# Patient Record
Sex: MALE | Race: WHITE | Employment: OTHER | URBAN - METROPOLITAN AREA
[De-identification: names, ages, dates, MRNs, and addresses within clinical notes are randomized per-mention and may not be internally consistent; named-entity substitution may affect disease eponyms.]

---

## 2023-08-15 ENCOUNTER — HOSPITAL ENCOUNTER (OUTPATIENT)
Dept: WOUND CARE | Age: 63
Discharge: HOME | End: 2023-08-15
Payer: OTHER GOVERNMENT

## 2023-08-15 DIAGNOSIS — E11.621 DIABETIC FOOT ULCER WITH OSTEOMYELITIS (HCC): ICD-10-CM

## 2023-08-15 DIAGNOSIS — M86.9 DIABETIC FOOT ULCER WITH OSTEOMYELITIS (HCC): ICD-10-CM

## 2023-08-15 DIAGNOSIS — E11.69 DIABETIC FOOT ULCER WITH OSTEOMYELITIS (HCC): ICD-10-CM

## 2023-08-15 DIAGNOSIS — L97.509 DIABETIC FOOT ULCER WITH OSTEOMYELITIS (HCC): ICD-10-CM

## 2023-08-15 PROCEDURE — 99203 OFFICE O/P NEW LOW 30 MIN: CPT | Performed by: NURSE PRACTITIONER

## 2023-08-15 NOTE — PROGRESS NOTES
magna and Penicillium (mold). Currently on IV Ceftaroline 600 mg twice daily, PO Metrodinazole, IV micafungin.     Diagnostic Impression:      Diabetic foot ulcer Morejon's stage 3  Non-healing wound of the  right foot   Osteomyelitis of the right foot  Peripheral vascular disease  Diabetes      ROBIN Sharma - CNP MD    8/15/2023

## 2023-08-24 ENCOUNTER — HOSPITAL ENCOUNTER (OUTPATIENT)
Dept: HYPERBARIC MEDICINE | Age: 63
Discharge: HOME | End: 2023-08-24

## 2023-08-29 ENCOUNTER — HOSPITAL ENCOUNTER (OUTPATIENT)
Dept: HYPERBARIC MEDICINE | Age: 63
Discharge: HOME | End: 2023-08-29

## 2023-08-30 ENCOUNTER — HOSPITAL ENCOUNTER (OUTPATIENT)
Dept: HYPERBARIC MEDICINE | Age: 63
Discharge: HOME | End: 2023-08-30
Payer: OTHER GOVERNMENT

## 2023-08-30 VITALS
RESPIRATION RATE: 16 BRPM | HEART RATE: 80 BPM | SYSTOLIC BLOOD PRESSURE: 144 MMHG | TEMPERATURE: 97.3 F | DIASTOLIC BLOOD PRESSURE: 77 MMHG

## 2023-08-30 DIAGNOSIS — M86.9 DIABETIC FOOT ULCER WITH OSTEOMYELITIS (HCC): Primary | ICD-10-CM

## 2023-08-30 DIAGNOSIS — E11.69 DIABETIC FOOT ULCER WITH OSTEOMYELITIS (HCC): Primary | ICD-10-CM

## 2023-08-30 DIAGNOSIS — L97.509 DIABETIC FOOT ULCER WITH OSTEOMYELITIS (HCC): Primary | ICD-10-CM

## 2023-08-30 DIAGNOSIS — E11.621 DIABETIC FOOT ULCER WITH OSTEOMYELITIS (HCC): Primary | ICD-10-CM

## 2023-08-30 LAB
GLUCOSE BLD-MCNC: 242 MG/DL (ref 70–99)
GLUCOSE BLD-MCNC: 262 MG/DL (ref 70–99)

## 2023-08-30 PROCEDURE — 82962 GLUCOSE BLOOD TEST: CPT

## 2023-08-30 PROCEDURE — G0277 HBOT, FULL BODY CHAMBER, 30M: HCPCS

## 2023-08-30 RX ORDER — ACETAMINOPHEN 500 MG
500 TABLET ORAL EVERY 6 HOURS PRN
COMMUNITY

## 2023-08-30 RX ORDER — ENOXAPARIN SODIUM 300 MG/3ML
INJECTION INTRAVENOUS; SUBCUTANEOUS 2 TIMES DAILY
COMMUNITY
End: 2023-08-30

## 2023-08-30 RX ORDER — ASPIRIN 81 MG/1
81 TABLET, CHEWABLE ORAL DAILY
COMMUNITY

## 2023-08-30 RX ORDER — LEVOTHYROXINE SODIUM 88 UG/1
88 TABLET ORAL DAILY
COMMUNITY

## 2023-08-30 RX ORDER — ENOXAPARIN SODIUM 100 MG/ML
INJECTION SUBCUTANEOUS DAILY
COMMUNITY

## 2023-08-30 RX ORDER — LOSARTAN POTASSIUM 25 MG/1
25 TABLET ORAL DAILY
COMMUNITY

## 2023-08-30 RX ORDER — ATORVASTATIN CALCIUM 80 MG/1
80 TABLET, FILM COATED ORAL DAILY
COMMUNITY

## 2023-08-30 RX ORDER — METRONIDAZOLE 500 MG/1
500 TABLET ORAL 2 TIMES DAILY
COMMUNITY

## 2023-08-30 RX ORDER — CLOPIDOGREL BISULFATE 75 MG/1
75 TABLET ORAL DAILY
COMMUNITY

## 2023-08-31 ENCOUNTER — HOSPITAL ENCOUNTER (OUTPATIENT)
Dept: HYPERBARIC MEDICINE | Age: 63
Discharge: HOME | End: 2023-08-31
Payer: OTHER GOVERNMENT

## 2023-08-31 VITALS
HEART RATE: 69 BPM | DIASTOLIC BLOOD PRESSURE: 80 MMHG | SYSTOLIC BLOOD PRESSURE: 176 MMHG | RESPIRATION RATE: 16 BRPM | TEMPERATURE: 97 F

## 2023-08-31 DIAGNOSIS — M86.9 DIABETIC FOOT ULCER WITH OSTEOMYELITIS (HCC): Primary | ICD-10-CM

## 2023-08-31 DIAGNOSIS — L97.509 DIABETIC FOOT ULCER WITH OSTEOMYELITIS (HCC): Primary | ICD-10-CM

## 2023-08-31 DIAGNOSIS — E11.621 DIABETIC FOOT ULCER WITH OSTEOMYELITIS (HCC): Primary | ICD-10-CM

## 2023-08-31 DIAGNOSIS — E11.69 DIABETIC FOOT ULCER WITH OSTEOMYELITIS (HCC): Primary | ICD-10-CM

## 2023-08-31 LAB
GLUCOSE BLD-MCNC: 183 MG/DL (ref 70–99)
GLUCOSE BLD-MCNC: 190 MG/DL (ref 70–99)

## 2023-08-31 PROCEDURE — 82962 GLUCOSE BLOOD TEST: CPT

## 2023-08-31 PROCEDURE — G0277 HBOT, FULL BODY CHAMBER, 30M: HCPCS

## 2023-08-31 RX ORDER — ZOLPIDEM TARTRATE 5 MG/1
5 TABLET ORAL NIGHTLY PRN
COMMUNITY

## 2023-08-31 RX ORDER — CHOLECALCIFEROL (VITAMIN D3) 125 MCG
500 CAPSULE ORAL DAILY
COMMUNITY

## 2023-09-01 ENCOUNTER — HOSPITAL ENCOUNTER (OUTPATIENT)
Dept: HYPERBARIC MEDICINE | Age: 63
Discharge: HOME | End: 2023-09-01
Payer: OTHER GOVERNMENT

## 2023-09-01 VITALS
TEMPERATURE: 97.3 F | RESPIRATION RATE: 16 BRPM | HEART RATE: 74 BPM | SYSTOLIC BLOOD PRESSURE: 175 MMHG | DIASTOLIC BLOOD PRESSURE: 94 MMHG

## 2023-09-01 DIAGNOSIS — E11.69 DIABETIC FOOT ULCER WITH OSTEOMYELITIS (HCC): Primary | ICD-10-CM

## 2023-09-01 DIAGNOSIS — M86.9 DIABETIC FOOT ULCER WITH OSTEOMYELITIS (HCC): Primary | ICD-10-CM

## 2023-09-01 DIAGNOSIS — L97.509 DIABETIC FOOT ULCER WITH OSTEOMYELITIS (HCC): Primary | ICD-10-CM

## 2023-09-01 DIAGNOSIS — E11.621 DIABETIC FOOT ULCER WITH OSTEOMYELITIS (HCC): Primary | ICD-10-CM

## 2023-09-01 LAB
GLUCOSE BLD-MCNC: 147 MG/DL (ref 70–99)
GLUCOSE BLD-MCNC: 163 MG/DL (ref 70–99)

## 2023-09-01 PROCEDURE — G0277 HBOT, FULL BODY CHAMBER, 30M: HCPCS

## 2023-09-01 PROCEDURE — 82962 GLUCOSE BLOOD TEST: CPT

## 2023-09-05 ENCOUNTER — HOSPITAL ENCOUNTER (OUTPATIENT)
Dept: HYPERBARIC MEDICINE | Age: 63
Discharge: HOME | End: 2023-09-05
Payer: OTHER GOVERNMENT

## 2023-09-05 VITALS
SYSTOLIC BLOOD PRESSURE: 157 MMHG | TEMPERATURE: 97.1 F | DIASTOLIC BLOOD PRESSURE: 72 MMHG | RESPIRATION RATE: 16 BRPM | HEART RATE: 73 BPM

## 2023-09-05 DIAGNOSIS — M86.9 DIABETIC FOOT ULCER WITH OSTEOMYELITIS (HCC): Primary | ICD-10-CM

## 2023-09-05 DIAGNOSIS — L97.509 DIABETIC FOOT ULCER WITH OSTEOMYELITIS (HCC): Primary | ICD-10-CM

## 2023-09-05 DIAGNOSIS — E11.69 DIABETIC FOOT ULCER WITH OSTEOMYELITIS (HCC): Primary | ICD-10-CM

## 2023-09-05 DIAGNOSIS — E11.621 DIABETIC FOOT ULCER WITH OSTEOMYELITIS (HCC): Primary | ICD-10-CM

## 2023-09-05 LAB
GLUCOSE BLD-MCNC: 141 MG/DL (ref 70–99)
GLUCOSE BLD-MCNC: 177 MG/DL (ref 70–99)

## 2023-09-05 PROCEDURE — G0277 HBOT, FULL BODY CHAMBER, 30M: HCPCS

## 2023-09-05 PROCEDURE — 82962 GLUCOSE BLOOD TEST: CPT

## 2023-09-05 PROCEDURE — 99183 HYPERBARIC OXYGEN THERAPY: CPT | Performed by: NURSE PRACTITIONER

## 2023-09-06 ENCOUNTER — HOSPITAL ENCOUNTER (OUTPATIENT)
Dept: HYPERBARIC MEDICINE | Age: 63
Discharge: HOME | End: 2023-09-06
Payer: OTHER GOVERNMENT

## 2023-09-06 VITALS
SYSTOLIC BLOOD PRESSURE: 152 MMHG | RESPIRATION RATE: 16 BRPM | TEMPERATURE: 96.9 F | DIASTOLIC BLOOD PRESSURE: 74 MMHG | HEART RATE: 78 BPM

## 2023-09-06 DIAGNOSIS — E11.621 DIABETIC FOOT ULCER WITH OSTEOMYELITIS (HCC): Primary | ICD-10-CM

## 2023-09-06 DIAGNOSIS — L97.509 DIABETIC FOOT ULCER WITH OSTEOMYELITIS (HCC): Primary | ICD-10-CM

## 2023-09-06 DIAGNOSIS — E11.69 DIABETIC FOOT ULCER WITH OSTEOMYELITIS (HCC): Primary | ICD-10-CM

## 2023-09-06 DIAGNOSIS — M86.9 DIABETIC FOOT ULCER WITH OSTEOMYELITIS (HCC): Primary | ICD-10-CM

## 2023-09-06 LAB
GLUCOSE BLD-MCNC: 138 MG/DL (ref 70–99)
GLUCOSE BLD-MCNC: 95 MG/DL (ref 70–99)

## 2023-09-06 PROCEDURE — G0277 HBOT, FULL BODY CHAMBER, 30M: HCPCS

## 2023-09-06 PROCEDURE — 82962 GLUCOSE BLOOD TEST: CPT

## 2023-09-06 RX ORDER — INSULIN GLARGINE 100 [IU]/ML
30 INJECTION, SOLUTION SUBCUTANEOUS NIGHTLY
COMMUNITY

## 2023-09-06 NOTE — PROGRESS NOTES
1 SN voiced preparing to do repeat BS ,after intervention , prior to starting HBO treatment  , Patient stated \" I'm fine , I don't need it repeated , I'm not shaking anymore and I'll be fine \". Repeat BS not done at this time per Pt.  Request.

## 2023-09-07 ENCOUNTER — HOSPITAL ENCOUNTER (OUTPATIENT)
Dept: WOUND CARE | Age: 63
Discharge: HOME | End: 2023-09-07
Payer: OTHER GOVERNMENT

## 2023-09-07 ENCOUNTER — HOSPITAL ENCOUNTER (OUTPATIENT)
Dept: HYPERBARIC MEDICINE | Age: 63
Discharge: HOME | End: 2023-09-07
Payer: OTHER GOVERNMENT

## 2023-09-07 VITALS
DIASTOLIC BLOOD PRESSURE: 56 MMHG | TEMPERATURE: 96.6 F | HEIGHT: 72 IN | SYSTOLIC BLOOD PRESSURE: 124 MMHG | HEART RATE: 85 BPM | WEIGHT: 215 LBS | RESPIRATION RATE: 16 BRPM | BODY MASS INDEX: 29.12 KG/M2

## 2023-09-07 VITALS
SYSTOLIC BLOOD PRESSURE: 156 MMHG | DIASTOLIC BLOOD PRESSURE: 76 MMHG | TEMPERATURE: 97.1 F | RESPIRATION RATE: 16 BRPM | HEART RATE: 86 BPM

## 2023-09-07 DIAGNOSIS — E11.69 DIABETIC FOOT ULCER WITH OSTEOMYELITIS (HCC): Primary | ICD-10-CM

## 2023-09-07 DIAGNOSIS — Z79.4 DIABETES MELLITUS TREATED WITH INSULIN (HCC): ICD-10-CM

## 2023-09-07 DIAGNOSIS — E11.621 DIABETIC FOOT ULCER WITH OSTEOMYELITIS (HCC): Primary | ICD-10-CM

## 2023-09-07 DIAGNOSIS — L97.509 DIABETIC FOOT ULCER WITH OSTEOMYELITIS (HCC): Primary | ICD-10-CM

## 2023-09-07 DIAGNOSIS — E11.9 DIABETES MELLITUS TREATED WITH INSULIN (HCC): ICD-10-CM

## 2023-09-07 DIAGNOSIS — I73.9 PAD (PERIPHERAL ARTERY DISEASE) (HCC): ICD-10-CM

## 2023-09-07 DIAGNOSIS — E11.621 DIABETIC ULCER OF RIGHT MIDFOOT ASSOCIATED WITH TYPE 2 DIABETES MELLITUS, WITH NECROSIS OF MUSCLE (HCC): ICD-10-CM

## 2023-09-07 DIAGNOSIS — M86.9 DIABETIC FOOT ULCER WITH OSTEOMYELITIS (HCC): Primary | ICD-10-CM

## 2023-09-07 DIAGNOSIS — L97.413 DIABETIC ULCER OF RIGHT MIDFOOT ASSOCIATED WITH TYPE 2 DIABETES MELLITUS, WITH NECROSIS OF MUSCLE (HCC): ICD-10-CM

## 2023-09-07 LAB
GLUCOSE BLD-MCNC: 128 MG/DL (ref 70–99)
GLUCOSE BLD-MCNC: 140 MG/DL (ref 70–99)

## 2023-09-07 PROCEDURE — 99213 OFFICE O/P EST LOW 20 MIN: CPT

## 2023-09-07 PROCEDURE — 11043 DBRDMT MUSC&/FSCA 1ST 20/<: CPT | Performed by: NURSE PRACTITIONER

## 2023-09-07 PROCEDURE — G0277 HBOT, FULL BODY CHAMBER, 30M: HCPCS

## 2023-09-07 PROCEDURE — 11046 DBRDMT MUSC&/FSCA EA ADDL: CPT

## 2023-09-07 PROCEDURE — 82962 GLUCOSE BLOOD TEST: CPT

## 2023-09-07 PROCEDURE — 11043 DBRDMT MUSC&/FSCA 1ST 20/<: CPT

## 2023-09-07 PROCEDURE — 11046 DBRDMT MUSC&/FSCA EA ADDL: CPT | Performed by: NURSE PRACTITIONER

## 2023-09-07 RX ORDER — GENTAMICIN SULFATE 1 MG/G
OINTMENT TOPICAL ONCE
OUTPATIENT
Start: 2023-09-07 | End: 2023-09-07

## 2023-09-07 RX ORDER — SODIUM CHLOR/HYPOCHLOROUS ACID 0.033 %
SOLUTION, IRRIGATION IRRIGATION ONCE
OUTPATIENT
Start: 2023-09-07 | End: 2023-09-07

## 2023-09-07 RX ORDER — BACITRACIN ZINC AND POLYMYXIN B SULFATE 500; 1000 [USP'U]/G; [USP'U]/G
OINTMENT TOPICAL ONCE
OUTPATIENT
Start: 2023-09-07 | End: 2023-09-07

## 2023-09-07 RX ORDER — LIDOCAINE HYDROCHLORIDE 40 MG/ML
SOLUTION TOPICAL ONCE
OUTPATIENT
Start: 2023-09-07 | End: 2023-09-07

## 2023-09-07 RX ORDER — LIDOCAINE 40 MG/G
CREAM TOPICAL ONCE
OUTPATIENT
Start: 2023-09-07 | End: 2023-09-07

## 2023-09-07 RX ORDER — GINSENG 100 MG
CAPSULE ORAL ONCE
OUTPATIENT
Start: 2023-09-07 | End: 2023-09-07

## 2023-09-07 RX ORDER — LIDOCAINE 50 MG/G
OINTMENT TOPICAL ONCE
OUTPATIENT
Start: 2023-09-07 | End: 2023-09-07

## 2023-09-07 RX ORDER — BETAMETHASONE DIPROPIONATE 0.05 %
OINTMENT (GRAM) TOPICAL ONCE
OUTPATIENT
Start: 2023-09-07 | End: 2023-09-07

## 2023-09-07 RX ORDER — CLOBETASOL PROPIONATE 0.5 MG/G
OINTMENT TOPICAL ONCE
OUTPATIENT
Start: 2023-09-07 | End: 2023-09-07

## 2023-09-07 RX ORDER — IBUPROFEN 200 MG
TABLET ORAL ONCE
OUTPATIENT
Start: 2023-09-07 | End: 2023-09-07

## 2023-09-07 NOTE — PROGRESS NOTES
[facility] Initial Visit      Madelaine Eric  AGE: 61 y.o. GENDER: male  : 1960  EPISODE DATE:  2023   Referred by: Elysia Tiwari     Subjective:     CHIEF COMPLAINT  WOUND   Problem List Items Addressed This Visit          Circulatory    WD-PAD (peripheral artery disease) ([address])       Endocrine    HBO-Diabetic foot ulcer with osteomyelitis ([address]) - Primary    WD-Diabetic ulcer of right midfoot associated with type 2 diabetes mellitus, with necrosis of muscle ([address])    WD-Diabetes mellitus treated with insulin [facility])     Chief Complaint   Patient presents with    Wound Check        HISTORY of PRESENT ILLNESS      Madelaine Eric is a 61 y.o. male who presents to the [address] [place] [address] [address] for an initial visit for evaluation and treatment of Chronic diabetic ulcer(s) of  right foot. The condition is of marked severity. The ulcer has been present for several months at initial visit to the wound clinic. The underlying cause is thought to be diabetes complicated PAD and infection with osteomyelitis. The patients care to date has included care at the [place], currently inpatient receiving IV antibiotics. The patient has significant underlying medical conditions as below. Elysia Tiwari is PCP. The patient is a left BKA. Wound Pain Timing/Severity: intermittent, mild  Quality of pain: dull, aching, tender  Severity of pain:  3 / 10   Modifying Factors: diabetes, poor glucose control, chronic pressure, shear force, arterial insufficiency, and anticoagulation therapy  Associated Signs/Symptoms: edema, erythema, drainage, and pain    LACY: as indicated base on wound location and assessment      Wound infection: wound culture will be obtained as needed for symptoms of infection         Arterial evaluation: if indicated based on wound, location, symptoms and healing    Venous Evaluation: if indicated based on wound, location, symptoms and healing    Radiology:         No LMP for male patient. Diabetes:  On an insulin

## 2023-09-07 NOTE — PROGRESS NOTES
Nonselective enzymatic debridement performed with Santyl per physician order to wound(s) of the right plantar foot. Patient tolerated the procedure well.      Electronically signed by Sanjay Falcon RN on 9/7/2023 at 9:15 AM

## 2023-09-07 NOTE — DISCHARGE INSTRUCTIONS
PHYSICIAN ORDERS AND DISCHARGE INSTRUCTIONS    NOTE: Upon discharge from the  Medical [facility], you will receive a patient experience survey. We would be grateful if you would take the time to fill this survey out. Wound care order history:     LACY's   Right       Left    Date:   Cultures:     Grafts:     Antibiotics:        Continuing wound care orders and information:                Residence:                Continue home health care with:    Your wound-care supplies will be provided by: Wound cleansing:     Do not scrub or use excessive force. Wash hands with soap and water before and after dressing changes. Prior to applying a clean dressing, cleanse wound with normal saline, wound cleanser, or mild soap and water. Ask the physician or nurse before getting the wound(s) wet in a shower. General Wound management:   Keep weight off wounds and reposition every 2 hours. Avoid standing for long periods of time. Apply wraps/stockings in AM and remove at bedtime. If swelling is present, elevate legs to the level of the heart or above for 30 minutes 4-5 times a day and/or when sitting. When taking antibiotics take entire prescription as ordered by physician do not stop taking until medicine is all gone. Increase protein intake to promote healing. Orders for this week: 9/7/23    [facility] COMPLET[facility]: Fax # [phone]    Rx:    Right Plantar Foot Wound - Wash with soap and water, pat dry. Apply Santyl and Stimulen to wound bed. Cover with Sorbact and two ABDs bolstered laterally with tape across bottom of foot (from medial to lateral). Wrap with conform and ACE up to ankle. Change daily. Dispense 30 day quantity when ordering supplies. Follow up with Kristen Nixon CNP in 1 week(s) in the wound care center. Call [phone] for any questions or concerns.   Date__________   Time____________

## 2023-09-08 ENCOUNTER — HOSPITAL ENCOUNTER (OUTPATIENT)
Dept: WOUND CARE | Age: 63
Discharge: HOME | End: 2023-09-08
Payer: OTHER GOVERNMENT

## 2023-09-08 ENCOUNTER — HOSPITAL ENCOUNTER (OUTPATIENT)
Dept: HYPERBARIC MEDICINE | Age: 63
Discharge: HOME | End: 2023-09-08
Payer: OTHER GOVERNMENT

## 2023-09-08 VITALS
RESPIRATION RATE: 16 BRPM | TEMPERATURE: 97.2 F | SYSTOLIC BLOOD PRESSURE: 138 MMHG | DIASTOLIC BLOOD PRESSURE: 77 MMHG | HEART RATE: 77 BPM

## 2023-09-08 DIAGNOSIS — M86.9 DIABETIC FOOT ULCER WITH OSTEOMYELITIS (HCC): Primary | ICD-10-CM

## 2023-09-08 DIAGNOSIS — E11.69 DIABETIC FOOT ULCER WITH OSTEOMYELITIS (HCC): Primary | ICD-10-CM

## 2023-09-08 DIAGNOSIS — L97.509 DIABETIC FOOT ULCER WITH OSTEOMYELITIS (HCC): Primary | ICD-10-CM

## 2023-09-08 DIAGNOSIS — E11.621 DIABETIC FOOT ULCER WITH OSTEOMYELITIS (HCC): Primary | ICD-10-CM

## 2023-09-08 LAB
GLUCOSE BLD-MCNC: 119 MG/DL (ref 70–99)
GLUCOSE BLD-MCNC: 193 MG/DL (ref 70–99)

## 2023-09-08 PROCEDURE — G0277 HBOT, FULL BODY CHAMBER, 30M: HCPCS

## 2023-09-08 PROCEDURE — 97602 WOUND(S) CARE NON-SELECTIVE: CPT

## 2023-09-08 PROCEDURE — 82962 GLUCOSE BLOOD TEST: CPT

## 2023-09-08 NOTE — PROGRESS NOTES
Nonselective enzymatic debridement performed with Santyl per physician order to wound(s) of the right foot  Patient tolerated the procedure well.

## 2023-09-11 ENCOUNTER — HOSPITAL ENCOUNTER (OUTPATIENT)
Dept: WOUND CARE | Age: 63
Discharge: HOME | End: 2023-09-11
Payer: OTHER GOVERNMENT

## 2023-09-11 ENCOUNTER — HOSPITAL ENCOUNTER (OUTPATIENT)
Dept: HYPERBARIC MEDICINE | Age: 63
Discharge: HOME | End: 2023-09-11
Payer: OTHER GOVERNMENT

## 2023-09-11 VITALS
TEMPERATURE: 97.1 F | SYSTOLIC BLOOD PRESSURE: 162 MMHG | RESPIRATION RATE: 16 BRPM | DIASTOLIC BLOOD PRESSURE: 79 MMHG | HEART RATE: 77 BPM

## 2023-09-11 DIAGNOSIS — L97.509 DIABETIC FOOT ULCER WITH OSTEOMYELITIS (HCC): Primary | ICD-10-CM

## 2023-09-11 DIAGNOSIS — M86.9 DIABETIC FOOT ULCER WITH OSTEOMYELITIS (HCC): Primary | ICD-10-CM

## 2023-09-11 DIAGNOSIS — E11.69 DIABETIC FOOT ULCER WITH OSTEOMYELITIS (HCC): Primary | ICD-10-CM

## 2023-09-11 DIAGNOSIS — E11.621 DIABETIC FOOT ULCER WITH OSTEOMYELITIS (HCC): Primary | ICD-10-CM

## 2023-09-11 LAB
GLUCOSE BLD-MCNC: 123 MG/DL (ref 70–99)
GLUCOSE BLD-MCNC: 171 MG/DL (ref 70–99)

## 2023-09-11 PROCEDURE — 82962 GLUCOSE BLOOD TEST: CPT

## 2023-09-11 PROCEDURE — G0277 HBOT, FULL BODY CHAMBER, 30M: HCPCS

## 2023-09-11 PROCEDURE — 99213 OFFICE O/P EST LOW 20 MIN: CPT

## 2023-09-12 ENCOUNTER — HOSPITAL ENCOUNTER (OUTPATIENT)
Dept: WOUND CARE | Age: 63
Discharge: HOME | End: 2023-09-12
Payer: OTHER GOVERNMENT

## 2023-09-12 ENCOUNTER — HOSPITAL ENCOUNTER (OUTPATIENT)
Dept: HYPERBARIC MEDICINE | Age: 63
Discharge: HOME | End: 2023-09-12
Payer: OTHER GOVERNMENT

## 2023-09-12 VITALS
SYSTOLIC BLOOD PRESSURE: 138 MMHG | RESPIRATION RATE: 16 BRPM | TEMPERATURE: 97.3 F | DIASTOLIC BLOOD PRESSURE: 75 MMHG | HEART RATE: 71 BPM

## 2023-09-12 VITALS
SYSTOLIC BLOOD PRESSURE: 117 MMHG | RESPIRATION RATE: 16 BRPM | DIASTOLIC BLOOD PRESSURE: 63 MMHG | HEART RATE: 77 BPM | TEMPERATURE: 97.6 F

## 2023-09-12 DIAGNOSIS — L97.509 DIABETIC FOOT ULCER WITH OSTEOMYELITIS (HCC): Primary | ICD-10-CM

## 2023-09-12 DIAGNOSIS — M86.9 DIABETIC FOOT ULCER WITH OSTEOMYELITIS (HCC): Primary | ICD-10-CM

## 2023-09-12 DIAGNOSIS — E11.621 DIABETIC FOOT ULCER WITH OSTEOMYELITIS (HCC): Primary | ICD-10-CM

## 2023-09-12 DIAGNOSIS — E11.69 DIABETIC FOOT ULCER WITH OSTEOMYELITIS (HCC): Primary | ICD-10-CM

## 2023-09-12 LAB
GLUCOSE BLD-MCNC: 147 MG/DL (ref 70–99)
GLUCOSE BLD-MCNC: 176 MG/DL (ref 70–99)

## 2023-09-12 PROCEDURE — 82962 GLUCOSE BLOOD TEST: CPT

## 2023-09-12 PROCEDURE — G0277 HBOT, FULL BODY CHAMBER, 30M: HCPCS

## 2023-09-12 PROCEDURE — 97602 WOUND(S) CARE NON-SELECTIVE: CPT

## 2023-09-13 ENCOUNTER — HOSPITAL ENCOUNTER (OUTPATIENT)
Dept: WOUND CARE | Age: 63
Discharge: HOME | End: 2023-09-13
Payer: OTHER GOVERNMENT

## 2023-09-13 ENCOUNTER — HOSPITAL ENCOUNTER (OUTPATIENT)
Dept: HYPERBARIC MEDICINE | Age: 63
Discharge: HOME | End: 2023-09-13
Payer: OTHER GOVERNMENT

## 2023-09-13 VITALS
DIASTOLIC BLOOD PRESSURE: 85 MMHG | TEMPERATURE: 97.1 F | HEART RATE: 82 BPM | RESPIRATION RATE: 16 BRPM | SYSTOLIC BLOOD PRESSURE: 178 MMHG

## 2023-09-13 DIAGNOSIS — M86.9 DIABETIC FOOT ULCER WITH OSTEOMYELITIS (HCC): Primary | ICD-10-CM

## 2023-09-13 DIAGNOSIS — L97.413 DIABETIC ULCER OF RIGHT MIDFOOT ASSOCIATED WITH TYPE 2 DIABETES MELLITUS, WITH NECROSIS OF MUSCLE (HCC): ICD-10-CM

## 2023-09-13 DIAGNOSIS — I73.9 PAD (PERIPHERAL ARTERY DISEASE) (HCC): Primary | ICD-10-CM

## 2023-09-13 DIAGNOSIS — L97.509 DIABETIC FOOT ULCER WITH OSTEOMYELITIS (HCC): Primary | ICD-10-CM

## 2023-09-13 DIAGNOSIS — Z79.4 DIABETES MELLITUS TREATED WITH INSULIN (HCC): ICD-10-CM

## 2023-09-13 DIAGNOSIS — E11.621 DIABETIC ULCER OF RIGHT MIDFOOT ASSOCIATED WITH TYPE 2 DIABETES MELLITUS, WITH NECROSIS OF MUSCLE (HCC): ICD-10-CM

## 2023-09-13 DIAGNOSIS — E11.621 DIABETIC FOOT ULCER WITH OSTEOMYELITIS (HCC): ICD-10-CM

## 2023-09-13 DIAGNOSIS — M86.9 DIABETIC FOOT ULCER WITH OSTEOMYELITIS (HCC): ICD-10-CM

## 2023-09-13 DIAGNOSIS — E11.621 DIABETIC FOOT ULCER WITH OSTEOMYELITIS (HCC): Primary | ICD-10-CM

## 2023-09-13 DIAGNOSIS — L97.509 DIABETIC FOOT ULCER WITH OSTEOMYELITIS (HCC): ICD-10-CM

## 2023-09-13 DIAGNOSIS — E11.69 DIABETIC FOOT ULCER WITH OSTEOMYELITIS (HCC): Primary | ICD-10-CM

## 2023-09-13 DIAGNOSIS — E11.9 DIABETES MELLITUS TREATED WITH INSULIN (HCC): ICD-10-CM

## 2023-09-13 DIAGNOSIS — E11.69 DIABETIC FOOT ULCER WITH OSTEOMYELITIS (HCC): ICD-10-CM

## 2023-09-13 LAB
GLUCOSE BLD-MCNC: 154 MG/DL (ref 70–99)
GLUCOSE BLD-MCNC: 233 MG/DL (ref 70–99)

## 2023-09-13 PROCEDURE — 99183 HYPERBARIC OXYGEN THERAPY: CPT | Performed by: NURSE PRACTITIONER

## 2023-09-13 PROCEDURE — 97602 WOUND(S) CARE NON-SELECTIVE: CPT

## 2023-09-13 PROCEDURE — 82962 GLUCOSE BLOOD TEST: CPT

## 2023-09-13 PROCEDURE — G0277 HBOT, FULL BODY CHAMBER, 30M: HCPCS

## 2023-09-13 NOTE — PROGRESS NOTES
Nonselective enzymatic debridement performed with Santyl per physician order to wound(s) of the Right Plantar Foot. Patient tolerated the procedure well.      Electronically signed by Marla Smith RN on 9/13/2023 at 11:25 AM

## 2023-09-14 ENCOUNTER — HOSPITAL ENCOUNTER (OUTPATIENT)
Dept: HYPERBARIC MEDICINE | Age: 63
Discharge: HOME | End: 2023-09-14
Payer: OTHER GOVERNMENT

## 2023-09-14 ENCOUNTER — HOSPITAL ENCOUNTER (OUTPATIENT)
Dept: WOUND CARE | Age: 63
Discharge: HOME | End: 2023-09-14
Payer: OTHER GOVERNMENT

## 2023-09-14 VITALS
RESPIRATION RATE: 12 BRPM | HEART RATE: 81 BPM | DIASTOLIC BLOOD PRESSURE: 79 MMHG | TEMPERATURE: 97 F | SYSTOLIC BLOOD PRESSURE: 158 MMHG

## 2023-09-14 DIAGNOSIS — E11.621 DIABETIC ULCER OF RIGHT MIDFOOT ASSOCIATED WITH TYPE 2 DIABETES MELLITUS, WITH NECROSIS OF MUSCLE (HCC): Primary | ICD-10-CM

## 2023-09-14 DIAGNOSIS — L97.413 DIABETIC ULCER OF RIGHT MIDFOOT ASSOCIATED WITH TYPE 2 DIABETES MELLITUS, WITH NECROSIS OF MUSCLE (HCC): Primary | ICD-10-CM

## 2023-09-14 DIAGNOSIS — E11.621 DIABETIC FOOT ULCER WITH OSTEOMYELITIS (HCC): ICD-10-CM

## 2023-09-14 DIAGNOSIS — M86.9 DIABETIC FOOT ULCER WITH OSTEOMYELITIS (HCC): Primary | ICD-10-CM

## 2023-09-14 DIAGNOSIS — L97.509 DIABETIC FOOT ULCER WITH OSTEOMYELITIS (HCC): Primary | ICD-10-CM

## 2023-09-14 DIAGNOSIS — M86.9 DIABETIC FOOT ULCER WITH OSTEOMYELITIS (HCC): ICD-10-CM

## 2023-09-14 DIAGNOSIS — L97.509 DIABETIC FOOT ULCER WITH OSTEOMYELITIS (HCC): ICD-10-CM

## 2023-09-14 DIAGNOSIS — Z79.4 DIABETES MELLITUS TREATED WITH INSULIN (HCC): ICD-10-CM

## 2023-09-14 DIAGNOSIS — E11.9 DIABETES MELLITUS TREATED WITH INSULIN (HCC): ICD-10-CM

## 2023-09-14 DIAGNOSIS — I73.9 PAD (PERIPHERAL ARTERY DISEASE) (HCC): ICD-10-CM

## 2023-09-14 DIAGNOSIS — E11.69 DIABETIC FOOT ULCER WITH OSTEOMYELITIS (HCC): Primary | ICD-10-CM

## 2023-09-14 DIAGNOSIS — E11.69 DIABETIC FOOT ULCER WITH OSTEOMYELITIS (HCC): ICD-10-CM

## 2023-09-14 DIAGNOSIS — E11.621 DIABETIC FOOT ULCER WITH OSTEOMYELITIS (HCC): Primary | ICD-10-CM

## 2023-09-14 LAB
GLUCOSE BLD-MCNC: 234 MG/DL (ref 70–99)
GLUCOSE BLD-MCNC: 257 MG/DL (ref 70–99)

## 2023-09-14 PROCEDURE — 11042 DBRDMT SUBQ TIS 1ST 20SQCM/<: CPT

## 2023-09-14 PROCEDURE — 11045 DBRDMT SUBQ TISS EACH ADDL: CPT

## 2023-09-14 PROCEDURE — 11046 DBRDMT MUSC&/FSCA EA ADDL: CPT

## 2023-09-14 PROCEDURE — 82962 GLUCOSE BLOOD TEST: CPT

## 2023-09-14 PROCEDURE — G0277 HBOT, FULL BODY CHAMBER, 30M: HCPCS

## 2023-09-14 PROCEDURE — 11043 DBRDMT MUSC&/FSCA 1ST 20/<: CPT

## 2023-09-14 RX ORDER — IBUPROFEN 200 MG
TABLET ORAL ONCE
OUTPATIENT
Start: 2023-09-14 | End: 2023-09-14

## 2023-09-14 RX ORDER — LIDOCAINE 50 MG/G
OINTMENT TOPICAL ONCE
OUTPATIENT
Start: 2023-09-14 | End: 2023-09-14

## 2023-09-14 RX ORDER — GINSENG 100 MG
CAPSULE ORAL ONCE
OUTPATIENT
Start: 2023-09-14 | End: 2023-09-14

## 2023-09-14 RX ORDER — LIDOCAINE HYDROCHLORIDE 40 MG/ML
SOLUTION TOPICAL ONCE
OUTPATIENT
Start: 2023-09-14 | End: 2023-09-14

## 2023-09-14 RX ORDER — SODIUM CHLOR/HYPOCHLOROUS ACID 0.033 %
SOLUTION, IRRIGATION IRRIGATION ONCE
OUTPATIENT
Start: 2023-09-14 | End: 2023-09-14

## 2023-09-14 RX ORDER — CLOBETASOL PROPIONATE 0.5 MG/G
OINTMENT TOPICAL ONCE
OUTPATIENT
Start: 2023-09-14 | End: 2023-09-14

## 2023-09-14 RX ORDER — GENTAMICIN SULFATE 1 MG/G
OINTMENT TOPICAL ONCE
OUTPATIENT
Start: 2023-09-14 | End: 2023-09-14

## 2023-09-14 RX ORDER — BETAMETHASONE DIPROPIONATE 0.05 %
OINTMENT (GRAM) TOPICAL ONCE
OUTPATIENT
Start: 2023-09-14 | End: 2023-09-14

## 2023-09-14 RX ORDER — LIDOCAINE 40 MG/G
CREAM TOPICAL ONCE
OUTPATIENT
Start: 2023-09-14 | End: 2023-09-14

## 2023-09-14 RX ORDER — BACITRACIN ZINC AND POLYMYXIN B SULFATE 500; 1000 [USP'U]/G; [USP'U]/G
OINTMENT TOPICAL ONCE
OUTPATIENT
Start: 2023-09-14 | End: 2023-09-14

## 2023-09-14 NOTE — PROGRESS NOTES
Nonselective enzymatic debridement performed with Santyl per physician order to wound(s) of the right foot  Patient tolerated the procedure well.
Tunneling Position ___ O'Clock 0 09/14/23 0837   Undermining Starts ___ O'Clock 0 09/14/23 0837   Undermining Ends___ O'Clock 0 09/14/23 0837   Undermining Maxium Distance (cm) 0 09/14/23 0837   Wound Assessment Granulation tissue; Exposed structure fascia 09/14/23 0837   Drainage Amount Moderate (25-50%) 09/14/23 0837   Drainage Description Serosanguinous 09/14/23 0837   Odor None 09/14/23 0837   Brittni-wound Assessment Maceration 09/14/23 0837   Margins Defined edges 09/14/23 0837   Wound Thickness Description not for Pressure Injury Full thickness 09/14/23 0837   Number of days: 7       Total  Area  Debrided:  48 sq cm     Bleeding:  Minimal    Hemostasis Achieved:  by pressure    Procedural Pain:  2  / 10     Post Procedural Pain:  0 / 10     Response to treatment:  Well tolerated by patient. Status of wound progress and description from last visit: Improved in size and appearance. Wound debrided today, regimen discussed and established with patient, will follow up next week. Discussed diabetic management. The patient is now home, we discussed at length the importance of off loading for his wound to progress. The patients records were reviewed and discussed. Time was given for questions . All questions were answered to the patients satisfaction. A total time of 20 minutes was spent with at least 50% related to face to face counseling and coordination of care. Plan:     Discharge instructions:    Discharge Instructions         PHYSICIAN ORDERS AND DISCHARGE INSTRUCTIONS     NOTE: Upon discharge from the  [facility], you will receive a patient experience survey. We would be grateful if you would take the time to fill this survey out.      Wound care order history:                 LACY's   Right       Left               Date:              Cultures:                Grafts:                Antibiotics:           Continuing wound care orders and information:                 Residence:                Continue home

## 2023-09-14 NOTE — DISCHARGE INSTRUCTIONS
filled with layers of sorbact) and two ABDs bolstered laterally with tape across bottom of foot (from medial to lateral). Wrap with conform and ACE up to ankle. Change daily. Dispense 30 day quantity when ordering supplies. Follow up with Manuela Dent CNP in 1 week(s) in the wound care center. Call [phone] for any questions or concerns.   Date__________   Time____________

## 2023-09-15 ENCOUNTER — HOSPITAL ENCOUNTER (OUTPATIENT)
Dept: WOUND CARE | Age: 63
Discharge: HOME | End: 2023-09-15
Payer: OTHER GOVERNMENT

## 2023-09-15 ENCOUNTER — HOSPITAL ENCOUNTER (OUTPATIENT)
Dept: HYPERBARIC MEDICINE | Age: 63
Discharge: HOME | End: 2023-09-15
Payer: OTHER GOVERNMENT

## 2023-09-15 VITALS
DIASTOLIC BLOOD PRESSURE: 94 MMHG | RESPIRATION RATE: 16 BRPM | SYSTOLIC BLOOD PRESSURE: 191 MMHG | HEART RATE: 81 BPM | TEMPERATURE: 97.4 F

## 2023-09-15 DIAGNOSIS — L97.509 DIABETIC FOOT ULCER WITH OSTEOMYELITIS (HCC): Primary | ICD-10-CM

## 2023-09-15 DIAGNOSIS — E11.621 DIABETIC FOOT ULCER WITH OSTEOMYELITIS (HCC): Primary | ICD-10-CM

## 2023-09-15 DIAGNOSIS — E11.69 DIABETIC FOOT ULCER WITH OSTEOMYELITIS (HCC): Primary | ICD-10-CM

## 2023-09-15 DIAGNOSIS — M86.9 DIABETIC FOOT ULCER WITH OSTEOMYELITIS (HCC): Primary | ICD-10-CM

## 2023-09-15 LAB
GLUCOSE BLD-MCNC: 193 MG/DL (ref 70–99)
GLUCOSE BLD-MCNC: 275 MG/DL (ref 70–99)

## 2023-09-15 PROCEDURE — G0277 HBOT, FULL BODY CHAMBER, 30M: HCPCS

## 2023-09-15 PROCEDURE — 99213 OFFICE O/P EST LOW 20 MIN: CPT

## 2023-09-15 PROCEDURE — 97602 WOUND(S) CARE NON-SELECTIVE: CPT

## 2023-09-15 PROCEDURE — 82962 GLUCOSE BLOOD TEST: CPT

## 2023-09-18 ENCOUNTER — HOSPITAL ENCOUNTER (OUTPATIENT)
Dept: WOUND CARE | Age: 63
Discharge: HOME | End: 2023-09-18
Payer: OTHER GOVERNMENT

## 2023-09-18 ENCOUNTER — HOSPITAL ENCOUNTER (OUTPATIENT)
Dept: HYPERBARIC MEDICINE | Age: 63
Discharge: HOME | End: 2023-09-18
Payer: OTHER GOVERNMENT

## 2023-09-18 VITALS
DIASTOLIC BLOOD PRESSURE: 76 MMHG | TEMPERATURE: 97.5 F | RESPIRATION RATE: 16 BRPM | SYSTOLIC BLOOD PRESSURE: 157 MMHG | HEART RATE: 76 BPM

## 2023-09-18 DIAGNOSIS — L97.509 DIABETIC FOOT ULCER WITH OSTEOMYELITIS (HCC): ICD-10-CM

## 2023-09-18 DIAGNOSIS — E11.621 DIABETIC ULCER OF RIGHT MIDFOOT ASSOCIATED WITH TYPE 2 DIABETES MELLITUS, WITH NECROSIS OF MUSCLE (HCC): Primary | ICD-10-CM

## 2023-09-18 DIAGNOSIS — E11.621 DIABETIC FOOT ULCER WITH OSTEOMYELITIS (HCC): Primary | ICD-10-CM

## 2023-09-18 DIAGNOSIS — L97.509 DIABETIC FOOT ULCER WITH OSTEOMYELITIS (HCC): Primary | ICD-10-CM

## 2023-09-18 DIAGNOSIS — E11.69 DIABETIC FOOT ULCER WITH OSTEOMYELITIS (HCC): ICD-10-CM

## 2023-09-18 DIAGNOSIS — E11.69 DIABETIC FOOT ULCER WITH OSTEOMYELITIS (HCC): Primary | ICD-10-CM

## 2023-09-18 DIAGNOSIS — L97.413 DIABETIC ULCER OF RIGHT MIDFOOT ASSOCIATED WITH TYPE 2 DIABETES MELLITUS, WITH NECROSIS OF MUSCLE (HCC): Primary | ICD-10-CM

## 2023-09-18 DIAGNOSIS — E11.621 DIABETIC FOOT ULCER WITH OSTEOMYELITIS (HCC): ICD-10-CM

## 2023-09-18 DIAGNOSIS — I73.9 PAD (PERIPHERAL ARTERY DISEASE) (HCC): ICD-10-CM

## 2023-09-18 DIAGNOSIS — E11.9 DIABETES MELLITUS TREATED WITH INSULIN (HCC): ICD-10-CM

## 2023-09-18 DIAGNOSIS — M86.9 DIABETIC FOOT ULCER WITH OSTEOMYELITIS (HCC): Primary | ICD-10-CM

## 2023-09-18 DIAGNOSIS — Z79.4 DIABETES MELLITUS TREATED WITH INSULIN (HCC): ICD-10-CM

## 2023-09-18 DIAGNOSIS — M86.9 DIABETIC FOOT ULCER WITH OSTEOMYELITIS (HCC): ICD-10-CM

## 2023-09-18 LAB
GLUCOSE BLD-MCNC: 181 MG/DL (ref 70–99)
GLUCOSE BLD-MCNC: 234 MG/DL (ref 70–99)

## 2023-09-18 PROCEDURE — 82962 GLUCOSE BLOOD TEST: CPT

## 2023-09-18 PROCEDURE — 11046 DBRDMT MUSC&/FSCA EA ADDL: CPT

## 2023-09-18 PROCEDURE — G0277 HBOT, FULL BODY CHAMBER, 30M: HCPCS

## 2023-09-18 PROCEDURE — 11043 DBRDMT MUSC&/FSCA 1ST 20/<: CPT

## 2023-09-18 PROCEDURE — 99183 HYPERBARIC OXYGEN THERAPY: CPT | Performed by: NURSE PRACTITIONER

## 2023-09-18 RX ORDER — TRIAMCINOLONE ACETONIDE 1 MG/G
OINTMENT TOPICAL ONCE
OUTPATIENT
Start: 2023-09-18 | End: 2023-09-18

## 2023-09-18 RX ORDER — SODIUM CHLOR/HYPOCHLOROUS ACID 0.033 %
SOLUTION, IRRIGATION IRRIGATION ONCE
OUTPATIENT
Start: 2023-09-18 | End: 2023-09-18

## 2023-09-18 RX ORDER — LIDOCAINE 40 MG/G
CREAM TOPICAL ONCE
OUTPATIENT
Start: 2023-09-18 | End: 2023-09-18

## 2023-09-18 RX ORDER — LIDOCAINE 50 MG/G
OINTMENT TOPICAL ONCE
OUTPATIENT
Start: 2023-09-18 | End: 2023-09-18

## 2023-09-18 RX ORDER — BACITRACIN ZINC AND POLYMYXIN B SULFATE 500; 1000 [USP'U]/G; [USP'U]/G
OINTMENT TOPICAL ONCE
OUTPATIENT
Start: 2023-09-18 | End: 2023-09-18

## 2023-09-18 RX ORDER — BETAMETHASONE DIPROPIONATE 0.05 %
OINTMENT (GRAM) TOPICAL ONCE
OUTPATIENT
Start: 2023-09-18 | End: 2023-09-18

## 2023-09-18 RX ORDER — GINSENG 100 MG
CAPSULE ORAL ONCE
OUTPATIENT
Start: 2023-09-18 | End: 2023-09-18

## 2023-09-18 RX ORDER — GENTAMICIN SULFATE 1 MG/G
OINTMENT TOPICAL ONCE
OUTPATIENT
Start: 2023-09-18 | End: 2023-09-18

## 2023-09-18 RX ORDER — IBUPROFEN 200 MG
TABLET ORAL ONCE
OUTPATIENT
Start: 2023-09-18 | End: 2023-09-18

## 2023-09-18 RX ORDER — CLOBETASOL PROPIONATE 0.5 MG/G
OINTMENT TOPICAL ONCE
OUTPATIENT
Start: 2023-09-18 | End: 2023-09-18

## 2023-09-18 RX ORDER — LIDOCAINE HYDROCHLORIDE 40 MG/ML
SOLUTION TOPICAL ONCE
OUTPATIENT
Start: 2023-09-18 | End: 2023-09-18

## 2023-09-18 NOTE — DISCHARGE INSTRUCTIONS
PHYSICIAN ORDERS AND DISCHARGE INSTRUCTIONS     NOTE: Upon discharge from the  Medical [facility], you will receive a patient experience survey. We would be grateful if you would take the time to fill this survey out. Wound care order history:                 LACY's   Right       Left               Date:              Cultures:                Grafts:                Antibiotics:           Continuing wound care orders and information:                 Residence:                Continue home health care with:               Your wound-care supplies will be provided by: Wound cleansing:               Do not scrub or use excessive force. Wash hands with soap and water before and after dressing changes. Prior to applying a clean dressing, cleanse wound with normal saline, wound cleanser, or mild soap and water. Ask the physician or nurse before getting the wound(s) wet in a shower. General Wound management:              Keep weight off wounds and reposition every 2 hours. Avoid standing for long periods of time. Apply wraps/stockings in AM and remove at bedtime. If swelling is present, elevate legs to the level of the heart or above for 30 minutes 4-5 times a day and/or when sitting. When taking antibiotics take entire prescription as ordered by physician do not stop taking until medicine is all gone. Increase protein intake to promote healing. Orders for this week: 09/14/23      [facility] COMPLET[facility]: Fax # [phone]     Rx:     Right Plantar Foot Wound - Wash with soap and water, pat dry. Apply Santyl and 2 packs of Stimulen to wound bed. Cover with Sorbact (First layer of sorbact- spray with anasept spray, cut to fit wound, cover with stimulen, place horizontally.  Second layer of sorbact- cut to fit wound and cover with santyl and stimulen powder, place vertically- repeat this step until wound is

## 2023-09-18 NOTE — PATIENT INSTRUCTIONS
PHYSICIAN ORDERS AND DISCHARGE INSTRUCTIONS     NOTE: Upon discharge from the  Medical [facility], you will receive a patient experience survey. We would be grateful if you would take the time to fill this survey out. Wound care order history:                 LACY's   Right       Left               Date:              Cultures:                Grafts:                Antibiotics:           Continuing wound care orders and information:                 Residence:                Continue home health care with:               Your wound-care supplies will be provided by: Wound cleansing:               Do not scrub or use excessive force. Wash hands with soap and water before and after dressing changes. Prior to applying a clean dressing, cleanse wound with normal saline, wound cleanser, or mild soap and water. Ask the physician or nurse before getting the wound(s) wet in a shower. General Wound management:              Keep weight off wounds and reposition every 2 hours. Avoid standing for long periods of time. Apply wraps/stockings in AM and remove at bedtime. If swelling is present, elevate legs to the level of the heart or above for 30 minutes 4-5 times a day and/or when sitting. When taking antibiotics take entire prescription as ordered by physician do not stop taking until medicine is all gone. Increase protein intake to promote healing. Orders for this week: 09/14/23      [facility] COMPLET[facility]: Fax # [phone]     Right Plantar Foot Wound - Wash with soap and water, pat dry. Apply Santyl, Stimulen and Sorbact cut and layer to fit and fill wound till flush with periwound. Cover with 2 ABD folded (secured over wound with medipore tape)   Wrap with conform. ( Enclose toes with conform and wrap around ankle) . Secure with tape   Wrap with Ace.    Leave in place until next visit to Wound care

## 2023-09-19 ENCOUNTER — HOSPITAL ENCOUNTER (OUTPATIENT)
Dept: HYPERBARIC MEDICINE | Age: 63
Discharge: HOME | End: 2023-09-19
Payer: OTHER GOVERNMENT

## 2023-09-19 ENCOUNTER — HOSPITAL ENCOUNTER (OUTPATIENT)
Dept: WOUND CARE | Age: 63
Discharge: HOME | End: 2023-09-19
Payer: OTHER GOVERNMENT

## 2023-09-19 VITALS
HEART RATE: 77 BPM | TEMPERATURE: 97.9 F | RESPIRATION RATE: 16 BRPM | SYSTOLIC BLOOD PRESSURE: 163 MMHG | DIASTOLIC BLOOD PRESSURE: 79 MMHG

## 2023-09-19 DIAGNOSIS — E11.621 DIABETIC FOOT ULCER WITH OSTEOMYELITIS (HCC): Primary | ICD-10-CM

## 2023-09-19 DIAGNOSIS — M86.9 DIABETIC FOOT ULCER WITH OSTEOMYELITIS (HCC): Primary | ICD-10-CM

## 2023-09-19 DIAGNOSIS — E11.69 DIABETIC FOOT ULCER WITH OSTEOMYELITIS (HCC): Primary | ICD-10-CM

## 2023-09-19 DIAGNOSIS — L97.509 DIABETIC FOOT ULCER WITH OSTEOMYELITIS (HCC): Primary | ICD-10-CM

## 2023-09-19 LAB
GLUCOSE BLD-MCNC: 196 MG/DL (ref 70–99)
GLUCOSE BLD-MCNC: 282 MG/DL (ref 70–99)

## 2023-09-19 PROCEDURE — G0277 HBOT, FULL BODY CHAMBER, 30M: HCPCS

## 2023-09-19 PROCEDURE — 82962 GLUCOSE BLOOD TEST: CPT

## 2023-09-19 PROCEDURE — 99213 OFFICE O/P EST LOW 20 MIN: CPT

## 2023-09-19 NOTE — PROGRESS NOTES
Patient arrived for N/V and HBO visit. Per Mae Miranda RN  changed outer dressing by Removing old ABD's ,conform, & Ace Wrap; new ABD's , conform , ACE wrap applied . Wound with large amount of serosanguinous drainage and mild odor. Patient tolerated well.

## 2023-09-20 ENCOUNTER — HOSPITAL ENCOUNTER (OUTPATIENT)
Dept: WOUND CARE | Age: 63
Discharge: HOME | End: 2023-09-20
Payer: OTHER GOVERNMENT

## 2023-09-20 ENCOUNTER — HOSPITAL ENCOUNTER (OUTPATIENT)
Dept: HYPERBARIC MEDICINE | Age: 63
Discharge: HOME | End: 2023-09-20
Payer: OTHER GOVERNMENT

## 2023-09-20 VITALS
DIASTOLIC BLOOD PRESSURE: 83 MMHG | HEART RATE: 81 BPM | SYSTOLIC BLOOD PRESSURE: 176 MMHG | TEMPERATURE: 97.5 F | RESPIRATION RATE: 16 BRPM

## 2023-09-20 DIAGNOSIS — E11.621 DIABETIC FOOT ULCER WITH OSTEOMYELITIS (HCC): Primary | ICD-10-CM

## 2023-09-20 DIAGNOSIS — L97.509 DIABETIC FOOT ULCER WITH OSTEOMYELITIS (HCC): Primary | ICD-10-CM

## 2023-09-20 DIAGNOSIS — E11.69 DIABETIC FOOT ULCER WITH OSTEOMYELITIS (HCC): Primary | ICD-10-CM

## 2023-09-20 DIAGNOSIS — M86.9 DIABETIC FOOT ULCER WITH OSTEOMYELITIS (HCC): Primary | ICD-10-CM

## 2023-09-20 LAB
GLUCOSE BLD-MCNC: 235 MG/DL (ref 70–99)
GLUCOSE BLD-MCNC: 269 MG/DL (ref 70–99)

## 2023-09-20 PROCEDURE — G0277 HBOT, FULL BODY CHAMBER, 30M: HCPCS

## 2023-09-20 PROCEDURE — 82962 GLUCOSE BLOOD TEST: CPT

## 2023-09-20 PROCEDURE — 97602 WOUND(S) CARE NON-SELECTIVE: CPT

## 2023-09-20 ASSESSMENT — PAIN DESCRIPTION - PAIN TYPE
TYPE: CHRONIC PAIN
TYPE: CHRONIC PAIN

## 2023-09-20 ASSESSMENT — PAIN DESCRIPTION - DESCRIPTORS
DESCRIPTORS: ACHING
DESCRIPTORS: ACHING

## 2023-09-20 ASSESSMENT — PAIN SCALES - GENERAL
PAINLEVEL_OUTOF10: 5
PAINLEVEL_OUTOF10: 5

## 2023-09-20 ASSESSMENT — PAIN DESCRIPTION - ONSET
ONSET: ON-GOING
ONSET: ON-GOING

## 2023-09-20 ASSESSMENT — PAIN DESCRIPTION - LOCATION
LOCATION: OTHER (COMMENT)
LOCATION: OTHER (COMMENT)

## 2023-09-20 ASSESSMENT — PAIN - FUNCTIONAL ASSESSMENT
PAIN_FUNCTIONAL_ASSESSMENT: PREVENTS OR INTERFERES SOME ACTIVE ACTIVITIES AND ADLS
PAIN_FUNCTIONAL_ASSESSMENT: PREVENTS OR INTERFERES SOME ACTIVE ACTIVITIES AND ADLS

## 2023-09-20 ASSESSMENT — PAIN DESCRIPTION - FREQUENCY
FREQUENCY: INTERMITTENT
FREQUENCY: CONTINUOUS

## 2023-09-20 NOTE — PROGRESS NOTES
Nonselective enzymatic debridement performed with Zoë per physician order to wound(s) of the Right Plantar Foot. Patient tolerated the procedure well.      Electronically signed by Dong Solano RN on 9/20/2023 at 11:39 AM

## 2023-09-21 ENCOUNTER — HOSPITAL ENCOUNTER (OUTPATIENT)
Dept: HYPERBARIC MEDICINE | Age: 63
Discharge: HOME | End: 2023-09-21
Payer: OTHER GOVERNMENT

## 2023-09-21 ENCOUNTER — HOSPITAL ENCOUNTER (OUTPATIENT)
Dept: WOUND CARE | Age: 63
Discharge: HOME | End: 2023-09-21
Payer: OTHER GOVERNMENT

## 2023-09-21 VITALS
HEART RATE: 78 BPM | TEMPERATURE: 97.7 F | DIASTOLIC BLOOD PRESSURE: 92 MMHG | RESPIRATION RATE: 20 BRPM | SYSTOLIC BLOOD PRESSURE: 176 MMHG

## 2023-09-21 VITALS
SYSTOLIC BLOOD PRESSURE: 146 MMHG | HEART RATE: 83 BPM | DIASTOLIC BLOOD PRESSURE: 59 MMHG | TEMPERATURE: 97.5 F | RESPIRATION RATE: 17 BRPM

## 2023-09-21 DIAGNOSIS — L97.509 DIABETIC FOOT ULCER WITH OSTEOMYELITIS (HCC): ICD-10-CM

## 2023-09-21 DIAGNOSIS — E11.69 DIABETIC FOOT ULCER WITH OSTEOMYELITIS (HCC): ICD-10-CM

## 2023-09-21 DIAGNOSIS — L97.413 DIABETIC ULCER OF RIGHT MIDFOOT ASSOCIATED WITH TYPE 2 DIABETES MELLITUS, WITH NECROSIS OF MUSCLE (HCC): Primary | ICD-10-CM

## 2023-09-21 DIAGNOSIS — E11.9 DIABETES MELLITUS TREATED WITH INSULIN (HCC): ICD-10-CM

## 2023-09-21 DIAGNOSIS — M86.9 DIABETIC FOOT ULCER WITH OSTEOMYELITIS (HCC): Primary | ICD-10-CM

## 2023-09-21 DIAGNOSIS — E11.621 DIABETIC FOOT ULCER WITH OSTEOMYELITIS (HCC): ICD-10-CM

## 2023-09-21 DIAGNOSIS — L97.509 DIABETIC FOOT ULCER WITH OSTEOMYELITIS (HCC): Primary | ICD-10-CM

## 2023-09-21 DIAGNOSIS — Z79.4 DIABETES MELLITUS TREATED WITH INSULIN (HCC): ICD-10-CM

## 2023-09-21 DIAGNOSIS — E11.621 DIABETIC ULCER OF RIGHT MIDFOOT ASSOCIATED WITH TYPE 2 DIABETES MELLITUS, WITH NECROSIS OF MUSCLE (HCC): Primary | ICD-10-CM

## 2023-09-21 DIAGNOSIS — E11.621 DIABETIC FOOT ULCER WITH OSTEOMYELITIS (HCC): Primary | ICD-10-CM

## 2023-09-21 DIAGNOSIS — M86.9 DIABETIC FOOT ULCER WITH OSTEOMYELITIS (HCC): ICD-10-CM

## 2023-09-21 DIAGNOSIS — I73.9 PAD (PERIPHERAL ARTERY DISEASE) (HCC): ICD-10-CM

## 2023-09-21 DIAGNOSIS — E11.69 DIABETIC FOOT ULCER WITH OSTEOMYELITIS (HCC): Primary | ICD-10-CM

## 2023-09-21 LAB
GLUCOSE BLD-MCNC: 205 MG/DL (ref 70–99)
GLUCOSE BLD-MCNC: 295 MG/DL (ref 70–99)

## 2023-09-21 PROCEDURE — 82962 GLUCOSE BLOOD TEST: CPT

## 2023-09-21 PROCEDURE — 97602 WOUND(S) CARE NON-SELECTIVE: CPT

## 2023-09-21 PROCEDURE — G0277 HBOT, FULL BODY CHAMBER, 30M: HCPCS

## 2023-09-21 RX ORDER — IBUPROFEN 200 MG
TABLET ORAL ONCE
OUTPATIENT
Start: 2023-09-21 | End: 2023-09-21

## 2023-09-21 RX ORDER — LIDOCAINE 50 MG/G
OINTMENT TOPICAL ONCE
OUTPATIENT
Start: 2023-09-21 | End: 2023-09-21

## 2023-09-21 RX ORDER — CLOBETASOL PROPIONATE 0.5 MG/G
OINTMENT TOPICAL ONCE
OUTPATIENT
Start: 2023-09-21 | End: 2023-09-21

## 2023-09-21 RX ORDER — LIDOCAINE HYDROCHLORIDE 40 MG/ML
SOLUTION TOPICAL ONCE
OUTPATIENT
Start: 2023-09-21 | End: 2023-09-21

## 2023-09-21 RX ORDER — GENTAMICIN SULFATE 1 MG/G
OINTMENT TOPICAL ONCE
OUTPATIENT
Start: 2023-09-21 | End: 2023-09-21

## 2023-09-21 RX ORDER — BETAMETHASONE DIPROPIONATE 0.05 %
OINTMENT (GRAM) TOPICAL ONCE
OUTPATIENT
Start: 2023-09-21 | End: 2023-09-21

## 2023-09-21 RX ORDER — SODIUM CHLOR/HYPOCHLOROUS ACID 0.033 %
SOLUTION, IRRIGATION IRRIGATION ONCE
OUTPATIENT
Start: 2023-09-21 | End: 2023-09-21

## 2023-09-21 RX ORDER — BACITRACIN ZINC AND POLYMYXIN B SULFATE 500; 1000 [USP'U]/G; [USP'U]/G
OINTMENT TOPICAL ONCE
OUTPATIENT
Start: 2023-09-21 | End: 2023-09-21

## 2023-09-21 RX ORDER — TRIAMCINOLONE ACETONIDE 1 MG/G
OINTMENT TOPICAL ONCE
OUTPATIENT
Start: 2023-09-21 | End: 2023-09-21

## 2023-09-21 RX ORDER — GINSENG 100 MG
CAPSULE ORAL ONCE
OUTPATIENT
Start: 2023-09-21 | End: 2023-09-21

## 2023-09-21 RX ORDER — LIDOCAINE 40 MG/G
CREAM TOPICAL ONCE
OUTPATIENT
Start: 2023-09-21 | End: 2023-09-21

## 2023-09-21 ASSESSMENT — PAIN DESCRIPTION - LOCATION
LOCATION: FOOT
LOCATION: HEAD

## 2023-09-21 ASSESSMENT — PAIN SCALES - GENERAL
PAINLEVEL_OUTOF10: 4
PAINLEVEL_OUTOF10: 3

## 2023-09-21 ASSESSMENT — PAIN DESCRIPTION - DESCRIPTORS: DESCRIPTORS: ACHING

## 2023-09-21 ASSESSMENT — PAIN DESCRIPTION - ORIENTATION: ORIENTATION: RIGHT

## 2023-09-21 ASSESSMENT — PAIN - FUNCTIONAL ASSESSMENT: PAIN_FUNCTIONAL_ASSESSMENT: PREVENTS OR INTERFERES SOME ACTIVE ACTIVITIES AND ADLS

## 2023-09-21 NOTE — DISCHARGE INSTRUCTIONS
Patient Instructions   PHYSICIAN ORDERS AND DISCHARGE INSTRUCTIONS     NOTE: Upon discharge from the  Medical [facility], you will receive a patient experience survey. We would be grateful if you would take the time to fill this survey out. Wound care order history:                 LACY's   Right       Left               Date:              Cultures:                Grafts:                Antibiotics:           Continuing wound care orders and information:                 Residence:                Continue home health care with:               Your wound-care supplies will be provided by: Wound cleansing:               Do not scrub or use excessive force. Wash hands with soap and water before and after dressing changes. Prior to applying a clean dressing, cleanse wound with normal saline, wound cleanser, or mild soap and water. Ask the physician or nurse before getting the wound(s) wet in a shower. General Wound management:              Keep weight off wounds and reposition every 2 hours. Avoid standing for long periods of time. Apply wraps/stockings in AM and remove at bedtime. If swelling is present, elevate legs to the level of the heart or above for 30 minutes 4-5 times a day and/or when sitting. When taking antibiotics take entire prescription as ordered by physician do not stop taking until medicine is all gone. Increase protein intake to promote healing. Orders for this week: 09/14/23      [facility] COMPLET[facility]: Fax # [phone]     Right Plantar Foot Wound - Wash with soap and water, pat dry. Apply Santyl, Stimulen and Sorbact cut and layer to fit and fill wound till flush with periwound. Cover with 2 ABD folded (secured over wound with medipore tape)   Wrap with conform. ( Enclose toes with conform and wrap around ankle) . Secure with tape   Wrap with Ace.    Leave in place until next

## 2023-09-21 NOTE — PATIENT INSTRUCTIONS
PHYSICIAN ORDERS AND DISCHARGE INSTRUCTIONS     NOTE: Upon discharge from the  Medical [facility], you will receive a patient experience survey. We would be grateful if you would take the time to fill this survey out. Wound cleansing:               Do not scrub or use excessive force. Wash hands with soap and water before and after dressing changes. Prior to applying a clean dressing, cleanse wound with normal saline, wound cleanser, or mild soap and water. Ask the physician or nurse before getting the wound(s) wet in a shower. General Wound management:              Keep weight off wounds and reposition every 2 hours. Avoid standing for long periods of time. Apply wraps/stockings in AM and remove at bedtime. If swelling is present, elevate legs to the level of the heart or above for 30 minutes 4-5 times a day and/or when sitting. When taking antibiotics take entire prescription as ordered by physician do not stop taking until medicine is all gone. Increase protein intake to promote healing. Wound Care Notes:                                Orders for this week: 09/21/23      [facility]X COMPLETED [facility]: Fax # [phone]     Right Plantar Foot Wound - Wash with soap and water, pat dry. Apply Santyl, Stimulen and Sorbact cut and layer to fit and fill wound till flush with periwound. Cover with 2 ABD folded (secured over wound with medipore tape)   Wrap with conform. ( Enclose toes with conform and wrap around ankle) . Secure with tape   Wrap with Ace. Leave in place until next visit to Wound care center        Dispense 30 day quantity when ordering supplies. Follow up with Arslan Decker CNP in 1 week(s) in the wound care center. Call [phone] for any questions or concerns.

## 2023-09-22 ENCOUNTER — HOSPITAL ENCOUNTER (OUTPATIENT)
Dept: WOUND CARE | Age: 63
Discharge: HOME | End: 2023-09-22
Payer: OTHER GOVERNMENT

## 2023-09-22 ENCOUNTER — APPOINTMENT (OUTPATIENT)
Dept: HYPERBARIC MEDICINE | Age: 63
End: 2023-09-22
Payer: OTHER GOVERNMENT

## 2023-09-22 VITALS
HEART RATE: 82 BPM | DIASTOLIC BLOOD PRESSURE: 68 MMHG | RESPIRATION RATE: 18 BRPM | SYSTOLIC BLOOD PRESSURE: 110 MMHG | TEMPERATURE: 97.6 F

## 2023-09-22 PROCEDURE — 99213 OFFICE O/P EST LOW 20 MIN: CPT

## 2023-09-25 ENCOUNTER — HOSPITAL ENCOUNTER (OUTPATIENT)
Dept: HYPERBARIC MEDICINE | Age: 63
Discharge: HOME | End: 2023-09-25
Payer: OTHER GOVERNMENT

## 2023-09-25 ENCOUNTER — HOSPITAL ENCOUNTER (OUTPATIENT)
Dept: WOUND CARE | Age: 63
Discharge: HOME | End: 2023-09-25
Payer: OTHER GOVERNMENT

## 2023-09-25 VITALS
DIASTOLIC BLOOD PRESSURE: 83 MMHG | SYSTOLIC BLOOD PRESSURE: 151 MMHG | TEMPERATURE: 97.2 F | RESPIRATION RATE: 16 BRPM | HEART RATE: 75 BPM

## 2023-09-25 VITALS
DIASTOLIC BLOOD PRESSURE: 60 MMHG | SYSTOLIC BLOOD PRESSURE: 98 MMHG | HEART RATE: 74 BPM | TEMPERATURE: 97.6 F | RESPIRATION RATE: 18 BRPM

## 2023-09-25 DIAGNOSIS — M86.9 DIABETIC FOOT ULCER WITH OSTEOMYELITIS (HCC): ICD-10-CM

## 2023-09-25 DIAGNOSIS — E11.69 DIABETIC FOOT ULCER WITH OSTEOMYELITIS (HCC): Primary | ICD-10-CM

## 2023-09-25 DIAGNOSIS — L97.413 DIABETIC ULCER OF RIGHT MIDFOOT ASSOCIATED WITH TYPE 2 DIABETES MELLITUS, WITH NECROSIS OF MUSCLE (HCC): Primary | ICD-10-CM

## 2023-09-25 DIAGNOSIS — L97.509 DIABETIC FOOT ULCER WITH OSTEOMYELITIS (HCC): Primary | ICD-10-CM

## 2023-09-25 DIAGNOSIS — M86.9 DIABETIC FOOT ULCER WITH OSTEOMYELITIS (HCC): Primary | ICD-10-CM

## 2023-09-25 DIAGNOSIS — E11.621 DIABETIC FOOT ULCER WITH OSTEOMYELITIS (HCC): Primary | ICD-10-CM

## 2023-09-25 DIAGNOSIS — E11.621 DIABETIC ULCER OF RIGHT MIDFOOT ASSOCIATED WITH TYPE 2 DIABETES MELLITUS, WITH NECROSIS OF MUSCLE (HCC): Primary | ICD-10-CM

## 2023-09-25 DIAGNOSIS — E11.621 DIABETIC FOOT ULCER WITH OSTEOMYELITIS (HCC): ICD-10-CM

## 2023-09-25 DIAGNOSIS — L97.509 DIABETIC FOOT ULCER WITH OSTEOMYELITIS (HCC): ICD-10-CM

## 2023-09-25 DIAGNOSIS — E11.9 DIABETES MELLITUS TREATED WITH INSULIN (HCC): ICD-10-CM

## 2023-09-25 DIAGNOSIS — E11.69 DIABETIC FOOT ULCER WITH OSTEOMYELITIS (HCC): ICD-10-CM

## 2023-09-25 DIAGNOSIS — Z79.4 DIABETES MELLITUS TREATED WITH INSULIN (HCC): ICD-10-CM

## 2023-09-25 DIAGNOSIS — I73.9 PAD (PERIPHERAL ARTERY DISEASE) (HCC): ICD-10-CM

## 2023-09-25 LAB
GLUCOSE BLD-MCNC: 173 MG/DL (ref 70–99)
GLUCOSE BLD-MCNC: 210 MG/DL (ref 70–99)

## 2023-09-25 PROCEDURE — 99183 HYPERBARIC OXYGEN THERAPY: CPT | Performed by: NURSE PRACTITIONER

## 2023-09-25 PROCEDURE — G0277 HBOT, FULL BODY CHAMBER, 30M: HCPCS

## 2023-09-25 PROCEDURE — 11043 DBRDMT MUSC&/FSCA 1ST 20/<: CPT

## 2023-09-25 PROCEDURE — 82962 GLUCOSE BLOOD TEST: CPT

## 2023-09-25 PROCEDURE — 11046 DBRDMT MUSC&/FSCA EA ADDL: CPT

## 2023-09-25 RX ORDER — GINSENG 100 MG
CAPSULE ORAL ONCE
OUTPATIENT
Start: 2023-09-25 | End: 2023-09-25

## 2023-09-25 RX ORDER — LIDOCAINE HYDROCHLORIDE 40 MG/ML
SOLUTION TOPICAL ONCE
OUTPATIENT
Start: 2023-09-25 | End: 2023-09-25

## 2023-09-25 RX ORDER — LIDOCAINE 50 MG/G
OINTMENT TOPICAL ONCE
OUTPATIENT
Start: 2023-09-25 | End: 2023-09-25

## 2023-09-25 RX ORDER — BETAMETHASONE DIPROPIONATE 0.05 %
OINTMENT (GRAM) TOPICAL ONCE
OUTPATIENT
Start: 2023-09-25 | End: 2023-09-25

## 2023-09-25 RX ORDER — CLOBETASOL PROPIONATE 0.5 MG/G
OINTMENT TOPICAL ONCE
OUTPATIENT
Start: 2023-09-25 | End: 2023-09-25

## 2023-09-25 RX ORDER — BACITRACIN ZINC AND POLYMYXIN B SULFATE 500; 1000 [USP'U]/G; [USP'U]/G
OINTMENT TOPICAL ONCE
OUTPATIENT
Start: 2023-09-25 | End: 2023-09-25

## 2023-09-25 RX ORDER — TRIAMCINOLONE ACETONIDE 1 MG/G
OINTMENT TOPICAL ONCE
OUTPATIENT
Start: 2023-09-25 | End: 2023-09-25

## 2023-09-25 RX ORDER — SODIUM CHLOR/HYPOCHLOROUS ACID 0.033 %
SOLUTION, IRRIGATION IRRIGATION ONCE
OUTPATIENT
Start: 2023-09-25 | End: 2023-09-25

## 2023-09-25 RX ORDER — LIDOCAINE 40 MG/G
CREAM TOPICAL ONCE
OUTPATIENT
Start: 2023-09-25 | End: 2023-09-25

## 2023-09-25 RX ORDER — GENTAMICIN SULFATE 1 MG/G
OINTMENT TOPICAL ONCE
OUTPATIENT
Start: 2023-09-25 | End: 2023-09-25

## 2023-09-25 RX ORDER — IBUPROFEN 200 MG
TABLET ORAL ONCE
OUTPATIENT
Start: 2023-09-25 | End: 2023-09-25

## 2023-09-25 ASSESSMENT — PAIN DESCRIPTION - ONSET: ONSET: ON-GOING

## 2023-09-25 ASSESSMENT — PAIN DESCRIPTION - DESCRIPTORS: DESCRIPTORS: BURNING

## 2023-09-25 ASSESSMENT — PAIN SCALES - GENERAL: PAINLEVEL_OUTOF10: 2

## 2023-09-25 ASSESSMENT — PAIN DESCRIPTION - ORIENTATION: ORIENTATION: RIGHT

## 2023-09-25 ASSESSMENT — PAIN DESCRIPTION - FREQUENCY: FREQUENCY: INTERMITTENT

## 2023-09-25 ASSESSMENT — PAIN DESCRIPTION - LOCATION: LOCATION: FOOT

## 2023-09-25 NOTE — PROGRESS NOTES
Nonselective enzymatic debridement performed with Santyl per physician order to wound(s) of the Right Foot  Patient tolerated the procedure well.    Electronically signed by Cindi Rosenbaum RN on 9/25/2023 at 10:17 AM
physician or nurse before getting the wound(s) wet in a shower. General Wound management:              Keep weight off wounds and reposition every 2 hours. Avoid standing for long periods of time. Apply wraps/stockings in AM and remove at bedtime. If swelling is present, elevate legs to the level of the heart or above for 30 minutes 4-5 times a day and/or when sitting. When taking antibiotics take entire prescription as ordered by physician do not stop taking until medicine is all gone. Increase protein intake to promote healing. Wound Care Notes:                                Orders for this week: 09/25/23      FAX COMPLETED [facility]: Fax # [phone]     Right Plantar Foot Wound - Wash with soap and water, pat dry. Apply Santyl, Stimulen and Sorbact cut and layer to fit and fill wound till flush with periwound. Cover with 2 ABD folded (secured over wound with medipore tape)   Wrap with conform. ( Enclose toes with conform and wrap around ankle) . Secure with tape   Wrap with Ace. Leave in place until next visit to Wound care center        Dispense 30 day quantity when ordering supplies. Follow up with Keara Navarro CNP in 1 week(s) in the wound care center. Call [phone] for any questions or concerns.      Electronically signed by ROBIN Finney CNP on 9/25/2023 at 12:35 PM

## 2023-09-25 NOTE — PATIENT INSTRUCTIONS
PHYSICIAN ORDERS AND DISCHARGE INSTRUCTIONS     NOTE: Upon discharge from the  Medical [facility], you will receive a patient experience survey. We would be grateful if you would take the time to fill this survey out. Wound cleansing:               Do not scrub or use excessive force. Wash hands with soap and water before and after dressing changes. Prior to applying a clean dressing, cleanse wound with normal saline, wound cleanser, or mild soap and water. Ask the physician or nurse before getting the wound(s) wet in a shower. General Wound management:              Keep weight off wounds and reposition every 2 hours. Avoid standing for long periods of time. Apply wraps/stockings in AM and remove at bedtime. If swelling is present, elevate legs to the level of the heart or above for 30 minutes 4-5 times a day and/or when sitting. When taking antibiotics take entire prescription as ordered by physician do not stop taking until medicine is all gone. Increase protein intake to promote healing. Wound Care Notes:                                Orders for this week: 09/25/23      [facility]X COMPLETED [facility]: Fax # [phone]     Right Plantar Foot Wound - Wash with soap and water, pat dry. Apply Santyl, Stimulen and Sorbact cut and layer to fit and fill wound till flush with periwound. Cover with 2 ABD folded (secured over wound with medipore tape)   Wrap with conform. ( Enclose toes with conform and wrap around ankle) . Secure with tape   Wrap with Ace. Leave in place until next visit to Wound care center        Dispense 30 day quantity when ordering supplies. Follow up with Nelda Sosa CNP in 1 week(s) in the wound care center. Call [phone] for any questions or concerns.

## 2023-09-26 ENCOUNTER — HOSPITAL ENCOUNTER (OUTPATIENT)
Dept: HYPERBARIC MEDICINE | Age: 63
Discharge: HOME | End: 2023-09-26

## 2023-09-27 ENCOUNTER — HOSPITAL ENCOUNTER (OUTPATIENT)
Dept: WOUND CARE | Age: 63
Discharge: HOME | End: 2023-09-27
Payer: OTHER GOVERNMENT

## 2023-09-27 ENCOUNTER — HOSPITAL ENCOUNTER (OUTPATIENT)
Dept: HYPERBARIC MEDICINE | Age: 63
Discharge: HOME | End: 2023-09-27
Payer: OTHER GOVERNMENT

## 2023-09-27 VITALS
SYSTOLIC BLOOD PRESSURE: 160 MMHG | DIASTOLIC BLOOD PRESSURE: 78 MMHG | RESPIRATION RATE: 16 BRPM | TEMPERATURE: 97 F | HEART RATE: 76 BPM

## 2023-09-27 DIAGNOSIS — M86.9 DIABETIC FOOT ULCER WITH OSTEOMYELITIS (HCC): Primary | ICD-10-CM

## 2023-09-27 DIAGNOSIS — L97.509 DIABETIC FOOT ULCER WITH OSTEOMYELITIS (HCC): Primary | ICD-10-CM

## 2023-09-27 DIAGNOSIS — E11.69 DIABETIC FOOT ULCER WITH OSTEOMYELITIS (HCC): Primary | ICD-10-CM

## 2023-09-27 DIAGNOSIS — E11.621 DIABETIC FOOT ULCER WITH OSTEOMYELITIS (HCC): Primary | ICD-10-CM

## 2023-09-27 LAB
GLUCOSE BLD-MCNC: 181 MG/DL (ref 70–99)
GLUCOSE BLD-MCNC: 207 MG/DL (ref 70–99)

## 2023-09-27 PROCEDURE — 97602 WOUND(S) CARE NON-SELECTIVE: CPT

## 2023-09-27 PROCEDURE — 82962 GLUCOSE BLOOD TEST: CPT

## 2023-09-27 PROCEDURE — G0277 HBOT, FULL BODY CHAMBER, 30M: HCPCS

## 2023-09-27 ASSESSMENT — PAIN SCALES - GENERAL: PAINLEVEL_OUTOF10: 0

## 2023-09-27 NOTE — PROGRESS NOTES
Wound cleaned and redressed per order from most recent provider visit. Santyl, Stimulen, Sorbact, Sorbex, Conform, ACE.   Electronically signed by Cris Workman RN on 9/27/2023 at 12:28 PM

## 2023-09-27 NOTE — PROGRESS NOTES
Nonselective enzymatic debridement performed with Santyl per physician order to wound(s) of the Right Plantar Foot. Patient tolerated the procedure well.      Electronically signed by Noah Rosenthal RN on 9/27/2023 at 12:30 PM

## 2023-09-29 ENCOUNTER — HOSPITAL ENCOUNTER (OUTPATIENT)
Dept: HYPERBARIC MEDICINE | Age: 63
Discharge: HOME | End: 2023-09-29
Payer: OTHER GOVERNMENT

## 2023-09-29 ENCOUNTER — HOSPITAL ENCOUNTER (OUTPATIENT)
Dept: WOUND CARE | Age: 63
Discharge: HOME | End: 2023-09-29
Payer: OTHER GOVERNMENT

## 2023-09-29 VITALS
RESPIRATION RATE: 16 BRPM | DIASTOLIC BLOOD PRESSURE: 79 MMHG | TEMPERATURE: 96.9 F | HEART RATE: 74 BPM | SYSTOLIC BLOOD PRESSURE: 165 MMHG

## 2023-09-29 DIAGNOSIS — M86.9 DIABETIC FOOT ULCER WITH OSTEOMYELITIS (HCC): Primary | ICD-10-CM

## 2023-09-29 DIAGNOSIS — E11.621 DIABETIC FOOT ULCER WITH OSTEOMYELITIS (HCC): Primary | ICD-10-CM

## 2023-09-29 DIAGNOSIS — E11.69 DIABETIC FOOT ULCER WITH OSTEOMYELITIS (HCC): Primary | ICD-10-CM

## 2023-09-29 DIAGNOSIS — L97.509 DIABETIC FOOT ULCER WITH OSTEOMYELITIS (HCC): Primary | ICD-10-CM

## 2023-09-29 LAB
GLUCOSE BLD-MCNC: 180 MG/DL (ref 70–99)
GLUCOSE BLD-MCNC: 219 MG/DL (ref 70–99)

## 2023-09-29 PROCEDURE — 82962 GLUCOSE BLOOD TEST: CPT

## 2023-09-29 PROCEDURE — 97602 WOUND(S) CARE NON-SELECTIVE: CPT

## 2023-09-29 PROCEDURE — G0277 HBOT, FULL BODY CHAMBER, 30M: HCPCS

## 2023-09-29 ASSESSMENT — PAIN DESCRIPTION - PAIN TYPE
TYPE: CHRONIC PAIN
TYPE: CHRONIC PAIN

## 2023-09-29 ASSESSMENT — PAIN DESCRIPTION - ORIENTATION
ORIENTATION: RIGHT
ORIENTATION: RIGHT

## 2023-09-29 ASSESSMENT — PAIN DESCRIPTION - ONSET
ONSET: ON-GOING
ONSET: ON-GOING

## 2023-09-29 ASSESSMENT — PAIN SCALES - GENERAL
PAINLEVEL_OUTOF10: 4
PAINLEVEL_OUTOF10: 4

## 2023-09-29 ASSESSMENT — PAIN DESCRIPTION - FREQUENCY
FREQUENCY: CONTINUOUS
FREQUENCY: CONTINUOUS

## 2023-09-29 ASSESSMENT — PAIN DESCRIPTION - DESCRIPTORS
DESCRIPTORS: THROBBING
DESCRIPTORS: THROBBING

## 2023-09-29 ASSESSMENT — PAIN DESCRIPTION - LOCATION
LOCATION: FOOT
LOCATION: FOOT

## 2023-09-29 NOTE — PATIENT INSTRUCTIONS
PHYSICIAN ORDERS AND DISCHARGE INSTRUCTIONS     NOTE: Upon discharge from the  Medical [facility], you will receive a patient experience survey. We would be grateful if you would take the time to fill this survey out. Wound cleansing:               Do not scrub or use excessive force. Wash hands with soap and water before and after dressing changes. Prior to applying a clean dressing, cleanse wound with normal saline, wound cleanser, or mild soap and water. Ask the physician or nurse before getting the wound(s) wet in a shower. General Wound management:              Keep weight off wounds and reposition every 2 hours. Avoid standing for long periods of time. Apply wraps/stockings in AM and remove at bedtime. If swelling is present, elevate legs to the level of the heart or above for 30 minutes 4-5 times a day and/or when sitting. When taking antibiotics take entire prescription as ordered by physician do not stop taking until medicine is all gone. Increase protein intake to promote healing. Wound Care Notes:                                Orders for this week: 09/25/23      [facility]X COMPLETED [facility]: Fax # [phone]     Right Plantar Foot Wound - Wash with soap and water, pat dry. Apply Santyl, Stimulen and Sorbact cut and layer to fit and fill wound till flush with periwound. Cover with 2 ABD folded (secured over wound with medipore tape)   Wrap with conform. (Enclose toes with conform and wrap around ankle). Secure with tape   Wrap with Ace. Leave in place until next visit to Wound care center        Dispense 30 day quantity when ordering supplies. Follow up with Samantha Chow CNP in 1 week(s) in the wound care center. Call [phone] for any questions or concerns.

## 2023-10-02 ENCOUNTER — HOSPITAL ENCOUNTER (OUTPATIENT)
Dept: HYPERBARIC MEDICINE | Age: 63
Discharge: HOME | End: 2023-10-02
Payer: OTHER GOVERNMENT

## 2023-10-02 ENCOUNTER — HOSPITAL ENCOUNTER (OUTPATIENT)
Dept: WOUND CARE | Age: 63
Discharge: HOME | End: 2023-10-02
Payer: OTHER GOVERNMENT

## 2023-10-02 VITALS
DIASTOLIC BLOOD PRESSURE: 78 MMHG | TEMPERATURE: 96.9 F | SYSTOLIC BLOOD PRESSURE: 172 MMHG | RESPIRATION RATE: 16 BRPM | HEART RATE: 85 BPM

## 2023-10-02 DIAGNOSIS — L97.413 DIABETIC ULCER OF RIGHT MIDFOOT ASSOCIATED WITH TYPE 2 DIABETES MELLITUS, WITH NECROSIS OF MUSCLE (HCC): Primary | ICD-10-CM

## 2023-10-02 DIAGNOSIS — L97.509 DIABETIC FOOT ULCER WITH OSTEOMYELITIS (HCC): Primary | ICD-10-CM

## 2023-10-02 DIAGNOSIS — Z79.4 DIABETES MELLITUS TREATED WITH INSULIN (HCC): ICD-10-CM

## 2023-10-02 DIAGNOSIS — M86.9 DIABETIC FOOT ULCER WITH OSTEOMYELITIS (HCC): ICD-10-CM

## 2023-10-02 DIAGNOSIS — E11.69 DIABETIC FOOT ULCER WITH OSTEOMYELITIS (HCC): ICD-10-CM

## 2023-10-02 DIAGNOSIS — M86.9 DIABETIC FOOT ULCER WITH OSTEOMYELITIS (HCC): Primary | ICD-10-CM

## 2023-10-02 DIAGNOSIS — L97.413 DIABETIC ULCER OF RIGHT MIDFOOT ASSOCIATED WITH TYPE 2 DIABETES MELLITUS, WITH NECROSIS OF MUSCLE (HCC): ICD-10-CM

## 2023-10-02 DIAGNOSIS — E11.69 DIABETIC FOOT ULCER WITH OSTEOMYELITIS (HCC): Primary | ICD-10-CM

## 2023-10-02 DIAGNOSIS — E11.621 DIABETIC ULCER OF RIGHT MIDFOOT ASSOCIATED WITH TYPE 2 DIABETES MELLITUS, WITH NECROSIS OF MUSCLE (HCC): ICD-10-CM

## 2023-10-02 DIAGNOSIS — E11.621 DIABETIC FOOT ULCER WITH OSTEOMYELITIS (HCC): Primary | ICD-10-CM

## 2023-10-02 DIAGNOSIS — E11.621 DIABETIC ULCER OF RIGHT MIDFOOT ASSOCIATED WITH TYPE 2 DIABETES MELLITUS, WITH NECROSIS OF MUSCLE (HCC): Primary | ICD-10-CM

## 2023-10-02 DIAGNOSIS — E11.9 DIABETES MELLITUS TREATED WITH INSULIN (HCC): ICD-10-CM

## 2023-10-02 DIAGNOSIS — L97.509 DIABETIC FOOT ULCER WITH OSTEOMYELITIS (HCC): ICD-10-CM

## 2023-10-02 DIAGNOSIS — I73.9 PAD (PERIPHERAL ARTERY DISEASE) (HCC): ICD-10-CM

## 2023-10-02 DIAGNOSIS — E11.621 DIABETIC FOOT ULCER WITH OSTEOMYELITIS (HCC): ICD-10-CM

## 2023-10-02 LAB
GLUCOSE BLD-MCNC: 172 MG/DL (ref 70–99)
GLUCOSE BLD-MCNC: 183 MG/DL (ref 70–99)

## 2023-10-02 PROCEDURE — 11043 DBRDMT MUSC&/FSCA 1ST 20/<: CPT

## 2023-10-02 PROCEDURE — 11042 DBRDMT SUBQ TIS 1ST 20SQCM/<: CPT | Performed by: NURSE PRACTITIONER

## 2023-10-02 PROCEDURE — 99183 HYPERBARIC OXYGEN THERAPY: CPT | Performed by: NURSE PRACTITIONER

## 2023-10-02 PROCEDURE — 11045 DBRDMT SUBQ TISS EACH ADDL: CPT

## 2023-10-02 PROCEDURE — G0277 HBOT, FULL BODY CHAMBER, 30M: HCPCS

## 2023-10-02 PROCEDURE — 11042 DBRDMT SUBQ TIS 1ST 20SQCM/<: CPT

## 2023-10-02 PROCEDURE — 11046 DBRDMT MUSC&/FSCA EA ADDL: CPT

## 2023-10-02 PROCEDURE — 82962 GLUCOSE BLOOD TEST: CPT

## 2023-10-02 RX ORDER — IBUPROFEN 200 MG
TABLET ORAL ONCE
OUTPATIENT
Start: 2023-10-02 | End: 2023-10-02

## 2023-10-02 RX ORDER — SODIUM CHLOR/HYPOCHLOROUS ACID 0.033 %
SOLUTION, IRRIGATION IRRIGATION ONCE
OUTPATIENT
Start: 2023-10-02 | End: 2023-10-02

## 2023-10-02 RX ORDER — TRIAMCINOLONE ACETONIDE 1 MG/G
OINTMENT TOPICAL ONCE
OUTPATIENT
Start: 2023-10-02 | End: 2023-10-02

## 2023-10-02 RX ORDER — GINSENG 100 MG
CAPSULE ORAL ONCE
OUTPATIENT
Start: 2023-10-02 | End: 2023-10-02

## 2023-10-02 RX ORDER — BETAMETHASONE DIPROPIONATE 0.05 %
OINTMENT (GRAM) TOPICAL ONCE
OUTPATIENT
Start: 2023-10-02 | End: 2023-10-02

## 2023-10-02 RX ORDER — GENTAMICIN SULFATE 1 MG/G
OINTMENT TOPICAL ONCE
OUTPATIENT
Start: 2023-10-02 | End: 2023-10-02

## 2023-10-02 RX ORDER — BACITRACIN ZINC AND POLYMYXIN B SULFATE 500; 1000 [USP'U]/G; [USP'U]/G
OINTMENT TOPICAL ONCE
OUTPATIENT
Start: 2023-10-02 | End: 2023-10-02

## 2023-10-02 RX ORDER — LIDOCAINE 50 MG/G
OINTMENT TOPICAL ONCE
OUTPATIENT
Start: 2023-10-02 | End: 2023-10-02

## 2023-10-02 RX ORDER — CLOBETASOL PROPIONATE 0.5 MG/G
OINTMENT TOPICAL ONCE
OUTPATIENT
Start: 2023-10-02 | End: 2023-10-02

## 2023-10-02 RX ORDER — LIDOCAINE HYDROCHLORIDE 40 MG/ML
SOLUTION TOPICAL ONCE
OUTPATIENT
Start: 2023-10-02 | End: 2023-10-02

## 2023-10-02 RX ORDER — LIDOCAINE 40 MG/G
CREAM TOPICAL ONCE
OUTPATIENT
Start: 2023-10-02 | End: 2023-10-02

## 2023-10-02 NOTE — PATIENT INSTRUCTIONS
PHYSICIAN ORDERS AND DISCHARGE INSTRUCTIONS     NOTE: Upon discharge from the  Medical [facility], you will receive a patient experience survey. We would be grateful if you would take the time to fill this survey out. Wound cleansing:               Do not scrub or use excessive force. Wash hands with soap and water before and after dressing changes. Prior to applying a clean dressing, cleanse wound with normal saline, wound cleanser, or mild soap and water. Ask the physician or nurse before getting the wound(s) wet in a shower. General Wound management:              Keep weight off wounds and reposition every 2 hours. Avoid standing for long periods of time. Apply wraps/stockings in AM and remove at bedtime. If swelling is present, elevate legs to the level of the heart or above for 30 minutes 4-5 times a day and/or when sitting. When taking antibiotics take entire prescription as ordered by physician do not stop taking until medicine is all gone. Increase protein intake to promote healing. Wound Care Notes:                                Orders for this week: 10/02/23      FAX COMPLETED [facility]: Fax # [phone]     Right Plantar Foot Wound - Wash with soap and water, pat dry. Apply Santyl, Stimulen and Sorbact cut and layer to fit and fill wound till flush with periwound. Cover with 2 sorbex , cut slit in the first and layer the second on top (secured over wound with medipore tape)   Wrap with conform. (Enclose toes with conform and wrap around ankle). Secure with tape   Wrap with Ace. Leave in place until next visit to Wound care center        Dispense 30 day quantity when ordering supplies. Follow up with Fawn Cervantes CNP in 1 week(s) in the wound care center. Call [phone] for any questions or concerns.

## 2023-10-03 ENCOUNTER — HOSPITAL ENCOUNTER (OUTPATIENT)
Dept: HYPERBARIC MEDICINE | Age: 63
Discharge: HOME | End: 2023-10-03
Payer: OTHER GOVERNMENT

## 2023-10-03 VITALS
HEART RATE: 82 BPM | TEMPERATURE: 96.9 F | SYSTOLIC BLOOD PRESSURE: 162 MMHG | RESPIRATION RATE: 16 BRPM | DIASTOLIC BLOOD PRESSURE: 83 MMHG

## 2023-10-03 DIAGNOSIS — M86.9 DIABETIC FOOT ULCER WITH OSTEOMYELITIS (HCC): Primary | ICD-10-CM

## 2023-10-03 DIAGNOSIS — E11.69 DIABETIC FOOT ULCER WITH OSTEOMYELITIS (HCC): Primary | ICD-10-CM

## 2023-10-03 DIAGNOSIS — E11.621 DIABETIC FOOT ULCER WITH OSTEOMYELITIS (HCC): Primary | ICD-10-CM

## 2023-10-03 DIAGNOSIS — L97.509 DIABETIC FOOT ULCER WITH OSTEOMYELITIS (HCC): Primary | ICD-10-CM

## 2023-10-03 LAB
GLUCOSE BLD-MCNC: 171 MG/DL (ref 70–99)
GLUCOSE BLD-MCNC: 215 MG/DL (ref 70–99)

## 2023-10-03 PROCEDURE — 82962 GLUCOSE BLOOD TEST: CPT

## 2023-10-03 PROCEDURE — G0277 HBOT, FULL BODY CHAMBER, 30M: HCPCS

## 2023-10-03 PROCEDURE — 99183 HYPERBARIC OXYGEN THERAPY: CPT | Performed by: NURSE PRACTITIONER

## 2023-10-03 NOTE — PROGRESS NOTES
intake to promote healing. Wound Care Notes:                                Orders for this week: 10/02/23      [facility] COMPLET[facility]: Fax # [phone]     Right Plantar Foot Wound - Wash with soap and water, pat dry. Apply Santyl, Stimulen and Sorbact cut and layer to fit and fill wound till flush with periwound. Cover with 2 sorbex , cut slit in the first and layer the second on top (secured over wound with medipore tape)   Wrap with conform. (Enclose toes with conform and wrap around ankle). Secure with tape   Wrap with Ace. Leave in place until next visit to Wound care center        Dispense 30 day quantity when ordering supplies. Follow up with Hitesh Ferreira CNP in 1 week(s) in the wound care center. Call [phone] for any questions or concerns.      Electronically signed by ROBIN Hernandez CNP on 10/3/2023 at 10:11 AM

## 2023-10-04 ENCOUNTER — HOSPITAL ENCOUNTER (OUTPATIENT)
Dept: HYPERBARIC MEDICINE | Age: 63
Discharge: HOME | End: 2023-10-04
Payer: OTHER GOVERNMENT

## 2023-10-04 VITALS
HEART RATE: 75 BPM | SYSTOLIC BLOOD PRESSURE: 161 MMHG | RESPIRATION RATE: 16 BRPM | TEMPERATURE: 96.9 F | DIASTOLIC BLOOD PRESSURE: 76 MMHG

## 2023-10-04 DIAGNOSIS — L97.509 DIABETIC FOOT ULCER WITH OSTEOMYELITIS (HCC): Primary | ICD-10-CM

## 2023-10-04 DIAGNOSIS — E11.621 DIABETIC FOOT ULCER WITH OSTEOMYELITIS (HCC): Primary | ICD-10-CM

## 2023-10-04 DIAGNOSIS — E11.69 DIABETIC FOOT ULCER WITH OSTEOMYELITIS (HCC): Primary | ICD-10-CM

## 2023-10-04 DIAGNOSIS — M86.9 DIABETIC FOOT ULCER WITH OSTEOMYELITIS (HCC): Primary | ICD-10-CM

## 2023-10-04 LAB
GLUCOSE BLD-MCNC: 144 MG/DL (ref 70–99)
GLUCOSE BLD-MCNC: 175 MG/DL (ref 70–99)

## 2023-10-04 PROCEDURE — 82962 GLUCOSE BLOOD TEST: CPT

## 2023-10-04 PROCEDURE — G0277 HBOT, FULL BODY CHAMBER, 30M: HCPCS

## 2023-10-04 NOTE — PROGRESS NOTES
Delvis Barroso is a 61 y.o. male  did successfully complete today's hyperbaric oxygen treatment at [address]. In my clinical judgement, ongoing HBO therapy is  necessary at this time, given a threat to patient function, limb or life from the current condition. Supervision and attendance of Hyperbaric Oxygen Therapy provided. Continue HBO treatment as outlined in the treatment plan. Hyperbaric Oxygen: Richad Sole tolerated Treatment Number: 22 well today without complications.      Electronically signed by Lawrence Vang MD on 10/4/2023 at 11:37 AM

## 2023-10-05 ENCOUNTER — HOSPITAL ENCOUNTER (OUTPATIENT)
Dept: WOUND CARE | Age: 63
Discharge: HOME | End: 2023-10-05
Payer: OTHER GOVERNMENT

## 2023-10-05 ENCOUNTER — HOSPITAL ENCOUNTER (OUTPATIENT)
Dept: HYPERBARIC MEDICINE | Age: 63
Discharge: HOME | End: 2023-10-05
Payer: OTHER GOVERNMENT

## 2023-10-05 VITALS
DIASTOLIC BLOOD PRESSURE: 68 MMHG | SYSTOLIC BLOOD PRESSURE: 148 MMHG | HEART RATE: 80 BPM | TEMPERATURE: 97.5 F | RESPIRATION RATE: 16 BRPM

## 2023-10-05 VITALS
RESPIRATION RATE: 18 BRPM | SYSTOLIC BLOOD PRESSURE: 138 MMHG | DIASTOLIC BLOOD PRESSURE: 64 MMHG | TEMPERATURE: 98.1 F | HEART RATE: 84 BPM

## 2023-10-05 DIAGNOSIS — E11.621 DIABETIC FOOT ULCER WITH OSTEOMYELITIS (HCC): Primary | ICD-10-CM

## 2023-10-05 DIAGNOSIS — E11.69 DIABETIC FOOT ULCER WITH OSTEOMYELITIS (HCC): Primary | ICD-10-CM

## 2023-10-05 DIAGNOSIS — M86.9 DIABETIC FOOT ULCER WITH OSTEOMYELITIS (HCC): Primary | ICD-10-CM

## 2023-10-05 DIAGNOSIS — L97.509 DIABETIC FOOT ULCER WITH OSTEOMYELITIS (HCC): Primary | ICD-10-CM

## 2023-10-05 LAB
GLUCOSE BLD-MCNC: 186 MG/DL (ref 70–99)
GLUCOSE BLD-MCNC: 245 MG/DL (ref 70–99)

## 2023-10-05 PROCEDURE — G0277 HBOT, FULL BODY CHAMBER, 30M: HCPCS

## 2023-10-05 PROCEDURE — 99183 HYPERBARIC OXYGEN THERAPY: CPT | Performed by: NURSE PRACTITIONER

## 2023-10-05 PROCEDURE — 97602 WOUND(S) CARE NON-SELECTIVE: CPT

## 2023-10-05 PROCEDURE — 99213 OFFICE O/P EST LOW 20 MIN: CPT

## 2023-10-05 PROCEDURE — 82962 GLUCOSE BLOOD TEST: CPT

## 2023-10-05 NOTE — PROGRESS NOTES
Nonselective enzymatic debridement performed with Santyl per physician order to wound(s) of the right plantar foot  Patient tolerated the procedure well.

## 2023-10-06 ENCOUNTER — HOSPITAL ENCOUNTER (OUTPATIENT)
Dept: HYPERBARIC MEDICINE | Age: 63
Discharge: HOME | End: 2023-10-06
Payer: OTHER GOVERNMENT

## 2023-10-06 VITALS
DIASTOLIC BLOOD PRESSURE: 80 MMHG | RESPIRATION RATE: 16 BRPM | TEMPERATURE: 97.6 F | SYSTOLIC BLOOD PRESSURE: 165 MMHG | HEART RATE: 79 BPM

## 2023-10-06 DIAGNOSIS — E11.621 DIABETIC FOOT ULCER WITH OSTEOMYELITIS (HCC): Primary | ICD-10-CM

## 2023-10-06 DIAGNOSIS — M86.9 DIABETIC FOOT ULCER WITH OSTEOMYELITIS (HCC): Primary | ICD-10-CM

## 2023-10-06 DIAGNOSIS — E11.69 DIABETIC FOOT ULCER WITH OSTEOMYELITIS (HCC): Primary | ICD-10-CM

## 2023-10-06 DIAGNOSIS — L97.509 DIABETIC FOOT ULCER WITH OSTEOMYELITIS (HCC): Primary | ICD-10-CM

## 2023-10-06 LAB
GLUCOSE BLD-MCNC: 254 MG/DL (ref 70–99)
GLUCOSE BLD-MCNC: 333 MG/DL (ref 70–99)

## 2023-10-06 PROCEDURE — G0277 HBOT, FULL BODY CHAMBER, 30M: HCPCS

## 2023-10-06 PROCEDURE — 82962 GLUCOSE BLOOD TEST: CPT

## 2023-10-06 NOTE — PROGRESS NOTES
This SN was Instructed by Wesley Rivero CNP on yesterday to perform outer dressing change only today (per patient request ). SN removed old Super absorbers , ABD'S, & ACE WRAP . Old outer dressing noted to have moderate amount of serosanguineous drainage. New Super absorbers , ABD's , ACE Wrap, & tape applied. Patient tolerated well.

## 2023-10-09 ENCOUNTER — HOSPITAL ENCOUNTER (OUTPATIENT)
Dept: HYPERBARIC MEDICINE | Age: 63
Discharge: HOME | End: 2023-10-09
Payer: OTHER GOVERNMENT

## 2023-10-09 ENCOUNTER — HOSPITAL ENCOUNTER (OUTPATIENT)
Dept: WOUND CARE | Age: 63
Discharge: HOME | End: 2023-10-09
Payer: OTHER GOVERNMENT

## 2023-10-09 VITALS
HEART RATE: 84 BPM | SYSTOLIC BLOOD PRESSURE: 175 MMHG | DIASTOLIC BLOOD PRESSURE: 84 MMHG | TEMPERATURE: 97.2 F | RESPIRATION RATE: 16 BRPM

## 2023-10-09 VITALS
TEMPERATURE: 98 F | RESPIRATION RATE: 18 BRPM | HEART RATE: 96 BPM | SYSTOLIC BLOOD PRESSURE: 105 MMHG | DIASTOLIC BLOOD PRESSURE: 43 MMHG

## 2023-10-09 DIAGNOSIS — Z79.4 DIABETES MELLITUS TREATED WITH INSULIN (HCC): ICD-10-CM

## 2023-10-09 DIAGNOSIS — E11.9 DIABETES MELLITUS TREATED WITH INSULIN (HCC): ICD-10-CM

## 2023-10-09 DIAGNOSIS — I73.9 PAD (PERIPHERAL ARTERY DISEASE) (HCC): ICD-10-CM

## 2023-10-09 DIAGNOSIS — E11.621 DIABETIC ULCER OF RIGHT MIDFOOT ASSOCIATED WITH TYPE 2 DIABETES MELLITUS, WITH NECROSIS OF MUSCLE (HCC): Primary | ICD-10-CM

## 2023-10-09 DIAGNOSIS — E11.69 DIABETIC FOOT ULCER WITH OSTEOMYELITIS (HCC): Primary | ICD-10-CM

## 2023-10-09 DIAGNOSIS — L97.509 DIABETIC FOOT ULCER WITH OSTEOMYELITIS (HCC): ICD-10-CM

## 2023-10-09 DIAGNOSIS — E11.69 DIABETIC FOOT ULCER WITH OSTEOMYELITIS (HCC): ICD-10-CM

## 2023-10-09 DIAGNOSIS — M86.9 DIABETIC FOOT ULCER WITH OSTEOMYELITIS (HCC): ICD-10-CM

## 2023-10-09 DIAGNOSIS — L97.509 DIABETIC FOOT ULCER WITH OSTEOMYELITIS (HCC): Primary | ICD-10-CM

## 2023-10-09 DIAGNOSIS — L97.413 DIABETIC ULCER OF RIGHT MIDFOOT ASSOCIATED WITH TYPE 2 DIABETES MELLITUS, WITH NECROSIS OF MUSCLE (HCC): Primary | ICD-10-CM

## 2023-10-09 DIAGNOSIS — M86.9 DIABETIC FOOT ULCER WITH OSTEOMYELITIS (HCC): Primary | ICD-10-CM

## 2023-10-09 DIAGNOSIS — E11.621 DIABETIC FOOT ULCER WITH OSTEOMYELITIS (HCC): ICD-10-CM

## 2023-10-09 DIAGNOSIS — E11.621 DIABETIC FOOT ULCER WITH OSTEOMYELITIS (HCC): Primary | ICD-10-CM

## 2023-10-09 LAB
GLUCOSE BLD-MCNC: 233 MG/DL (ref 70–99)
GLUCOSE BLD-MCNC: 291 MG/DL (ref 70–99)

## 2023-10-09 PROCEDURE — 11045 DBRDMT SUBQ TISS EACH ADDL: CPT

## 2023-10-09 PROCEDURE — 6370000000 HC RX 637 (ALT 250 FOR IP): Performed by: NURSE PRACTITIONER

## 2023-10-09 PROCEDURE — G0277 HBOT, FULL BODY CHAMBER, 30M: HCPCS

## 2023-10-09 PROCEDURE — 11042 DBRDMT SUBQ TIS 1ST 20SQCM/<: CPT | Performed by: NURSE PRACTITIONER

## 2023-10-09 PROCEDURE — 82962 GLUCOSE BLOOD TEST: CPT

## 2023-10-09 PROCEDURE — 11045 DBRDMT SUBQ TISS EACH ADDL: CPT | Performed by: NURSE PRACTITIONER

## 2023-10-09 PROCEDURE — 11042 DBRDMT SUBQ TIS 1ST 20SQCM/<: CPT

## 2023-10-09 RX ORDER — GINSENG 100 MG
CAPSULE ORAL ONCE
OUTPATIENT
Start: 2023-10-09 | End: 2023-10-09

## 2023-10-09 RX ORDER — BETAMETHASONE DIPROPIONATE 0.05 %
OINTMENT (GRAM) TOPICAL ONCE
OUTPATIENT
Start: 2023-10-09 | End: 2023-10-09

## 2023-10-09 RX ORDER — LIDOCAINE 50 MG/G
OINTMENT TOPICAL ONCE
OUTPATIENT
Start: 2023-10-09 | End: 2023-10-09

## 2023-10-09 RX ORDER — LIDOCAINE 40 MG/G
CREAM TOPICAL ONCE
OUTPATIENT
Start: 2023-10-09 | End: 2023-10-09

## 2023-10-09 RX ORDER — BACITRACIN ZINC AND POLYMYXIN B SULFATE 500; 1000 [USP'U]/G; [USP'U]/G
OINTMENT TOPICAL ONCE
OUTPATIENT
Start: 2023-10-09 | End: 2023-10-09

## 2023-10-09 RX ORDER — LIDOCAINE HYDROCHLORIDE 40 MG/ML
SOLUTION TOPICAL ONCE
OUTPATIENT
Start: 2023-10-09 | End: 2023-10-09

## 2023-10-09 RX ORDER — GENTAMICIN SULFATE 1 MG/G
OINTMENT TOPICAL ONCE
OUTPATIENT
Start: 2023-10-09 | End: 2023-10-09

## 2023-10-09 RX ORDER — IBUPROFEN 200 MG
TABLET ORAL ONCE
OUTPATIENT
Start: 2023-10-09 | End: 2023-10-09

## 2023-10-09 RX ORDER — SODIUM CHLOR/HYPOCHLOROUS ACID 0.033 %
SOLUTION, IRRIGATION IRRIGATION ONCE
OUTPATIENT
Start: 2023-10-09 | End: 2023-10-09

## 2023-10-09 RX ORDER — CLOBETASOL PROPIONATE 0.5 MG/G
OINTMENT TOPICAL ONCE
OUTPATIENT
Start: 2023-10-09 | End: 2023-10-09

## 2023-10-09 RX ORDER — TRIAMCINOLONE ACETONIDE 1 MG/G
OINTMENT TOPICAL ONCE
OUTPATIENT
Start: 2023-10-09 | End: 2023-10-09

## 2023-10-09 RX ADMIN — COLLAGENASE SANTYL: 250 OINTMENT TOPICAL at 11:49

## 2023-10-09 ASSESSMENT — PAIN SCALES - GENERAL
PAINLEVEL_OUTOF10: 8
PAINLEVEL_OUTOF10: 0

## 2023-10-09 ASSESSMENT — PAIN DESCRIPTION - FREQUENCY: FREQUENCY: CONTINUOUS

## 2023-10-09 ASSESSMENT — PAIN DESCRIPTION - ONSET: ONSET: ON-GOING

## 2023-10-09 ASSESSMENT — PAIN DESCRIPTION - ORIENTATION: ORIENTATION: RIGHT

## 2023-10-09 ASSESSMENT — PAIN DESCRIPTION - LOCATION: LOCATION: FOOT;ANKLE

## 2023-10-09 ASSESSMENT — PAIN DESCRIPTION - DESCRIPTORS: DESCRIPTORS: THROBBING;SHOOTING

## 2023-10-09 ASSESSMENT — PAIN - FUNCTIONAL ASSESSMENT: PAIN_FUNCTIONAL_ASSESSMENT: PREVENTS OR INTERFERES SOME ACTIVE ACTIVITIES AND ADLS

## 2023-10-09 ASSESSMENT — PAIN DESCRIPTION - PAIN TYPE: TYPE: ACUTE PAIN

## 2023-10-09 NOTE — PLAN OF CARE
Patient agrees to return at 1230 to prepare for HBO dive. Meche Betancourt in room to discuss time of appointments. Patient agrees on time for all appointments for HBO at 1230 this week. Joanne mancilla appointment for HBO this week is 1230.

## 2023-10-09 NOTE — PATIENT INSTRUCTIONS
PHYSICIAN ORDERS AND DISCHARGE INSTRUCTIONS     NOTE: Upon discharge from the  Medical [facility] ([place], [place]), you will receive a patient experience survey. We would be grateful if you would take the time to fill this survey out. Wound cleansing:               Do not scrub or use excessive force. Wash hands with soap and water before and after dressing changes. Prior to applying a clean dressing, cleanse wound with normal saline, wound cleanser, or mild soap and water. Ask the physician or nurse before getting the wound(s) wet in a shower. General Wound management:              Keep weight off wounds and reposition every 2 hours. Avoid standing for long periods of time. Apply wraps/stockings in AM and remove at bedtime. If swelling is present, elevate legs to the level of the heart or above for 30 minutes 4-5 times a day and/or when sitting. When taking antibiotics take entire prescription as ordered by physician do not stop taking until medicine is all gone. Increase protein intake to promote healing. Wound Care Notes:                                Orders for this week: 10/09/23      FAX COMPLETED [facility]EEK[facility]: Fax # [phone]     Right Plantar Foot Wound - Wash with soap and water, pat dry. Apply Santyl, Stimulen and Sorbact cut and layer to fit and fill wound till flush with periwound. Cover with 2 sorbex , cut slit in the first and layer the second on top (secured over wound with medipore tape)   Wrap with conform. (Enclose toes with conform and wrap around ankle). Secure with tape   Wrap with Ace. Change Monday and Thursdays   Leave in place until next visit to Wound care center          Dispense 30 day quantity when ordering supplies. Follow up with Shannon Linares CNP in 1 week(s) in the wound care center. Call [phone] for any questions or concerns.

## 2023-10-09 NOTE — PROGRESS NOTES
Wound Care Center Progress Note With Procedure    Aleksandr Brown  AGE: 61 y.o. GENDER: male  : 1960  EPISODE DATE:  10/9/2023     Subjective:     Chief Complaint   Patient presents with    Wound Check     Right foot          HISTORY of PRESENT ILLNESS      Aleksandr Brown is a 61 y.o. male who presents to the [address] [place] [address] for a visit for evaluation and treatment of Chronic diabetic ulcer(s) of  right foot. The condition is of marked severity. The ulcer has been present for several months at initial visit to the wound clinic. The underlying cause is thought to be diabetes complicated PAD and infection with osteomyelitis. The patients care to date has included care at the [address] [place] [address], currently inpatient receiving IV antibiotics. The patient has significant underlying medical conditions as below. Monty Cohn is PCP. The patient is a left BKA. Wound Pain Timing/Severity: intermittent, mild  Quality of pain: dull, aching, tender  Severity of pain:  2 / 10   Modifying Factors: diabetes, poor glucose control, chronic pressure, shear force, arterial insufficiency, and anticoagulation therapy  Associated Signs/Symptoms: edema, erythema, drainage, and pain     LACY: as indicated base on wound location and assessment       Wound infection: wound culture will be obtained as needed for symptoms of infection           Arterial evaluation: if indicated based on wound, location, symptoms and healing     Venous Evaluation: if indicated based on wound, location, symptoms and healing     Radiology:          No LMP for male patient. Diabetes: On an insulin regimen. Diabetes education provided:  Diabetes pathoetiology, difference between type 1 and type 2 diabetes, and progressive nature of Type 2 DM. Metabolic syndrome: association of diabetes with dyslipidemia, HTN and obesity. Diabetic Neuropathy: signs and therapy.   Foot care: advised to wash feet daily, pat dry and apply lotion at night, avoiding between

## 2023-10-10 ENCOUNTER — HOSPITAL ENCOUNTER (OUTPATIENT)
Dept: HYPERBARIC MEDICINE | Age: 63
Discharge: HOME | End: 2023-10-10
Payer: OTHER GOVERNMENT

## 2023-10-10 VITALS
SYSTOLIC BLOOD PRESSURE: 171 MMHG | TEMPERATURE: 97.6 F | DIASTOLIC BLOOD PRESSURE: 83 MMHG | HEART RATE: 87 BPM | RESPIRATION RATE: 16 BRPM

## 2023-10-10 DIAGNOSIS — E11.69 DIABETIC FOOT ULCER WITH OSTEOMYELITIS (HCC): Primary | ICD-10-CM

## 2023-10-10 DIAGNOSIS — M86.9 DIABETIC FOOT ULCER WITH OSTEOMYELITIS (HCC): Primary | ICD-10-CM

## 2023-10-10 DIAGNOSIS — E11.621 DIABETIC FOOT ULCER WITH OSTEOMYELITIS (HCC): Primary | ICD-10-CM

## 2023-10-10 DIAGNOSIS — L97.509 DIABETIC FOOT ULCER WITH OSTEOMYELITIS (HCC): Primary | ICD-10-CM

## 2023-10-10 LAB
GLUCOSE BLD-MCNC: 209 MG/DL (ref 70–99)
GLUCOSE BLD-MCNC: 264 MG/DL (ref 70–99)

## 2023-10-10 PROCEDURE — 99183 HYPERBARIC OXYGEN THERAPY: CPT | Performed by: NURSE PRACTITIONER

## 2023-10-10 PROCEDURE — 82962 GLUCOSE BLOOD TEST: CPT

## 2023-10-10 PROCEDURE — G0277 HBOT, FULL BODY CHAMBER, 30M: HCPCS

## 2023-10-10 ASSESSMENT — PAIN DESCRIPTION - PAIN TYPE
TYPE: ACUTE PAIN
TYPE: ACUTE PAIN

## 2023-10-10 ASSESSMENT — PAIN DESCRIPTION - ONSET
ONSET: ON-GOING
ONSET: ON-GOING

## 2023-10-10 ASSESSMENT — PAIN DESCRIPTION - ORIENTATION
ORIENTATION: RIGHT
ORIENTATION: RIGHT

## 2023-10-10 ASSESSMENT — PAIN DESCRIPTION - LOCATION: LOCATION: ANKLE

## 2023-10-10 ASSESSMENT — PAIN DESCRIPTION - FREQUENCY
FREQUENCY: INTERMITTENT
FREQUENCY: INTERMITTENT

## 2023-10-10 ASSESSMENT — PAIN DESCRIPTION - DESCRIPTORS
DESCRIPTORS: THROBBING
DESCRIPTORS: THROBBING

## 2023-10-10 ASSESSMENT — PAIN SCALES - GENERAL
PAINLEVEL_OUTOF10: 5
PAINLEVEL_OUTOF10: 5

## 2023-10-11 ENCOUNTER — HOSPITAL ENCOUNTER (OUTPATIENT)
Dept: HYPERBARIC MEDICINE | Age: 63
Discharge: HOME | End: 2023-10-11
Payer: OTHER GOVERNMENT

## 2023-10-11 VITALS
DIASTOLIC BLOOD PRESSURE: 71 MMHG | HEART RATE: 78 BPM | RESPIRATION RATE: 16 BRPM | SYSTOLIC BLOOD PRESSURE: 153 MMHG | TEMPERATURE: 97.2 F

## 2023-10-11 DIAGNOSIS — E11.621 DIABETIC FOOT ULCER WITH OSTEOMYELITIS (HCC): Primary | ICD-10-CM

## 2023-10-11 DIAGNOSIS — E11.69 DIABETIC FOOT ULCER WITH OSTEOMYELITIS (HCC): Primary | ICD-10-CM

## 2023-10-11 DIAGNOSIS — M86.9 DIABETIC FOOT ULCER WITH OSTEOMYELITIS (HCC): Primary | ICD-10-CM

## 2023-10-11 DIAGNOSIS — L97.509 DIABETIC FOOT ULCER WITH OSTEOMYELITIS (HCC): Primary | ICD-10-CM

## 2023-10-11 LAB
GLUCOSE BLD-MCNC: 203 MG/DL (ref 70–99)
GLUCOSE BLD-MCNC: 289 MG/DL (ref 70–99)

## 2023-10-11 PROCEDURE — 82962 GLUCOSE BLOOD TEST: CPT

## 2023-10-11 PROCEDURE — G0277 HBOT, FULL BODY CHAMBER, 30M: HCPCS

## 2023-10-11 ASSESSMENT — PAIN DESCRIPTION - ORIENTATION
ORIENTATION: RIGHT
ORIENTATION: RIGHT

## 2023-10-11 ASSESSMENT — PAIN DESCRIPTION - ONSET
ONSET: ON-GOING
ONSET: ON-GOING

## 2023-10-11 ASSESSMENT — PAIN DESCRIPTION - DESCRIPTORS
DESCRIPTORS: OTHER (COMMENT)
DESCRIPTORS: OTHER (COMMENT)

## 2023-10-11 ASSESSMENT — PAIN DESCRIPTION - FREQUENCY
FREQUENCY: CONTINUOUS
FREQUENCY: CONTINUOUS

## 2023-10-11 ASSESSMENT — PAIN SCALES - GENERAL
PAINLEVEL_OUTOF10: 5
PAINLEVEL_OUTOF10: 5

## 2023-10-11 ASSESSMENT — PAIN DESCRIPTION - PAIN TYPE
TYPE: ACUTE PAIN
TYPE: ACUTE PAIN

## 2023-10-13 ENCOUNTER — HOSPITAL ENCOUNTER (OUTPATIENT)
Dept: HYPERBARIC MEDICINE | Age: 63
Discharge: HOME | End: 2023-10-13
Payer: OTHER GOVERNMENT

## 2023-10-13 ENCOUNTER — HOSPITAL ENCOUNTER (OUTPATIENT)
Dept: WOUND CARE | Age: 63
Discharge: HOME | End: 2023-10-13
Payer: OTHER GOVERNMENT

## 2023-10-13 VITALS
TEMPERATURE: 97.4 F | SYSTOLIC BLOOD PRESSURE: 178 MMHG | RESPIRATION RATE: 20 BRPM | DIASTOLIC BLOOD PRESSURE: 84 MMHG | HEART RATE: 74 BPM

## 2023-10-13 DIAGNOSIS — E11.69 DIABETIC FOOT ULCER WITH OSTEOMYELITIS (HCC): Primary | ICD-10-CM

## 2023-10-13 DIAGNOSIS — M86.9 DIABETIC FOOT ULCER WITH OSTEOMYELITIS (HCC): Primary | ICD-10-CM

## 2023-10-13 DIAGNOSIS — E11.621 DIABETIC ULCER OF RIGHT MIDFOOT ASSOCIATED WITH TYPE 2 DIABETES MELLITUS, WITH NECROSIS OF MUSCLE (HCC): ICD-10-CM

## 2023-10-13 DIAGNOSIS — Z79.4 DIABETES MELLITUS TREATED WITH INSULIN (HCC): ICD-10-CM

## 2023-10-13 DIAGNOSIS — E11.621 DIABETIC FOOT ULCER WITH OSTEOMYELITIS (HCC): Primary | ICD-10-CM

## 2023-10-13 DIAGNOSIS — L97.413 DIABETIC ULCER OF RIGHT MIDFOOT ASSOCIATED WITH TYPE 2 DIABETES MELLITUS, WITH NECROSIS OF MUSCLE (HCC): ICD-10-CM

## 2023-10-13 DIAGNOSIS — L97.509 DIABETIC FOOT ULCER WITH OSTEOMYELITIS (HCC): Primary | ICD-10-CM

## 2023-10-13 DIAGNOSIS — E11.9 DIABETES MELLITUS TREATED WITH INSULIN (HCC): ICD-10-CM

## 2023-10-13 DIAGNOSIS — I73.9 PAD (PERIPHERAL ARTERY DISEASE) (HCC): ICD-10-CM

## 2023-10-13 LAB
GLUCOSE BLD-MCNC: 136 MG/DL (ref 70–99)
GLUCOSE BLD-MCNC: 173 MG/DL (ref 70–99)

## 2023-10-13 PROCEDURE — 82962 GLUCOSE BLOOD TEST: CPT

## 2023-10-13 PROCEDURE — G0277 HBOT, FULL BODY CHAMBER, 30M: HCPCS

## 2023-10-13 PROCEDURE — 99213 OFFICE O/P EST LOW 20 MIN: CPT

## 2023-10-13 ASSESSMENT — PAIN DESCRIPTION - FREQUENCY
FREQUENCY: CONTINUOUS
FREQUENCY: CONTINUOUS

## 2023-10-13 ASSESSMENT — PAIN DESCRIPTION - ONSET
ONSET: ON-GOING
ONSET: ON-GOING

## 2023-10-13 ASSESSMENT — PAIN DESCRIPTION - PAIN TYPE
TYPE: CHRONIC PAIN
TYPE: ACUTE PAIN

## 2023-10-13 ASSESSMENT — PAIN SCALES - GENERAL
PAINLEVEL_OUTOF10: 8
PAINLEVEL_OUTOF10: 10

## 2023-10-13 ASSESSMENT — PAIN DESCRIPTION - ORIENTATION: ORIENTATION: RIGHT

## 2023-10-13 ASSESSMENT — PAIN DESCRIPTION - LOCATION
LOCATION: FOOT
LOCATION: FOOT

## 2023-10-13 ASSESSMENT — PAIN DESCRIPTION - DESCRIPTORS
DESCRIPTORS: SHOOTING;THROBBING
DESCRIPTORS: THROBBING

## 2023-10-13 NOTE — PROGRESS NOTES
Patient arrived today for Hyperbaric Treatment . Patient asking about getting a dressing change after he saw physicians at the [address] [place] [address] yesterday; Patient unsure of treatment [facility]. This SN was advised earlier today by Javier Valentin CNP to remove outer dressings only ( Ace, super absorber & conform) and apply new ones; however when SN removed soiled Kerlix , ABD's  & 4x4's fell off . SN reported to Pepito marie CNP  and was instructed okay to perform complete dressing change . Cleansed wound with soap & water , then wound cleanser , pat dry, applied Santyl, Stimulen, Sorbact, 2 Sorbex  with slit cut in first layer ,secured with Medipore tape, wrapped with conform, ace wrap , tape. Patient tolerated treatment well.

## 2023-10-13 NOTE — PROGRESS NOTES
treatment at [address]. In my clinical judgement, ongoing HBO therapy is  necessary at this time, given a threat to patient function, limb or life from the current condition. Supervision and attendance of Hyperbaric Oxygen Therapy provided. Continue HBO treatment as outlined in the treatment plan. Hyperbaric Oxygen: Jesse Deleon tolerated Treatment Number: 26 well today without complications.      Electronically signed by ROBIN Fall CNP on 10/10/2023 at 11:59 AM

## 2023-10-16 ENCOUNTER — HOSPITAL ENCOUNTER (OUTPATIENT)
Dept: HYPERBARIC MEDICINE | Age: 63
Discharge: HOME | End: 2023-10-16
Payer: OTHER GOVERNMENT

## 2023-10-16 ENCOUNTER — HOSPITAL ENCOUNTER (OUTPATIENT)
Dept: WOUND CARE | Age: 63
Discharge: HOME | End: 2023-10-16
Payer: OTHER GOVERNMENT

## 2023-10-16 VITALS
RESPIRATION RATE: 16 BRPM | TEMPERATURE: 97.7 F | HEART RATE: 92 BPM | DIASTOLIC BLOOD PRESSURE: 67 MMHG | SYSTOLIC BLOOD PRESSURE: 156 MMHG

## 2023-10-16 DIAGNOSIS — E11.621 DIABETIC ULCER OF RIGHT MIDFOOT ASSOCIATED WITH TYPE 2 DIABETES MELLITUS, WITH NECROSIS OF MUSCLE (HCC): Primary | ICD-10-CM

## 2023-10-16 DIAGNOSIS — I73.9 PAD (PERIPHERAL ARTERY DISEASE) (HCC): ICD-10-CM

## 2023-10-16 DIAGNOSIS — M86.9 DIABETIC FOOT ULCER WITH OSTEOMYELITIS (HCC): ICD-10-CM

## 2023-10-16 DIAGNOSIS — E11.621 DIABETIC FOOT ULCER WITH OSTEOMYELITIS (HCC): ICD-10-CM

## 2023-10-16 DIAGNOSIS — L97.509 DIABETIC FOOT ULCER WITH OSTEOMYELITIS (HCC): ICD-10-CM

## 2023-10-16 DIAGNOSIS — Z79.4 DIABETES MELLITUS TREATED WITH INSULIN (HCC): ICD-10-CM

## 2023-10-16 DIAGNOSIS — E11.69 DIABETIC FOOT ULCER WITH OSTEOMYELITIS (HCC): ICD-10-CM

## 2023-10-16 DIAGNOSIS — E11.9 DIABETES MELLITUS TREATED WITH INSULIN (HCC): ICD-10-CM

## 2023-10-16 DIAGNOSIS — L97.413 DIABETIC ULCER OF RIGHT MIDFOOT ASSOCIATED WITH TYPE 2 DIABETES MELLITUS, WITH NECROSIS OF MUSCLE (HCC): Primary | ICD-10-CM

## 2023-10-16 LAB — GLUCOSE BLD-MCNC: 393 MG/DL (ref 70–99)

## 2023-10-16 PROCEDURE — 11046 DBRDMT MUSC&/FSCA EA ADDL: CPT

## 2023-10-16 PROCEDURE — 6370000000 HC RX 637 (ALT 250 FOR IP): Performed by: NURSE PRACTITIONER

## 2023-10-16 PROCEDURE — 11043 DBRDMT MUSC&/FSCA 1ST 20/<: CPT

## 2023-10-16 PROCEDURE — 82962 GLUCOSE BLOOD TEST: CPT

## 2023-10-16 RX ORDER — BACITRACIN ZINC AND POLYMYXIN B SULFATE 500; 1000 [USP'U]/G; [USP'U]/G
OINTMENT TOPICAL ONCE
OUTPATIENT
Start: 2023-10-16 | End: 2023-10-16

## 2023-10-16 RX ORDER — LIDOCAINE 40 MG/G
CREAM TOPICAL ONCE
OUTPATIENT
Start: 2023-10-16 | End: 2023-10-16

## 2023-10-16 RX ORDER — CLOBETASOL PROPIONATE 0.5 MG/G
OINTMENT TOPICAL ONCE
OUTPATIENT
Start: 2023-10-16 | End: 2023-10-16

## 2023-10-16 RX ORDER — BETAMETHASONE DIPROPIONATE 0.05 %
OINTMENT (GRAM) TOPICAL ONCE
OUTPATIENT
Start: 2023-10-16 | End: 2023-10-16

## 2023-10-16 RX ORDER — LIDOCAINE HYDROCHLORIDE 40 MG/ML
SOLUTION TOPICAL ONCE
OUTPATIENT
Start: 2023-10-16 | End: 2023-10-16

## 2023-10-16 RX ORDER — SODIUM CHLOR/HYPOCHLOROUS ACID 0.033 %
SOLUTION, IRRIGATION IRRIGATION ONCE
OUTPATIENT
Start: 2023-10-16 | End: 2023-10-16

## 2023-10-16 RX ORDER — GENTAMICIN SULFATE 1 MG/G
OINTMENT TOPICAL ONCE
OUTPATIENT
Start: 2023-10-16 | End: 2023-10-16

## 2023-10-16 RX ORDER — TRIAMCINOLONE ACETONIDE 1 MG/G
OINTMENT TOPICAL ONCE
OUTPATIENT
Start: 2023-10-16 | End: 2023-10-16

## 2023-10-16 RX ORDER — LIDOCAINE 50 MG/G
OINTMENT TOPICAL ONCE
OUTPATIENT
Start: 2023-10-16 | End: 2023-10-16

## 2023-10-16 RX ORDER — GINSENG 100 MG
CAPSULE ORAL ONCE
OUTPATIENT
Start: 2023-10-16 | End: 2023-10-16

## 2023-10-16 RX ORDER — IBUPROFEN 200 MG
TABLET ORAL ONCE
OUTPATIENT
Start: 2023-10-16 | End: 2023-10-16

## 2023-10-16 RX ADMIN — SILVER NITRATE APPLICATORS 1 EACH: 25; 75 STICK TOPICAL at 16:45

## 2023-10-16 ASSESSMENT — PAIN DESCRIPTION - ONSET: ONSET: ON-GOING

## 2023-10-16 ASSESSMENT — PAIN DESCRIPTION - PAIN TYPE: TYPE: CHRONIC PAIN

## 2023-10-16 ASSESSMENT — PAIN - FUNCTIONAL ASSESSMENT: PAIN_FUNCTIONAL_ASSESSMENT: PREVENTS OR INTERFERES SOME ACTIVE ACTIVITIES AND ADLS

## 2023-10-16 ASSESSMENT — PAIN DESCRIPTION - LOCATION: LOCATION: OTHER (COMMENT)

## 2023-10-16 ASSESSMENT — PAIN DESCRIPTION - FREQUENCY: FREQUENCY: CONTINUOUS

## 2023-10-16 ASSESSMENT — PAIN SCALES - GENERAL: PAINLEVEL_OUTOF10: 5

## 2023-10-16 ASSESSMENT — PAIN DESCRIPTION - DESCRIPTORS: DESCRIPTORS: SORE

## 2023-10-16 NOTE — PATIENT INSTRUCTIONS
PHYSICIAN ORDERS AND DISCHARGE INSTRUCTIONS     NOTE: Upon discharge from the  Medical [facility] [place], you will receive a patient experience survey. We would be grateful if you would take the time to fill this survey out. Wound cleansing:               Do not scrub or use excessive force. Wash hands with soap and water before and after dressing changes. Prior to applying a clean dressing, cleanse wound with normal saline, wound cleanser, or mild soap and water. Ask the physician or nurse before getting the wound(s) wet in a shower. General Wound management:              Keep weight off wounds and reposition every 2 hours. Avoid standing for long periods of time. Apply wraps/stockings in AM and remove at bedtime. If swelling is present, elevate legs to the level of the heart or above for 30 minutes 4-5 times a day and/or when sitting. When taking antibiotics take entire prescription as ordered by physician do not stop taking until medicine is all gone. Increase protein intake to promote healing. Wound Care Notes:                                Orders for this week: 10/16/23      FAX COMPLETED [facility]: Fax # [phone]     Right Plantar Foot Wound - Wash with soap and water, pat dry. Apply Santyl, Stimulen and Sorbact cut and layer to fit and fill wound till flush with periwound. Cover with 2 sorbex , cut slit in the first and layer the second on top (secured over wound with medipore tape)   Wrap with conform. (Enclose toes with conform and wrap around ankle). Secure with tape   Wrap with Ace. Change Monday and Thursdays   Leave in place until next visit to Wound care center          Dispense 30 day quantity when ordering supplies. Follow up with Osvaldo Dooley CNP in 1 week(s) in the wound care center. Call [phone] for any questions or concerns.

## 2023-10-17 ENCOUNTER — HOSPITAL ENCOUNTER (OUTPATIENT)
Dept: HYPERBARIC MEDICINE | Age: 63
Discharge: HOME | End: 2023-10-17
Payer: OTHER GOVERNMENT

## 2023-10-17 VITALS
TEMPERATURE: 97.2 F | HEART RATE: 80 BPM | SYSTOLIC BLOOD PRESSURE: 157 MMHG | DIASTOLIC BLOOD PRESSURE: 89 MMHG | RESPIRATION RATE: 16 BRPM

## 2023-10-17 DIAGNOSIS — L97.509 DIABETIC FOOT ULCER WITH OSTEOMYELITIS (HCC): Primary | ICD-10-CM

## 2023-10-17 DIAGNOSIS — E11.69 DIABETIC FOOT ULCER WITH OSTEOMYELITIS (HCC): Primary | ICD-10-CM

## 2023-10-17 DIAGNOSIS — E11.621 DIABETIC FOOT ULCER WITH OSTEOMYELITIS (HCC): Primary | ICD-10-CM

## 2023-10-17 DIAGNOSIS — M86.9 DIABETIC FOOT ULCER WITH OSTEOMYELITIS (HCC): Primary | ICD-10-CM

## 2023-10-17 LAB
GLUCOSE BLD-MCNC: 169 MG/DL (ref 70–99)
GLUCOSE BLD-MCNC: 203 MG/DL (ref 70–99)

## 2023-10-17 PROCEDURE — G0277 HBOT, FULL BODY CHAMBER, 30M: HCPCS

## 2023-10-17 PROCEDURE — 99183 HYPERBARIC OXYGEN THERAPY: CPT | Performed by: NURSE PRACTITIONER

## 2023-10-17 PROCEDURE — 82962 GLUCOSE BLOOD TEST: CPT

## 2023-10-18 ENCOUNTER — HOSPITAL ENCOUNTER (OUTPATIENT)
Dept: HYPERBARIC MEDICINE | Age: 63
Discharge: HOME | End: 2023-10-18
Payer: OTHER GOVERNMENT

## 2023-10-18 VITALS
SYSTOLIC BLOOD PRESSURE: 174 MMHG | TEMPERATURE: 97.2 F | RESPIRATION RATE: 16 BRPM | DIASTOLIC BLOOD PRESSURE: 97 MMHG | HEART RATE: 81 BPM

## 2023-10-18 DIAGNOSIS — E11.621 DIABETIC FOOT ULCER WITH OSTEOMYELITIS (HCC): Primary | ICD-10-CM

## 2023-10-18 DIAGNOSIS — M86.9 DIABETIC FOOT ULCER WITH OSTEOMYELITIS (HCC): Primary | ICD-10-CM

## 2023-10-18 DIAGNOSIS — E11.69 DIABETIC FOOT ULCER WITH OSTEOMYELITIS (HCC): Primary | ICD-10-CM

## 2023-10-18 DIAGNOSIS — L97.509 DIABETIC FOOT ULCER WITH OSTEOMYELITIS (HCC): Primary | ICD-10-CM

## 2023-10-18 LAB
GLUCOSE BLD-MCNC: 209 MG/DL (ref 70–99)
GLUCOSE BLD-MCNC: 253 MG/DL (ref 70–99)

## 2023-10-18 PROCEDURE — 99183 HYPERBARIC OXYGEN THERAPY: CPT | Performed by: NURSE PRACTITIONER

## 2023-10-18 PROCEDURE — 82962 GLUCOSE BLOOD TEST: CPT

## 2023-10-18 PROCEDURE — G0277 HBOT, FULL BODY CHAMBER, 30M: HCPCS

## 2023-10-18 ASSESSMENT — PAIN DESCRIPTION - LOCATION
LOCATION: OTHER (COMMENT)
LOCATION: OTHER (COMMENT)

## 2023-10-18 ASSESSMENT — PAIN DESCRIPTION - ONSET
ONSET: ON-GOING
ONSET: ON-GOING

## 2023-10-18 ASSESSMENT — PAIN SCALES - GENERAL
PAINLEVEL_OUTOF10: 7
PAINLEVEL_OUTOF10: 7

## 2023-10-18 ASSESSMENT — PAIN DESCRIPTION - DESCRIPTORS
DESCRIPTORS: ACHING
DESCRIPTORS: ACHING

## 2023-10-18 ASSESSMENT — PAIN DESCRIPTION - ORIENTATION: ORIENTATION: OTHER (COMMENT)

## 2023-10-18 ASSESSMENT — PAIN DESCRIPTION - PAIN TYPE
TYPE: CHRONIC PAIN
TYPE: CHRONIC PAIN

## 2023-10-18 ASSESSMENT — PAIN DESCRIPTION - FREQUENCY
FREQUENCY: CONTINUOUS
FREQUENCY: CONTINUOUS

## 2023-10-19 ENCOUNTER — HOSPITAL ENCOUNTER (OUTPATIENT)
Dept: HYPERBARIC MEDICINE | Age: 63
Discharge: HOME | End: 2023-10-19
Payer: OTHER GOVERNMENT

## 2023-10-19 ENCOUNTER — HOSPITAL ENCOUNTER (OUTPATIENT)
Dept: WOUND CARE | Age: 63
Discharge: HOME | End: 2023-10-19

## 2023-10-19 VITALS
RESPIRATION RATE: 16 BRPM | HEART RATE: 75 BPM | SYSTOLIC BLOOD PRESSURE: 141 MMHG | DIASTOLIC BLOOD PRESSURE: 79 MMHG | TEMPERATURE: 97.4 F

## 2023-10-19 DIAGNOSIS — E11.621 DIABETIC FOOT ULCER WITH OSTEOMYELITIS (HCC): Primary | ICD-10-CM

## 2023-10-19 DIAGNOSIS — E11.69 DIABETIC FOOT ULCER WITH OSTEOMYELITIS (HCC): Primary | ICD-10-CM

## 2023-10-19 DIAGNOSIS — L97.509 DIABETIC FOOT ULCER WITH OSTEOMYELITIS (HCC): Primary | ICD-10-CM

## 2023-10-19 DIAGNOSIS — M86.9 DIABETIC FOOT ULCER WITH OSTEOMYELITIS (HCC): Primary | ICD-10-CM

## 2023-10-19 PROCEDURE — G0277 HBOT, FULL BODY CHAMBER, 30M: HCPCS

## 2023-10-19 PROCEDURE — 99183 HYPERBARIC OXYGEN THERAPY: CPT | Performed by: NURSE PRACTITIONER

## 2023-10-19 NOTE — PROGRESS NOTES
[facility]. In my clinical judgement, ongoing HBO therapy is  necessary at this time, given a threat to patient function, limb or life from the current condition. Supervision and attendance of Hyperbaric Oxygen Therapy provided. Continue HBO treatment as outlined in the treatment plan. Hyperbaric Oxygen: Patiencechris Ortiz tolerated Treatment Number: 61 well today without complications.      Electronically signed by ROBIN Gupta CNP on 10/19/2023 at 5:40 PM

## 2023-10-20 ENCOUNTER — HOSPITAL ENCOUNTER (OUTPATIENT)
Dept: HYPERBARIC MEDICINE | Age: 63
Discharge: HOME | End: 2023-10-20
Payer: OTHER GOVERNMENT

## 2023-10-20 VITALS
TEMPERATURE: 97.1 F | HEART RATE: 80 BPM | SYSTOLIC BLOOD PRESSURE: 171 MMHG | DIASTOLIC BLOOD PRESSURE: 85 MMHG | RESPIRATION RATE: 16 BRPM

## 2023-10-20 DIAGNOSIS — E11.69 DIABETIC FOOT ULCER WITH OSTEOMYELITIS (HCC): Primary | ICD-10-CM

## 2023-10-20 DIAGNOSIS — M86.9 DIABETIC FOOT ULCER WITH OSTEOMYELITIS (HCC): Primary | ICD-10-CM

## 2023-10-20 DIAGNOSIS — L97.509 DIABETIC FOOT ULCER WITH OSTEOMYELITIS (HCC): Primary | ICD-10-CM

## 2023-10-20 DIAGNOSIS — E11.621 DIABETIC FOOT ULCER WITH OSTEOMYELITIS (HCC): Primary | ICD-10-CM

## 2023-10-20 PROCEDURE — G0277 HBOT, FULL BODY CHAMBER, 30M: HCPCS

## 2023-10-20 ASSESSMENT — PAIN DESCRIPTION - FREQUENCY
FREQUENCY: CONTINUOUS
FREQUENCY: CONTINUOUS

## 2023-10-20 ASSESSMENT — PAIN DESCRIPTION - ORIENTATION
ORIENTATION: LOWER
ORIENTATION: RIGHT

## 2023-10-20 ASSESSMENT — PAIN - FUNCTIONAL ASSESSMENT
PAIN_FUNCTIONAL_ASSESSMENT: PREVENTS OR INTERFERES SOME ACTIVE ACTIVITIES AND ADLS
PAIN_FUNCTIONAL_ASSESSMENT: ACTIVITIES ARE NOT PREVENTED

## 2023-10-20 ASSESSMENT — PAIN DESCRIPTION - LOCATION
LOCATION: BACK
LOCATION: ANKLE

## 2023-10-20 ASSESSMENT — PAIN DESCRIPTION - DESCRIPTORS
DESCRIPTORS: OTHER (COMMENT)
DESCRIPTORS: THROBBING

## 2023-10-20 ASSESSMENT — PAIN DESCRIPTION - PAIN TYPE
TYPE: ACUTE PAIN
TYPE: ACUTE PAIN

## 2023-10-20 ASSESSMENT — PAIN SCALES - GENERAL
PAINLEVEL_OUTOF10: 3
PAINLEVEL_OUTOF10: 5

## 2023-10-20 ASSESSMENT — PAIN DESCRIPTION - ONSET
ONSET: ON-GOING
ONSET: ON-GOING

## 2023-10-21 NOTE — PLAN OF CARE
OPERATIVE REPORT 10/21/23    PREOPERATIVE DIAGNOSIS: Right ureteral stone(s)    POSTOPERATIVE DIAGNOSIS:  Same    PROCEDURES PERFORMED:   1. Cystourethroscopy with right retrograde pyelography  2. Placement of right ureteral stent.  3. Intraoperative interpretation of fluoroscopic imaging.     STAFF SURGEON: Harjit Marinelli MD  RESIDENT: Jami Perez MD    ANESTHESIA: Choice    ESTIMATED BLOOD LOSS: 0 mL.     DRAINS:  6 Fr x 30 cm double J RIGHT ureteral stent.  Ureter measured to get this length.    OPERATIVE INDICATIONS:   Jodi Biswas is a 35 year old female who presented with a right obstructing ureteral stone in setting of fever. The patient was counseled on the alternatives, risks, and benefits and elected to proceed with the above stated procedure.    DESCRIPTION OF PROCEDURE:    After informed consent was obtained, the patient was taken to the operating room, and moved to the operating table.  After adequate anesthesia was induced, the patient was repositioned in dorsal lithotomy position and prepped and draped in the usual sterile fashion. A timeout was taken to confirm correct patient, procedure and laterality.     A 22-Kosovan cystoscope was inserted into a well lubricated urethra. The urethra was unremarkable.  The bladder was free of tumors, stones or diverticuli.  The media was clear.  Bilateral ureteral orifices were orthotopic.  A Sensor guidewire was advanced into the right renal pelvis with the aid of a 5-Kosovan open ended ureteral catheter.  A retrograde pyelogram demonstrated mild hydronephrosis with no filling defects.  The stone appeared barely visible on fluoro in the proximal ureter.  The wire was replaced and a 6Fr x 30 cm double J stent was advanced over the guidewire under fluoroscopic guidance with a good curl in the right renal pelvis and bladder.  The stent passed up easily with no appreciable resistance.  The bladder was then drained.    The patient tolerated the procedure  Problem: Pain  Goal: Verbalizes/displays adequate comfort level or baseline comfort level  Outcome: Progressing     Problem: Wound:  Goal: Will show signs of wound healing; wound closure and no evidence of infection  Description: Will show signs of wound healing; wound closure and no evidence of infection  Outcome: Progressing well.  There were no complications.       PLAN:   - Discharge when meeting PACU criteria  - Will arrange follow up for definitive stone management in next 2-3 weeks    I was present and scrubbed for the entire procedure.  Harjit Marinelli MD  Urology Staff

## 2023-10-23 ENCOUNTER — HOSPITAL ENCOUNTER (OUTPATIENT)
Dept: WOUND CARE | Age: 63
Discharge: HOME | End: 2023-10-23
Payer: OTHER GOVERNMENT

## 2023-10-23 ENCOUNTER — HOSPITAL ENCOUNTER (OUTPATIENT)
Dept: HYPERBARIC MEDICINE | Age: 63
Discharge: HOME | End: 2023-10-23
Payer: OTHER GOVERNMENT

## 2023-10-23 VITALS
DIASTOLIC BLOOD PRESSURE: 80 MMHG | TEMPERATURE: 97.5 F | HEART RATE: 80 BPM | SYSTOLIC BLOOD PRESSURE: 175 MMHG | RESPIRATION RATE: 20 BRPM

## 2023-10-23 DIAGNOSIS — M86.9 DIABETIC FOOT ULCER WITH OSTEOMYELITIS (HCC): ICD-10-CM

## 2023-10-23 DIAGNOSIS — L97.509 DIABETIC FOOT ULCER WITH OSTEOMYELITIS (HCC): ICD-10-CM

## 2023-10-23 DIAGNOSIS — L97.413 DIABETIC ULCER OF RIGHT MIDFOOT ASSOCIATED WITH TYPE 2 DIABETES MELLITUS, WITH NECROSIS OF MUSCLE (HCC): Primary | ICD-10-CM

## 2023-10-23 DIAGNOSIS — M86.9 DIABETIC FOOT ULCER WITH OSTEOMYELITIS (HCC): Primary | ICD-10-CM

## 2023-10-23 DIAGNOSIS — I73.9 PAD (PERIPHERAL ARTERY DISEASE) (HCC): ICD-10-CM

## 2023-10-23 DIAGNOSIS — E11.69 DIABETIC FOOT ULCER WITH OSTEOMYELITIS (HCC): ICD-10-CM

## 2023-10-23 DIAGNOSIS — E11.621 DIABETIC FOOT ULCER WITH OSTEOMYELITIS (HCC): Primary | ICD-10-CM

## 2023-10-23 DIAGNOSIS — E11.621 DIABETIC FOOT ULCER WITH OSTEOMYELITIS (HCC): ICD-10-CM

## 2023-10-23 DIAGNOSIS — E11.69 DIABETIC FOOT ULCER WITH OSTEOMYELITIS (HCC): Primary | ICD-10-CM

## 2023-10-23 DIAGNOSIS — L97.509 DIABETIC FOOT ULCER WITH OSTEOMYELITIS (HCC): Primary | ICD-10-CM

## 2023-10-23 DIAGNOSIS — Z79.4 DIABETES MELLITUS TREATED WITH INSULIN (HCC): ICD-10-CM

## 2023-10-23 DIAGNOSIS — E11.9 DIABETES MELLITUS TREATED WITH INSULIN (HCC): ICD-10-CM

## 2023-10-23 DIAGNOSIS — E11.621 DIABETIC ULCER OF RIGHT MIDFOOT ASSOCIATED WITH TYPE 2 DIABETES MELLITUS, WITH NECROSIS OF MUSCLE (HCC): Primary | ICD-10-CM

## 2023-10-23 PROCEDURE — 11045 DBRDMT SUBQ TISS EACH ADDL: CPT | Performed by: NURSE PRACTITIONER

## 2023-10-23 PROCEDURE — 11042 DBRDMT SUBQ TIS 1ST 20SQCM/<: CPT | Performed by: NURSE PRACTITIONER

## 2023-10-23 PROCEDURE — 11042 DBRDMT SUBQ TIS 1ST 20SQCM/<: CPT

## 2023-10-23 PROCEDURE — 11045 DBRDMT SUBQ TISS EACH ADDL: CPT

## 2023-10-23 PROCEDURE — 99183 HYPERBARIC OXYGEN THERAPY: CPT | Performed by: NURSE PRACTITIONER

## 2023-10-23 PROCEDURE — G0277 HBOT, FULL BODY CHAMBER, 30M: HCPCS

## 2023-10-23 RX ORDER — BACITRACIN ZINC AND POLYMYXIN B SULFATE 500; 1000 [USP'U]/G; [USP'U]/G
OINTMENT TOPICAL ONCE
OUTPATIENT
Start: 2023-10-23 | End: 2023-10-23

## 2023-10-23 RX ORDER — BETAMETHASONE DIPROPIONATE 0.05 %
OINTMENT (GRAM) TOPICAL ONCE
OUTPATIENT
Start: 2023-10-23 | End: 2023-10-23

## 2023-10-23 RX ORDER — CLOBETASOL PROPIONATE 0.5 MG/G
OINTMENT TOPICAL ONCE
OUTPATIENT
Start: 2023-10-23 | End: 2023-10-23

## 2023-10-23 RX ORDER — IBUPROFEN 200 MG
TABLET ORAL ONCE
OUTPATIENT
Start: 2023-10-23 | End: 2023-10-23

## 2023-10-23 RX ORDER — SODIUM CHLOR/HYPOCHLOROUS ACID 0.033 %
SOLUTION, IRRIGATION IRRIGATION ONCE
OUTPATIENT
Start: 2023-10-23 | End: 2023-10-23

## 2023-10-23 RX ORDER — LIDOCAINE 40 MG/G
CREAM TOPICAL ONCE
OUTPATIENT
Start: 2023-10-23 | End: 2023-10-23

## 2023-10-23 RX ORDER — LIDOCAINE HYDROCHLORIDE 40 MG/ML
SOLUTION TOPICAL ONCE
OUTPATIENT
Start: 2023-10-23 | End: 2023-10-23

## 2023-10-23 RX ORDER — GENTAMICIN SULFATE 1 MG/G
OINTMENT TOPICAL ONCE
OUTPATIENT
Start: 2023-10-23 | End: 2023-10-23

## 2023-10-23 RX ORDER — LIDOCAINE 50 MG/G
OINTMENT TOPICAL ONCE
OUTPATIENT
Start: 2023-10-23 | End: 2023-10-23

## 2023-10-23 RX ORDER — TRIAMCINOLONE ACETONIDE 1 MG/G
OINTMENT TOPICAL ONCE
OUTPATIENT
Start: 2023-10-23 | End: 2023-10-23

## 2023-10-23 RX ORDER — GINSENG 100 MG
CAPSULE ORAL ONCE
OUTPATIENT
Start: 2023-10-23 | End: 2023-10-23

## 2023-10-23 ASSESSMENT — PAIN DESCRIPTION - ORIENTATION: ORIENTATION: RIGHT

## 2023-10-23 ASSESSMENT — PAIN SCALES - GENERAL: PAINLEVEL_OUTOF10: 10

## 2023-10-23 ASSESSMENT — PAIN DESCRIPTION - PAIN TYPE: TYPE: CHRONIC PAIN

## 2023-10-23 ASSESSMENT — PAIN DESCRIPTION - DESCRIPTORS: DESCRIPTORS: THROBBING

## 2023-10-23 ASSESSMENT — PAIN DESCRIPTION - FREQUENCY: FREQUENCY: CONTINUOUS

## 2023-10-23 ASSESSMENT — PAIN DESCRIPTION - LOCATION: LOCATION: OTHER (COMMENT)

## 2023-10-23 ASSESSMENT — PAIN - FUNCTIONAL ASSESSMENT: PAIN_FUNCTIONAL_ASSESSMENT: PREVENTS OR INTERFERES SOME ACTIVE ACTIVITIES AND ADLS

## 2023-10-23 ASSESSMENT — PAIN DESCRIPTION - ONSET: ONSET: ON-GOING

## 2023-10-23 NOTE — PATIENT INSTRUCTIONS
PHYSICIAN ORDERS AND DISCHARGE INSTRUCTIONS     NOTE: Upon discharge from the  Medical [facility], you will receive a patient experience survey. We would be grateful if you would take the time to fill this survey out. Wound cleansing:               Do not scrub or use excessive force. Wash hands with soap and water before and after dressing changes. Prior to applying a clean dressing, cleanse wound with normal saline, wound cleanser, or mild soap and water. Ask the physician or nurse before getting the wound(s) wet in a shower. General Wound management:              Keep weight off wounds and reposition every 2 hours. Avoid standing for long periods of time. Apply wraps/stockings in AM and remove at bedtime. If swelling is present, elevate legs to the level of the heart or above for 30 minutes 4-5 times a day and/or when sitting. When taking antibiotics take entire prescription as ordered by physician do not stop taking until medicine is all gone. Increase protein intake to promote healing. Wound Care Notes:                                Orders for this week: 10/23/23      [facility]X COMPLETED [facility]: Fax # [phone]     Right Plantar Foot Wound - Wash with soap and water, pat dry. Apply Santyl, Stimulen and Sorbact cut and layer to fit and fill wound till flush with periwound. Cover with 2 sorbex , cut slit in the first and layer the second on top (secured over wound with medipore tape)   Wrap with conform. (Enclose toes with conform and wrap around ankle). Secure with tape   Wrap with Ace. Change Monday and Thursdays   Leave in place until next visit to Wound care center          Dispense 30 day quantity when ordering supplies. Follow up with Kristen Nixon CNP in 1 week(s) in the wound care center. Call [phone] for any questions or concerns. Statement Selected

## 2023-10-23 NOTE — PLAN OF CARE
Patient tolerated treatment well; rested with eyes closed at times during Treatment without signs of acute distress noted.

## 2023-10-24 ENCOUNTER — HOSPITAL ENCOUNTER (OUTPATIENT)
Dept: HYPERBARIC MEDICINE | Age: 63
Discharge: HOME | End: 2023-10-24
Payer: OTHER GOVERNMENT

## 2023-10-24 VITALS
DIASTOLIC BLOOD PRESSURE: 80 MMHG | RESPIRATION RATE: 16 BRPM | TEMPERATURE: 97.3 F | SYSTOLIC BLOOD PRESSURE: 175 MMHG | HEART RATE: 75 BPM

## 2023-10-24 DIAGNOSIS — Z79.4 DIABETES MELLITUS TREATED WITH INSULIN (HCC): ICD-10-CM

## 2023-10-24 DIAGNOSIS — E11.9 DIABETES MELLITUS TREATED WITH INSULIN (HCC): ICD-10-CM

## 2023-10-24 DIAGNOSIS — E11.69 DIABETIC FOOT ULCER WITH OSTEOMYELITIS (HCC): Primary | ICD-10-CM

## 2023-10-24 DIAGNOSIS — E11.621 DIABETIC ULCER OF RIGHT MIDFOOT ASSOCIATED WITH TYPE 2 DIABETES MELLITUS, WITH NECROSIS OF MUSCLE (HCC): ICD-10-CM

## 2023-10-24 DIAGNOSIS — I73.9 PAD (PERIPHERAL ARTERY DISEASE) (HCC): ICD-10-CM

## 2023-10-24 DIAGNOSIS — M86.9 DIABETIC FOOT ULCER WITH OSTEOMYELITIS (HCC): Primary | ICD-10-CM

## 2023-10-24 DIAGNOSIS — L97.413 DIABETIC ULCER OF RIGHT MIDFOOT ASSOCIATED WITH TYPE 2 DIABETES MELLITUS, WITH NECROSIS OF MUSCLE (HCC): ICD-10-CM

## 2023-10-24 DIAGNOSIS — L97.509 DIABETIC FOOT ULCER WITH OSTEOMYELITIS (HCC): Primary | ICD-10-CM

## 2023-10-24 DIAGNOSIS — E11.621 DIABETIC FOOT ULCER WITH OSTEOMYELITIS (HCC): Primary | ICD-10-CM

## 2023-10-24 PROCEDURE — G0277 HBOT, FULL BODY CHAMBER, 30M: HCPCS

## 2023-10-24 PROCEDURE — 99183 HYPERBARIC OXYGEN THERAPY: CPT | Performed by: NURSE PRACTITIONER

## 2023-10-24 NOTE — PROGRESS NOTES
fibrin, biofilm, slough, necrotic/eschar, and exudate    Pre Debridement Measurements:  Are located in the Wound Documentation Flow Sheet    All active wounds listed below with today's date are evaluated  Wound(s) debrided this date include # : 1     Post  Debridement Measurements:  Wound 09/07/23 Right;Plantar #1 Right Plantar Foot (Active)   Wound Image   10/16/23 1510   Wound Etiology Diabetic 10/23/23 1055   Dressing Status New dressing applied 10/23/23 1059   Wound Cleansed Soap and water; Wound cleanser 10/23/23 1055   Dressing/Treatment Other (comment) 10/23/23 1059   Offloading for Diabetic Foot Ulcers Post op shoe 10/23/23 1055   Wound Length (cm) 9 cm 10/23/23 1055   Wound Width (cm) 7.5 cm 10/23/23 1055   Wound Depth (cm) 0.4 cm 10/23/23 1055   Wound Surface Area (cm^2) 67.5 cm^2 10/23/23 1055   Change in Wound Size % (l*w) -13.2 10/23/23 1055   Wound Volume (cm^3) 27 cm^3 10/23/23 1055   Wound Healing % 25 10/23/23 1055   Post-Procedure Length (cm) 9 cm 10/23/23 1059   Post-Procedure Width (cm) 7.5 cm 10/23/23 1059   Post-Procedure Depth (cm) 0.4 cm 10/23/23 1059   Post-Procedure Surface Area (cm^2) 67.5 cm^2 10/23/23 1059   Post-Procedure Volume (cm^3) 27 cm^3 10/23/23 1059   Distance Tunneling (cm) 0 cm 10/23/23 1055   Tunneling Position ___ O'Clock 0 10/23/23 1055   Undermining Starts ___ O'Clock 3 10/23/23 1055   Undermining Ends___ O'Clock 6 10/23/23 1055   Undermining Maxium Distance (cm) 0.3 10/23/23 1055   Wound Assessment Pink/red;Slough; Devitalized tissue 10/23/23 1055   Drainage Amount Large (50-75% saturated) 10/23/23 1055   Drainage Description Serosanguinous 10/23/23 1055   Odor Mild 10/23/23 1055   Brittni-wound Assessment Maceration 10/23/23 1055   Margins Defined edges 10/23/23 1055   Wound Thickness Description not for Pressure Injury Full thickness 10/23/23 1055   Number of days: 47     Total  Area  Debrided: 65 sq cm     Bleeding:  Minimal    Hemostasis Achieved:  by

## 2023-10-26 ENCOUNTER — HOSPITAL ENCOUNTER (OUTPATIENT)
Dept: HYPERBARIC MEDICINE | Age: 63
Discharge: HOME | End: 2023-10-26
Payer: OTHER GOVERNMENT

## 2023-10-26 ENCOUNTER — HOSPITAL ENCOUNTER (OUTPATIENT)
Dept: WOUND CARE | Age: 63
Discharge: HOME | End: 2023-10-26
Payer: OTHER GOVERNMENT

## 2023-10-26 VITALS
RESPIRATION RATE: 20 BRPM | HEART RATE: 70 BPM | SYSTOLIC BLOOD PRESSURE: 145 MMHG | TEMPERATURE: 97.5 F | DIASTOLIC BLOOD PRESSURE: 80 MMHG

## 2023-10-26 DIAGNOSIS — L97.509 DIABETIC FOOT ULCER WITH OSTEOMYELITIS (HCC): Primary | ICD-10-CM

## 2023-10-26 DIAGNOSIS — E11.69 DIABETIC FOOT ULCER WITH OSTEOMYELITIS (HCC): Primary | ICD-10-CM

## 2023-10-26 DIAGNOSIS — E11.621 DIABETIC FOOT ULCER WITH OSTEOMYELITIS (HCC): Primary | ICD-10-CM

## 2023-10-26 DIAGNOSIS — M86.9 DIABETIC FOOT ULCER WITH OSTEOMYELITIS (HCC): Primary | ICD-10-CM

## 2023-10-26 PROCEDURE — 97602 WOUND(S) CARE NON-SELECTIVE: CPT

## 2023-10-26 PROCEDURE — G0277 HBOT, FULL BODY CHAMBER, 30M: HCPCS

## 2023-10-26 NOTE — PROGRESS NOTES
Nonselective enzymatic debridement performed with Santyl per physician order to wound(s) of the Right foot wound  Patient tolerated the procedure well.

## 2023-10-27 ENCOUNTER — HOSPITAL ENCOUNTER (OUTPATIENT)
Dept: HYPERBARIC MEDICINE | Age: 63
Discharge: HOME | End: 2023-10-27
Payer: OTHER GOVERNMENT

## 2023-10-27 VITALS
SYSTOLIC BLOOD PRESSURE: 180 MMHG | RESPIRATION RATE: 16 BRPM | HEART RATE: 74 BPM | TEMPERATURE: 97.1 F | DIASTOLIC BLOOD PRESSURE: 78 MMHG

## 2023-10-27 DIAGNOSIS — M86.9 DIABETIC FOOT ULCER WITH OSTEOMYELITIS (HCC): Primary | ICD-10-CM

## 2023-10-27 DIAGNOSIS — E11.69 DIABETIC FOOT ULCER WITH OSTEOMYELITIS (HCC): Primary | ICD-10-CM

## 2023-10-27 DIAGNOSIS — L97.509 DIABETIC FOOT ULCER WITH OSTEOMYELITIS (HCC): Primary | ICD-10-CM

## 2023-10-27 DIAGNOSIS — E11.621 DIABETIC FOOT ULCER WITH OSTEOMYELITIS (HCC): Primary | ICD-10-CM

## 2023-10-27 PROCEDURE — G0277 HBOT, FULL BODY CHAMBER, 30M: HCPCS

## 2023-10-30 ENCOUNTER — HOSPITAL ENCOUNTER (OUTPATIENT)
Dept: HYPERBARIC MEDICINE | Age: 63
Discharge: HOME | End: 2023-10-30
Payer: OTHER GOVERNMENT

## 2023-10-30 ENCOUNTER — HOSPITAL ENCOUNTER (OUTPATIENT)
Dept: WOUND CARE | Age: 63
Discharge: HOME | End: 2023-10-30
Payer: OTHER GOVERNMENT

## 2023-10-30 VITALS
HEART RATE: 73 BPM | TEMPERATURE: 97.3 F | SYSTOLIC BLOOD PRESSURE: 181 MMHG | DIASTOLIC BLOOD PRESSURE: 81 MMHG | RESPIRATION RATE: 16 BRPM

## 2023-10-30 DIAGNOSIS — M86.9 DIABETIC FOOT ULCER WITH OSTEOMYELITIS (HCC): ICD-10-CM

## 2023-10-30 DIAGNOSIS — I73.9 PAD (PERIPHERAL ARTERY DISEASE) (HCC): ICD-10-CM

## 2023-10-30 DIAGNOSIS — E11.9 DIABETES MELLITUS TREATED WITH INSULIN (HCC): ICD-10-CM

## 2023-10-30 DIAGNOSIS — E11.69 DIABETIC FOOT ULCER WITH OSTEOMYELITIS (HCC): ICD-10-CM

## 2023-10-30 DIAGNOSIS — L97.509 DIABETIC FOOT ULCER WITH OSTEOMYELITIS (HCC): ICD-10-CM

## 2023-10-30 DIAGNOSIS — M86.9 DIABETIC FOOT ULCER WITH OSTEOMYELITIS (HCC): Primary | ICD-10-CM

## 2023-10-30 DIAGNOSIS — L97.413 DIABETIC ULCER OF RIGHT MIDFOOT ASSOCIATED WITH TYPE 2 DIABETES MELLITUS, WITH NECROSIS OF MUSCLE (HCC): Primary | ICD-10-CM

## 2023-10-30 DIAGNOSIS — E11.69 DIABETIC FOOT ULCER WITH OSTEOMYELITIS (HCC): Primary | ICD-10-CM

## 2023-10-30 DIAGNOSIS — E11.621 DIABETIC ULCER OF RIGHT MIDFOOT ASSOCIATED WITH TYPE 2 DIABETES MELLITUS, WITH NECROSIS OF MUSCLE (HCC): Primary | ICD-10-CM

## 2023-10-30 DIAGNOSIS — L97.509 DIABETIC FOOT ULCER WITH OSTEOMYELITIS (HCC): Primary | ICD-10-CM

## 2023-10-30 DIAGNOSIS — E11.621 DIABETIC FOOT ULCER WITH OSTEOMYELITIS (HCC): ICD-10-CM

## 2023-10-30 DIAGNOSIS — Z79.4 DIABETES MELLITUS TREATED WITH INSULIN (HCC): ICD-10-CM

## 2023-10-30 DIAGNOSIS — E11.621 DIABETIC FOOT ULCER WITH OSTEOMYELITIS (HCC): Primary | ICD-10-CM

## 2023-10-30 PROCEDURE — 15275 SKIN SUB GRAFT FACE/NK/HF/G: CPT

## 2023-10-30 PROCEDURE — 99183 HYPERBARIC OXYGEN THERAPY: CPT | Performed by: NURSE PRACTITIONER

## 2023-10-30 PROCEDURE — 11045 DBRDMT SUBQ TISS EACH ADDL: CPT

## 2023-10-30 PROCEDURE — 15275 SKIN SUB GRAFT FACE/NK/HF/G: CPT | Performed by: NURSE PRACTITIONER

## 2023-10-30 PROCEDURE — G0277 HBOT, FULL BODY CHAMBER, 30M: HCPCS

## 2023-10-30 PROCEDURE — 11042 DBRDMT SUBQ TIS 1ST 20SQCM/<: CPT

## 2023-10-30 RX ORDER — LIDOCAINE 50 MG/G
OINTMENT TOPICAL ONCE
OUTPATIENT
Start: 2023-10-30 | End: 2023-10-30

## 2023-10-30 RX ORDER — IBUPROFEN 200 MG
TABLET ORAL ONCE
OUTPATIENT
Start: 2023-10-30 | End: 2023-10-30

## 2023-10-30 RX ORDER — BETAMETHASONE DIPROPIONATE 0.05 %
OINTMENT (GRAM) TOPICAL ONCE
OUTPATIENT
Start: 2023-10-30 | End: 2023-10-30

## 2023-10-30 RX ORDER — SODIUM CHLOR/HYPOCHLOROUS ACID 0.033 %
SOLUTION, IRRIGATION IRRIGATION ONCE
OUTPATIENT
Start: 2023-10-30 | End: 2023-10-30

## 2023-10-30 RX ORDER — TRIAMCINOLONE ACETONIDE 1 MG/G
OINTMENT TOPICAL ONCE
OUTPATIENT
Start: 2023-10-30 | End: 2023-10-30

## 2023-10-30 RX ORDER — LIDOCAINE 40 MG/G
CREAM TOPICAL ONCE
OUTPATIENT
Start: 2023-10-30 | End: 2023-10-30

## 2023-10-30 RX ORDER — LIDOCAINE HYDROCHLORIDE 40 MG/ML
SOLUTION TOPICAL ONCE
OUTPATIENT
Start: 2023-10-30 | End: 2023-10-30

## 2023-10-30 RX ORDER — GINSENG 100 MG
CAPSULE ORAL ONCE
OUTPATIENT
Start: 2023-10-30 | End: 2023-10-30

## 2023-10-30 RX ORDER — GENTAMICIN SULFATE 1 MG/G
OINTMENT TOPICAL ONCE
OUTPATIENT
Start: 2023-10-30 | End: 2023-10-30

## 2023-10-30 RX ORDER — BACITRACIN ZINC AND POLYMYXIN B SULFATE 500; 1000 [USP'U]/G; [USP'U]/G
OINTMENT TOPICAL ONCE
OUTPATIENT
Start: 2023-10-30 | End: 2023-10-30

## 2023-10-30 RX ORDER — CLOBETASOL PROPIONATE 0.5 MG/G
OINTMENT TOPICAL ONCE
OUTPATIENT
Start: 2023-10-30 | End: 2023-10-30

## 2023-10-30 NOTE — PATIENT INSTRUCTIONS
PHYSICIAN ORDERS AND DISCHARGE INSTRUCTIONS     NOTE: Upon discharge from the  Medical [facility], you will receive a patient experience survey. We would be grateful if you would take the time to fill this survey out. Wound cleansing:               Do not scrub or use excessive force. Wash hands with soap and water before and after dressing changes. Prior to applying a clean dressing, cleanse wound with normal saline, wound cleanser, or mild soap and water. Ask the physician or nurse before getting the wound(s) wet in a shower. General Wound management:              Keep weight off wounds and reposition every 2 hours. Avoid standing for long periods of time. Apply wraps/stockings in AM and remove at bedtime. If swelling is present, elevate legs to the level of the heart or above for 30 minutes 4-5 times a day and/or when sitting. When taking antibiotics take entire prescription as ordered by physician do not stop taking until medicine is all gone. Increase protein intake to promote healing. Wound Care Notes:   Applied for Kerecis grafts     Oleta Anon #1 applied to right plantar foot 10/30/23                               Orders for this week: 10/30/23      [facility]X COMPLETED [facility]: Fax # [phone]     Right Plantar Foot Wound - Wash with soap and water, pat dry. Kerecis #1 applied to Right Plantar Foot, covered with Sorbact and secured with steri strips (make sure these layers stay in place for 1 week). Cover with ca alginate and 2 sorbex, cut slit in the first and layer the second on top (secured over wound with medipore tape)   Wrap with conform (Enclose toes with conform and wrap around ankle). Secure with tape. Wrap with Ace. Change top layers only on Thursday, GRAFT NEEDS TO STAY IN PLACE UNTIL NEXT MONDAY! Dispense 30 day quantity when ordering supplies.      Follow up with Glenda Lares CNP in 1

## 2023-10-30 NOTE — PROGRESS NOTES
Kerecis Omega3 Treatment Note    NAME:  Jesse Deleon  YOB: 1960  MEDICAL RECORD NUMBER:  [id number]  DATE:  10/30/2023    Goal:  Patient will receive safe and proper application of skin substitute. Patient will comply with caring for dressing, and reporting complications. The expiration date is checked immediately before use., The package was intact prior to use and no damage was noted., Lollie Glee is stored at room temperature.,  Lollie Glee was hydrated with sterile normal saline per the provider.,  Lollie Glee was removed from protective sterile packaging by the provider and applied to the prepared ulcer bed. ,  Lollie Glee was applied to Right Plantar Foot  , Keresis Omega 3 and affixed with steri-strips by the provider.,  Lollie Glee was covered with non-adherent ulcer dressing. , Applied ca alginate, 2 Sorbex, conform, ACE over non-adherent., Applied dry gauze and/or roll gauze. , Applied appropriate off-loading device. , The patient/caregiver was instructed not to remove the dressing and to keep it clean and dry., The patient/family/caregiver was instructed on the need for offloading and elevation of the affected extremity and on using the prescribed offloading device. , and The patient/family/caregiver was instructed on signs and symptoms of complication to report, such as draining through dressing, dressing falling down/slipping, getting wet, or severe pain or tingling.  Guidelines followed  Larena Creamer may only be used every 12 months per wound. Date of first application of Kerecis Omega 3 for this current wound is October 30, 2023. Application # 1 out of 10  Sample Kerecis 3x7 Fenestrated graft also applied this date.     Electronically signed by Lorena Bean RN on 10/30/2023 at 11:27 AM
Avoid standing for long periods of time. Apply wraps/stockings in AM and remove at bedtime. If swelling is present, elevate legs to the level of the heart or above for 30 minutes 4-5 times a day and/or when sitting. When taking antibiotics take entire prescription as ordered by physician do not stop taking until medicine is all gone. Increase protein intake to promote healing. Wound Care Notes:   Applied for Kerecis grafts     Leslye Lush #1 applied to right plantar foot 10/30/23                               Orders for this week: 10/30/23      FAX COMPLETED WEEK[facility]IN[facility]ES TO [facility]: Fax # [phone]     Right Plantar Foot Wound - Wash with soap and water, pat dry. Kerecis #1 applied to Right Plantar Foot, covered with Sorbact and secured with steri strips (make sure these layers stay in place for 1 week). Cover with ca alginate and 2 sorbex, cut slit in the first and layer the second on top (secured over wound with medipore tape)   Wrap with conform (Enclose toes with conform and wrap around ankle). Secure with tape. Wrap with Ace. Change top layers only on Thursday, GRAFT NEEDS TO STAY IN PLACE UNTIL NEXT MONDAY! Dispense 30 day quantity when ordering supplies. Follow up with Samantha Chow CNP in 1 week(s) in the wound care center. Call [phone] for any questions or concerns.      Electronically signed by ROBIN Cage CNP on 10/30/2023 at 11:43 AM

## 2023-11-01 ENCOUNTER — HOSPITAL ENCOUNTER (OUTPATIENT)
Dept: HYPERBARIC MEDICINE | Age: 63
Discharge: HOME | End: 2023-11-01
Payer: OTHER GOVERNMENT

## 2023-11-01 VITALS
TEMPERATURE: 97.4 F | HEART RATE: 76 BPM | DIASTOLIC BLOOD PRESSURE: 78 MMHG | RESPIRATION RATE: 16 BRPM | SYSTOLIC BLOOD PRESSURE: 157 MMHG

## 2023-11-01 DIAGNOSIS — E11.621 DIABETIC FOOT ULCER WITH OSTEOMYELITIS (HCC): Primary | ICD-10-CM

## 2023-11-01 DIAGNOSIS — E11.69 DIABETIC FOOT ULCER WITH OSTEOMYELITIS (HCC): Primary | ICD-10-CM

## 2023-11-01 DIAGNOSIS — M86.9 DIABETIC FOOT ULCER WITH OSTEOMYELITIS (HCC): Primary | ICD-10-CM

## 2023-11-01 DIAGNOSIS — L97.509 DIABETIC FOOT ULCER WITH OSTEOMYELITIS (HCC): Primary | ICD-10-CM

## 2023-11-01 PROCEDURE — G0277 HBOT, FULL BODY CHAMBER, 30M: HCPCS

## 2023-11-01 ASSESSMENT — PAIN DESCRIPTION - PAIN TYPE: TYPE: CHRONIC PAIN

## 2023-11-01 ASSESSMENT — PAIN - FUNCTIONAL ASSESSMENT: PAIN_FUNCTIONAL_ASSESSMENT: PREVENTS OR INTERFERES SOME ACTIVE ACTIVITIES AND ADLS

## 2023-11-01 ASSESSMENT — PAIN DESCRIPTION - ONSET: ONSET: ON-GOING

## 2023-11-01 ASSESSMENT — PAIN DESCRIPTION - FREQUENCY: FREQUENCY: CONTINUOUS

## 2023-11-01 ASSESSMENT — PAIN DESCRIPTION - DESCRIPTORS: DESCRIPTORS: THROBBING

## 2023-11-01 ASSESSMENT — PAIN DESCRIPTION - LOCATION: LOCATION: FOOT

## 2023-11-01 ASSESSMENT — PAIN DESCRIPTION - ORIENTATION: ORIENTATION: RIGHT

## 2023-11-01 ASSESSMENT — PAIN SCALES - GENERAL: PAINLEVEL_OUTOF10: 2

## 2023-11-02 ENCOUNTER — HOSPITAL ENCOUNTER (OUTPATIENT)
Dept: WOUND CARE | Age: 63
Discharge: HOME | End: 2023-11-02
Payer: OTHER GOVERNMENT

## 2023-11-02 ENCOUNTER — HOSPITAL ENCOUNTER (OUTPATIENT)
Dept: HYPERBARIC MEDICINE | Age: 63
Discharge: HOME | End: 2023-11-02
Payer: OTHER GOVERNMENT

## 2023-11-02 VITALS
HEART RATE: 76 BPM | TEMPERATURE: 97.2 F | RESPIRATION RATE: 16 BRPM | DIASTOLIC BLOOD PRESSURE: 70 MMHG | SYSTOLIC BLOOD PRESSURE: 180 MMHG

## 2023-11-02 DIAGNOSIS — L97.509 DIABETIC FOOT ULCER WITH OSTEOMYELITIS (HCC): Primary | ICD-10-CM

## 2023-11-02 DIAGNOSIS — E11.621 DIABETIC FOOT ULCER WITH OSTEOMYELITIS (HCC): Primary | ICD-10-CM

## 2023-11-02 DIAGNOSIS — E11.69 DIABETIC FOOT ULCER WITH OSTEOMYELITIS (HCC): Primary | ICD-10-CM

## 2023-11-02 DIAGNOSIS — M86.9 DIABETIC FOOT ULCER WITH OSTEOMYELITIS (HCC): Primary | ICD-10-CM

## 2023-11-02 PROCEDURE — 99213 OFFICE O/P EST LOW 20 MIN: CPT

## 2023-11-02 PROCEDURE — G0277 HBOT, FULL BODY CHAMBER, 30M: HCPCS

## 2023-11-02 ASSESSMENT — PAIN DESCRIPTION - ORIENTATION: ORIENTATION: LEFT;RIGHT

## 2023-11-02 ASSESSMENT — PAIN DESCRIPTION - LOCATION: LOCATION: HAND

## 2023-11-02 ASSESSMENT — PAIN DESCRIPTION - PAIN TYPE: TYPE: CHRONIC PAIN

## 2023-11-02 ASSESSMENT — PAIN SCALES - GENERAL: PAINLEVEL_OUTOF10: 8

## 2023-11-02 ASSESSMENT — PAIN DESCRIPTION - ONSET: ONSET: ON-GOING

## 2023-11-02 ASSESSMENT — PAIN DESCRIPTION - DESCRIPTORS: DESCRIPTORS: BURNING;TINGLING;TIGHTNESS

## 2023-11-02 ASSESSMENT — PAIN - FUNCTIONAL ASSESSMENT: PAIN_FUNCTIONAL_ASSESSMENT: PREVENTS OR INTERFERES SOME ACTIVE ACTIVITIES AND ADLS

## 2023-11-02 ASSESSMENT — PAIN DESCRIPTION - FREQUENCY: FREQUENCY: CONTINUOUS

## 2023-11-02 NOTE — PROGRESS NOTES
Patient at the Wound Care center for Hyperbaric Oxygen Therapy treatment today and nurse visit for dressing change of outer layers. Old outer dressing layers removed , applied new calcium alginate, Sorbex absorbers x 2 with slit in first layer , tape, conform, Ace wrap , tape. Patient tolerated well.

## 2023-11-03 ENCOUNTER — HOSPITAL ENCOUNTER (OUTPATIENT)
Dept: HYPERBARIC MEDICINE | Age: 63
Discharge: HOME | End: 2023-11-03
Payer: OTHER GOVERNMENT

## 2023-11-03 VITALS
SYSTOLIC BLOOD PRESSURE: 151 MMHG | HEART RATE: 78 BPM | DIASTOLIC BLOOD PRESSURE: 73 MMHG | TEMPERATURE: 97.5 F | RESPIRATION RATE: 16 BRPM

## 2023-11-03 DIAGNOSIS — E11.621 DIABETIC FOOT ULCER WITH OSTEOMYELITIS (HCC): Primary | ICD-10-CM

## 2023-11-03 DIAGNOSIS — L97.509 DIABETIC FOOT ULCER WITH OSTEOMYELITIS (HCC): Primary | ICD-10-CM

## 2023-11-03 DIAGNOSIS — E11.69 DIABETIC FOOT ULCER WITH OSTEOMYELITIS (HCC): Primary | ICD-10-CM

## 2023-11-03 DIAGNOSIS — M86.9 DIABETIC FOOT ULCER WITH OSTEOMYELITIS (HCC): Primary | ICD-10-CM

## 2023-11-03 PROCEDURE — G0277 HBOT, FULL BODY CHAMBER, 30M: HCPCS

## 2023-11-03 ASSESSMENT — PAIN DESCRIPTION - DESCRIPTORS
DESCRIPTORS: THROBBING
DESCRIPTORS: THROBBING

## 2023-11-03 ASSESSMENT — PAIN DESCRIPTION - LOCATION
LOCATION: FOOT
LOCATION: FOOT

## 2023-11-03 ASSESSMENT — PAIN DESCRIPTION - ORIENTATION
ORIENTATION: RIGHT
ORIENTATION: RIGHT

## 2023-11-03 ASSESSMENT — PAIN DESCRIPTION - PAIN TYPE
TYPE: CHRONIC PAIN
TYPE: CHRONIC PAIN

## 2023-11-03 ASSESSMENT — PAIN SCALES - GENERAL
PAINLEVEL_OUTOF10: 5
PAINLEVEL_OUTOF10: 5

## 2023-11-03 ASSESSMENT — PAIN DESCRIPTION - FREQUENCY
FREQUENCY: CONTINUOUS
FREQUENCY: CONTINUOUS

## 2023-11-03 ASSESSMENT — PAIN DESCRIPTION - ONSET
ONSET: ON-GOING
ONSET: ON-GOING

## 2023-11-03 NOTE — PROGRESS NOTES
[facility]. In my clinical judgement, ongoing HBO therapy is  necessary at this time, given a threat to patient function, limb or life from the current condition. Supervision and attendance of Hyperbaric Oxygen Therapy provided. Continue HBO treatment as outlined in the treatment plan. Hyperbaric Oxygen: Anjelica Soto tolerated Treatment Number: 44 well today without complications.      Electronically signed by Hector Halsted, APRN - CNP on 11/2/2023 at 8:02 AM

## 2023-11-06 ENCOUNTER — HOSPITAL ENCOUNTER (OUTPATIENT)
Dept: HYPERBARIC MEDICINE | Age: 63
Discharge: HOME | End: 2023-11-06
Payer: OTHER GOVERNMENT

## 2023-11-06 ENCOUNTER — HOSPITAL ENCOUNTER (OUTPATIENT)
Dept: WOUND CARE | Age: 63
Discharge: HOME | End: 2023-11-06
Payer: OTHER GOVERNMENT

## 2023-11-06 VITALS
SYSTOLIC BLOOD PRESSURE: 112 MMHG | RESPIRATION RATE: 84 BRPM | HEART RATE: 84 BPM | TEMPERATURE: 98 F | DIASTOLIC BLOOD PRESSURE: 44 MMHG

## 2023-11-06 VITALS
DIASTOLIC BLOOD PRESSURE: 79 MMHG | SYSTOLIC BLOOD PRESSURE: 172 MMHG | HEART RATE: 80 BPM | RESPIRATION RATE: 16 BRPM | TEMPERATURE: 97.3 F

## 2023-11-06 DIAGNOSIS — I73.9 PAD (PERIPHERAL ARTERY DISEASE) (HCC): ICD-10-CM

## 2023-11-06 DIAGNOSIS — L97.413 DIABETIC ULCER OF RIGHT MIDFOOT ASSOCIATED WITH TYPE 2 DIABETES MELLITUS, WITH NECROSIS OF MUSCLE (HCC): Primary | ICD-10-CM

## 2023-11-06 DIAGNOSIS — E11.69 DIABETIC FOOT ULCER WITH OSTEOMYELITIS (HCC): Primary | ICD-10-CM

## 2023-11-06 DIAGNOSIS — E11.9 DIABETES MELLITUS TREATED WITH INSULIN (HCC): ICD-10-CM

## 2023-11-06 DIAGNOSIS — Z79.4 DIABETES MELLITUS TREATED WITH INSULIN (HCC): ICD-10-CM

## 2023-11-06 DIAGNOSIS — M86.9 DIABETIC FOOT ULCER WITH OSTEOMYELITIS (HCC): Primary | ICD-10-CM

## 2023-11-06 DIAGNOSIS — E11.621 DIABETIC FOOT ULCER WITH OSTEOMYELITIS (HCC): Primary | ICD-10-CM

## 2023-11-06 DIAGNOSIS — E11.621 DIABETIC ULCER OF RIGHT MIDFOOT ASSOCIATED WITH TYPE 2 DIABETES MELLITUS, WITH NECROSIS OF MUSCLE (HCC): Primary | ICD-10-CM

## 2023-11-06 DIAGNOSIS — L97.509 DIABETIC FOOT ULCER WITH OSTEOMYELITIS (HCC): Primary | ICD-10-CM

## 2023-11-06 DIAGNOSIS — E11.69 DIABETIC FOOT ULCER WITH OSTEOMYELITIS (HCC): ICD-10-CM

## 2023-11-06 DIAGNOSIS — M86.9 DIABETIC FOOT ULCER WITH OSTEOMYELITIS (HCC): ICD-10-CM

## 2023-11-06 DIAGNOSIS — E11.621 DIABETIC FOOT ULCER WITH OSTEOMYELITIS (HCC): ICD-10-CM

## 2023-11-06 DIAGNOSIS — L97.509 DIABETIC FOOT ULCER WITH OSTEOMYELITIS (HCC): ICD-10-CM

## 2023-11-06 PROCEDURE — 99183 HYPERBARIC OXYGEN THERAPY: CPT | Performed by: NURSE PRACTITIONER

## 2023-11-06 PROCEDURE — 15275 SKIN SUB GRAFT FACE/NK/HF/G: CPT

## 2023-11-06 PROCEDURE — 15275 SKIN SUB GRAFT FACE/NK/HF/G: CPT | Performed by: NURSE PRACTITIONER

## 2023-11-06 PROCEDURE — G0277 HBOT, FULL BODY CHAMBER, 30M: HCPCS

## 2023-11-06 PROCEDURE — 15276 SKIN SUB GRAFT F/N/HF/G ADDL: CPT

## 2023-11-06 RX ORDER — SODIUM CHLOR/HYPOCHLOROUS ACID 0.033 %
SOLUTION, IRRIGATION IRRIGATION ONCE
OUTPATIENT
Start: 2023-11-06 | End: 2023-11-06

## 2023-11-06 RX ORDER — GENTAMICIN SULFATE 1 MG/G
OINTMENT TOPICAL ONCE
OUTPATIENT
Start: 2023-11-06 | End: 2023-11-06

## 2023-11-06 RX ORDER — CLOBETASOL PROPIONATE 0.5 MG/G
OINTMENT TOPICAL ONCE
OUTPATIENT
Start: 2023-11-06 | End: 2023-11-06

## 2023-11-06 RX ORDER — LIDOCAINE HYDROCHLORIDE 40 MG/ML
SOLUTION TOPICAL ONCE
OUTPATIENT
Start: 2023-11-06 | End: 2023-11-06

## 2023-11-06 RX ORDER — BACITRACIN ZINC AND POLYMYXIN B SULFATE 500; 1000 [USP'U]/G; [USP'U]/G
OINTMENT TOPICAL ONCE
OUTPATIENT
Start: 2023-11-06 | End: 2023-11-06

## 2023-11-06 RX ORDER — LIDOCAINE 40 MG/G
CREAM TOPICAL ONCE
OUTPATIENT
Start: 2023-11-06 | End: 2023-11-06

## 2023-11-06 RX ORDER — BETAMETHASONE DIPROPIONATE 0.05 %
OINTMENT (GRAM) TOPICAL ONCE
OUTPATIENT
Start: 2023-11-06 | End: 2023-11-06

## 2023-11-06 RX ORDER — TRIAMCINOLONE ACETONIDE 1 MG/G
OINTMENT TOPICAL ONCE
OUTPATIENT
Start: 2023-11-06 | End: 2023-11-06

## 2023-11-06 RX ORDER — IBUPROFEN 200 MG
TABLET ORAL ONCE
OUTPATIENT
Start: 2023-11-06 | End: 2023-11-06

## 2023-11-06 RX ORDER — LIDOCAINE 50 MG/G
OINTMENT TOPICAL ONCE
OUTPATIENT
Start: 2023-11-06 | End: 2023-11-06

## 2023-11-06 RX ORDER — GINSENG 100 MG
CAPSULE ORAL ONCE
OUTPATIENT
Start: 2023-11-06 | End: 2023-11-06

## 2023-11-06 ASSESSMENT — PAIN DESCRIPTION - FREQUENCY: FREQUENCY: INTERMITTENT

## 2023-11-06 ASSESSMENT — PAIN SCALES - GENERAL: PAINLEVEL_OUTOF10: 6

## 2023-11-06 ASSESSMENT — PAIN DESCRIPTION - ORIENTATION: ORIENTATION: RIGHT

## 2023-11-06 ASSESSMENT — PAIN DESCRIPTION - LOCATION: LOCATION: FOOT

## 2023-11-06 ASSESSMENT — PAIN DESCRIPTION - DESCRIPTORS: DESCRIPTORS: THROBBING

## 2023-11-06 ASSESSMENT — PAIN DESCRIPTION - PAIN TYPE: TYPE: CHRONIC PAIN

## 2023-11-06 ASSESSMENT — PAIN - FUNCTIONAL ASSESSMENT: PAIN_FUNCTIONAL_ASSESSMENT: PREVENTS OR INTERFERES SOME ACTIVE ACTIVITIES AND ADLS

## 2023-11-06 ASSESSMENT — PAIN DESCRIPTION - ONSET: ONSET: ON-GOING

## 2023-11-06 NOTE — PATIENT INSTRUCTIONS
PHYSICIAN ORDERS AND DISCHARGE INSTRUCTIONS     NOTE: Upon discharge from the  Medical [facility], you will receive a patient experience survey. We would be grateful if you would take the time to fill this survey out. Wound cleansing:               Do not scrub or use excessive force. Wash hands with soap and water before and after dressing changes. Prior to applying a clean dressing, cleanse wound with normal saline, wound cleanser, or mild soap and water. Ask the physician or nurse before getting the wound(s) wet in a shower. Wound Care Notes:   Applied for Kerecis grafts   Bony Latricia #1 applied to right plantar foot 10/30/23  Bony Latricia #1 applied to right plantar foot 11/6/23                               Orders for this week: 11/06/23      [facility]ax # [phone]     Right Plantar Foot Wound - Wash with soap and water, pat dry. Kerecis #2 applied to Right Plantar Foot, covered with versatel and secured with steri strips (make sure these layers stay in place for 1 week). Cover with ca alginate and 2 sorbex, cut slit in the first and layer the second on top (secured over wound with medipore tape)   Wrap with conform (Enclose toes with conform and wrap around ankle). Secure with tape. Wrap with Ace. Change top layers only on Thursday, GRAFT NEEDS TO STAY IN PLACE UNTIL NEXT MONDAY! Dispense 30 day quantity when ordering supplies. Follow up with Sarah Goncalves CNP in 1 week(s) in the wound care center. Call [phone] for any questions or concerns.

## 2023-11-06 NOTE — PLAN OF CARE
Kerecis Omega3 Treatment Note    NAME:  Zane Kenny  YOB: 1960  MEDICAL RECORD NUMBER:  [id number]  DATE:  11/6/2023    Goal:  Patient will receive safe and proper application of skin substitute. Patient will comply with caring for dressing, and reporting complications. The expiration date is checked immediately before use., The package was intact prior to use and no damage was noted., Alferd Jeancarlos is stored at room temperature.,  Alferd Jeancarlos was hydrated with sterile normal saline per the provider.,  Alferd Jeancarlos was removed from protective sterile packaging by the provider and applied to the prepared ulcer bed. ,  Alferd Jeancarlos was applied to Right Foot   , Keresis Omega 3 and affixed with steri-strips by the provider.,  Alferd Jeancarlos was covered with non-adherent ulcer dressing. , Applied ca alginate, sorbex, conform, coban  over non-adherent., Applied dry gauze and/or roll gauze. , Applied appropriate off-loading device. , The patient/caregiver was instructed not to remove the dressing and to keep it clean and dry., The patient/family/caregiver was instructed on the need for offloading and elevation of the affected extremity and on using the prescribed offloading device. , and The patient/family/caregiver was instructed on signs and symptoms of complication to report, such as draining through dressing, dressing falling down/slipping, getting wet, or severe pain or tingling.  Guidelines followed  Cathyann Mulders may only be used every 12 months per wound. Date of first application of Kerecis Omega 3 for this current wound is October 30, 2023.     Application # 2 out of 10    Electronically signed by Mendoza Ring RN on 11/6/2023 at 3:52 PM

## 2023-11-06 NOTE — PROGRESS NOTES
water.              Ask the physician or nurse before getting the wound(s) wet in a shower. Wound Care Notes:   Applied for Kerecis grafts   Fabienne Cruz #1 applied to right plantar foot 10/30/23  Fabienne Cruz #1 applied to right plantar foot 11/6/23                               Orders for this week: 11/06/23      [facility] COMPLET[facility]: Fax # [phone]     Right Plantar Foot Wound - Wash with soap and water, pat dry. Kerecis #2 applied to Right Plantar Foot, covered with versatel and secured with steri strips (make sure these layers stay in place for 1 week). Cover with ca alginate and 2 sorbex, cut slit in the first and layer the second on top (secured over wound with medipore tape)   Wrap with conform (Enclose toes with conform and wrap around ankle). Secure with tape. Wrap with Ace. Change top layers only on Thursday, GRAFT NEEDS TO STAY IN PLACE UNTIL NEXT MONDAY! Dispense 30 day quantity when ordering supplies. Follow up with Tata Jansen CNP in 1 week(s) in the wound care center. Call [phone] for any questions or concerns.      Electronically signed by ROBIN Sharma CNP on 11/6/2023 at 3:41 PM

## 2023-11-07 ENCOUNTER — HOSPITAL ENCOUNTER (OUTPATIENT)
Dept: HYPERBARIC MEDICINE | Age: 63
Discharge: HOME | End: 2023-11-07
Payer: OTHER GOVERNMENT

## 2023-11-07 VITALS
DIASTOLIC BLOOD PRESSURE: 73 MMHG | TEMPERATURE: 97.1 F | SYSTOLIC BLOOD PRESSURE: 150 MMHG | RESPIRATION RATE: 16 BRPM | HEART RATE: 86 BPM

## 2023-11-07 DIAGNOSIS — E11.621 DIABETIC FOOT ULCER WITH OSTEOMYELITIS (HCC): Primary | ICD-10-CM

## 2023-11-07 DIAGNOSIS — L97.509 DIABETIC FOOT ULCER WITH OSTEOMYELITIS (HCC): Primary | ICD-10-CM

## 2023-11-07 DIAGNOSIS — M86.9 DIABETIC FOOT ULCER WITH OSTEOMYELITIS (HCC): Primary | ICD-10-CM

## 2023-11-07 DIAGNOSIS — E11.69 DIABETIC FOOT ULCER WITH OSTEOMYELITIS (HCC): Primary | ICD-10-CM

## 2023-11-07 PROCEDURE — 99183 HYPERBARIC OXYGEN THERAPY: CPT | Performed by: NURSE PRACTITIONER

## 2023-11-07 PROCEDURE — G0277 HBOT, FULL BODY CHAMBER, 30M: HCPCS

## 2023-11-07 NOTE — PROGRESS NOTES
This SN reminded [facility] Hyperbaric Treatment Therapy Plan orders needed for patient's additional 40 treatments which started yesterday. Informed Provider need to release 2 orders today to catch the Therapy plan up to the correct treatment # .

## 2023-11-08 ENCOUNTER — HOSPITAL ENCOUNTER (OUTPATIENT)
Dept: HYPERBARIC MEDICINE | Age: 63
Discharge: HOME | End: 2023-11-08
Payer: OTHER GOVERNMENT

## 2023-11-08 VITALS
DIASTOLIC BLOOD PRESSURE: 69 MMHG | TEMPERATURE: 97.5 F | SYSTOLIC BLOOD PRESSURE: 136 MMHG | HEART RATE: 82 BPM | RESPIRATION RATE: 16 BRPM

## 2023-11-08 DIAGNOSIS — E11.69 DIABETIC FOOT ULCER WITH OSTEOMYELITIS (HCC): Primary | ICD-10-CM

## 2023-11-08 DIAGNOSIS — E11.621 DIABETIC FOOT ULCER WITH OSTEOMYELITIS (HCC): Primary | ICD-10-CM

## 2023-11-08 DIAGNOSIS — M86.9 DIABETIC FOOT ULCER WITH OSTEOMYELITIS (HCC): Primary | ICD-10-CM

## 2023-11-08 DIAGNOSIS — L97.509 DIABETIC FOOT ULCER WITH OSTEOMYELITIS (HCC): Primary | ICD-10-CM

## 2023-11-08 PROCEDURE — G0277 HBOT, FULL BODY CHAMBER, 30M: HCPCS

## 2023-11-08 ASSESSMENT — PAIN DESCRIPTION - ORIENTATION: ORIENTATION: RIGHT

## 2023-11-08 ASSESSMENT — PAIN DESCRIPTION - DESCRIPTORS: DESCRIPTORS: SHARP

## 2023-11-08 ASSESSMENT — PAIN - FUNCTIONAL ASSESSMENT: PAIN_FUNCTIONAL_ASSESSMENT: PREVENTS OR INTERFERES SOME ACTIVE ACTIVITIES AND ADLS

## 2023-11-08 ASSESSMENT — PAIN DESCRIPTION - PAIN TYPE: TYPE: ACUTE PAIN

## 2023-11-08 ASSESSMENT — PAIN DESCRIPTION - ONSET: ONSET: ON-GOING

## 2023-11-08 ASSESSMENT — PAIN DESCRIPTION - FREQUENCY: FREQUENCY: CONTINUOUS

## 2023-11-08 ASSESSMENT — PAIN SCALES - GENERAL: PAINLEVEL_OUTOF10: 8

## 2023-11-08 ASSESSMENT — PAIN DESCRIPTION - LOCATION: LOCATION: LEG

## 2023-11-09 ENCOUNTER — HOSPITAL ENCOUNTER (OUTPATIENT)
Dept: WOUND CARE | Age: 63
Discharge: HOME | End: 2023-11-09
Payer: OTHER GOVERNMENT

## 2023-11-09 ENCOUNTER — HOSPITAL ENCOUNTER (OUTPATIENT)
Dept: HYPERBARIC MEDICINE | Age: 63
Discharge: HOME | End: 2023-11-09
Payer: OTHER GOVERNMENT

## 2023-11-09 VITALS
SYSTOLIC BLOOD PRESSURE: 145 MMHG | DIASTOLIC BLOOD PRESSURE: 72 MMHG | RESPIRATION RATE: 16 BRPM | HEART RATE: 80 BPM | TEMPERATURE: 97.4 F

## 2023-11-09 DIAGNOSIS — E11.621 DIABETIC FOOT ULCER WITH OSTEOMYELITIS (HCC): Primary | ICD-10-CM

## 2023-11-09 DIAGNOSIS — E11.69 DIABETIC FOOT ULCER WITH OSTEOMYELITIS (HCC): Primary | ICD-10-CM

## 2023-11-09 DIAGNOSIS — M86.9 DIABETIC FOOT ULCER WITH OSTEOMYELITIS (HCC): Primary | ICD-10-CM

## 2023-11-09 DIAGNOSIS — L97.509 DIABETIC FOOT ULCER WITH OSTEOMYELITIS (HCC): Primary | ICD-10-CM

## 2023-11-09 PROCEDURE — 99213 OFFICE O/P EST LOW 20 MIN: CPT

## 2023-11-09 PROCEDURE — G0277 HBOT, FULL BODY CHAMBER, 30M: HCPCS

## 2023-11-09 ASSESSMENT — PAIN - FUNCTIONAL ASSESSMENT: PAIN_FUNCTIONAL_ASSESSMENT: PREVENTS OR INTERFERES SOME ACTIVE ACTIVITIES AND ADLS

## 2023-11-09 ASSESSMENT — PAIN DESCRIPTION - FREQUENCY: FREQUENCY: CONTINUOUS

## 2023-11-09 ASSESSMENT — PAIN DESCRIPTION - ORIENTATION: ORIENTATION: LEFT;RIGHT

## 2023-11-09 ASSESSMENT — PAIN DESCRIPTION - ONSET: ONSET: ON-GOING

## 2023-11-09 ASSESSMENT — PAIN DESCRIPTION - LOCATION: LOCATION: HAND

## 2023-11-09 ASSESSMENT — PAIN SCALES - GENERAL: PAINLEVEL_OUTOF10: 8

## 2023-11-09 ASSESSMENT — PAIN DESCRIPTION - PAIN TYPE: TYPE: ACUTE PAIN

## 2023-11-09 ASSESSMENT — PAIN DESCRIPTION - DESCRIPTORS: DESCRIPTORS: BURNING

## 2023-11-10 ENCOUNTER — HOSPITAL ENCOUNTER (OUTPATIENT)
Dept: HYPERBARIC MEDICINE | Age: 63
Discharge: HOME | End: 2023-11-10
Payer: OTHER GOVERNMENT

## 2023-11-10 VITALS
DIASTOLIC BLOOD PRESSURE: 76 MMHG | RESPIRATION RATE: 16 BRPM | SYSTOLIC BLOOD PRESSURE: 177 MMHG | TEMPERATURE: 97.8 F | HEART RATE: 72 BPM

## 2023-11-10 DIAGNOSIS — E11.69 DIABETIC FOOT ULCER WITH OSTEOMYELITIS (HCC): Primary | ICD-10-CM

## 2023-11-10 DIAGNOSIS — E11.621 DIABETIC FOOT ULCER WITH OSTEOMYELITIS (HCC): Primary | ICD-10-CM

## 2023-11-10 DIAGNOSIS — M86.9 DIABETIC FOOT ULCER WITH OSTEOMYELITIS (HCC): Primary | ICD-10-CM

## 2023-11-10 DIAGNOSIS — L97.509 DIABETIC FOOT ULCER WITH OSTEOMYELITIS (HCC): Primary | ICD-10-CM

## 2023-11-10 PROCEDURE — G0277 HBOT, FULL BODY CHAMBER, 30M: HCPCS

## 2023-11-10 NOTE — PROGRESS NOTES
at [address]. In my clinical judgement, ongoing HBO therapy is  necessary at this time, given a threat to patient function, limb or life from the current condition. Supervision and attendance of Hyperbaric Oxygen Therapy provided. Continue HBO treatment as outlined in the treatment plan. Hyperbaric Oxygen: [facility] tolerated Treatment Number: 40 well today without complications.      Electronically signed by ROBIN Vázquez CNP on 11/9/2023 at 7:12 AM

## 2023-11-13 ENCOUNTER — HOSPITAL ENCOUNTER (OUTPATIENT)
Dept: WOUND CARE | Age: 63
Discharge: HOME | End: 2023-11-13
Payer: OTHER GOVERNMENT

## 2023-11-13 ENCOUNTER — HOSPITAL ENCOUNTER (OUTPATIENT)
Dept: HYPERBARIC MEDICINE | Age: 63
Discharge: HOME | End: 2023-11-13
Payer: OTHER GOVERNMENT

## 2023-11-13 VITALS
SYSTOLIC BLOOD PRESSURE: 127 MMHG | RESPIRATION RATE: 18 BRPM | DIASTOLIC BLOOD PRESSURE: 66 MMHG | TEMPERATURE: 96.2 F | HEART RATE: 93 BPM

## 2023-11-13 VITALS
TEMPERATURE: 97.2 F | DIASTOLIC BLOOD PRESSURE: 87 MMHG | SYSTOLIC BLOOD PRESSURE: 164 MMHG | RESPIRATION RATE: 16 BRPM | HEART RATE: 76 BPM

## 2023-11-13 DIAGNOSIS — E11.621 DIABETIC FOOT ULCER WITH OSTEOMYELITIS (HCC): ICD-10-CM

## 2023-11-13 DIAGNOSIS — L97.509 DIABETIC FOOT ULCER WITH OSTEOMYELITIS (HCC): Primary | ICD-10-CM

## 2023-11-13 DIAGNOSIS — E11.621 DIABETIC FOOT ULCER WITH OSTEOMYELITIS (HCC): Primary | ICD-10-CM

## 2023-11-13 DIAGNOSIS — E11.9 DIABETES MELLITUS TREATED WITH INSULIN (HCC): ICD-10-CM

## 2023-11-13 DIAGNOSIS — E11.621 DIABETIC ULCER OF RIGHT MIDFOOT ASSOCIATED WITH TYPE 2 DIABETES MELLITUS, WITH NECROSIS OF MUSCLE (HCC): Primary | ICD-10-CM

## 2023-11-13 DIAGNOSIS — M86.9 DIABETIC FOOT ULCER WITH OSTEOMYELITIS (HCC): ICD-10-CM

## 2023-11-13 DIAGNOSIS — E11.69 DIABETIC FOOT ULCER WITH OSTEOMYELITIS (HCC): ICD-10-CM

## 2023-11-13 DIAGNOSIS — I73.9 PAD (PERIPHERAL ARTERY DISEASE) (HCC): ICD-10-CM

## 2023-11-13 DIAGNOSIS — L97.509 DIABETIC FOOT ULCER WITH OSTEOMYELITIS (HCC): ICD-10-CM

## 2023-11-13 DIAGNOSIS — E11.69 DIABETIC FOOT ULCER WITH OSTEOMYELITIS (HCC): Primary | ICD-10-CM

## 2023-11-13 DIAGNOSIS — L97.413 DIABETIC ULCER OF RIGHT MIDFOOT ASSOCIATED WITH TYPE 2 DIABETES MELLITUS, WITH NECROSIS OF MUSCLE (HCC): Primary | ICD-10-CM

## 2023-11-13 DIAGNOSIS — Z79.4 DIABETES MELLITUS TREATED WITH INSULIN (HCC): ICD-10-CM

## 2023-11-13 DIAGNOSIS — M86.9 DIABETIC FOOT ULCER WITH OSTEOMYELITIS (HCC): Primary | ICD-10-CM

## 2023-11-13 PROCEDURE — 11042 DBRDMT SUBQ TIS 1ST 20SQCM/<: CPT

## 2023-11-13 PROCEDURE — 15275 SKIN SUB GRAFT FACE/NK/HF/G: CPT

## 2023-11-13 PROCEDURE — G0277 HBOT, FULL BODY CHAMBER, 30M: HCPCS

## 2023-11-13 PROCEDURE — 15278 SKN SUB GRFT F/N/HF/G CH ADD: CPT

## 2023-11-13 RX ORDER — SODIUM CHLOR/HYPOCHLOROUS ACID 0.033 %
SOLUTION, IRRIGATION IRRIGATION ONCE
OUTPATIENT
Start: 2023-11-13 | End: 2023-11-13

## 2023-11-13 RX ORDER — GENTAMICIN SULFATE 1 MG/G
OINTMENT TOPICAL ONCE
OUTPATIENT
Start: 2023-11-13 | End: 2023-11-13

## 2023-11-13 RX ORDER — LIDOCAINE HYDROCHLORIDE 40 MG/ML
SOLUTION TOPICAL ONCE
OUTPATIENT
Start: 2023-11-13 | End: 2023-11-13

## 2023-11-13 RX ORDER — BETAMETHASONE DIPROPIONATE 0.05 %
OINTMENT (GRAM) TOPICAL ONCE
OUTPATIENT
Start: 2023-11-13 | End: 2023-11-13

## 2023-11-13 RX ORDER — IBUPROFEN 200 MG
TABLET ORAL ONCE
OUTPATIENT
Start: 2023-11-13 | End: 2023-11-13

## 2023-11-13 RX ORDER — CLOBETASOL PROPIONATE 0.5 MG/G
OINTMENT TOPICAL ONCE
OUTPATIENT
Start: 2023-11-13 | End: 2023-11-13

## 2023-11-13 RX ORDER — BACITRACIN ZINC AND POLYMYXIN B SULFATE 500; 1000 [USP'U]/G; [USP'U]/G
OINTMENT TOPICAL ONCE
OUTPATIENT
Start: 2023-11-13 | End: 2023-11-13

## 2023-11-13 RX ORDER — TRIAMCINOLONE ACETONIDE 1 MG/G
OINTMENT TOPICAL ONCE
OUTPATIENT
Start: 2023-11-13 | End: 2023-11-13

## 2023-11-13 RX ORDER — LIDOCAINE 50 MG/G
OINTMENT TOPICAL ONCE
OUTPATIENT
Start: 2023-11-13 | End: 2023-11-13

## 2023-11-13 RX ORDER — LIDOCAINE 40 MG/G
CREAM TOPICAL ONCE
OUTPATIENT
Start: 2023-11-13 | End: 2023-11-13

## 2023-11-13 RX ORDER — GINSENG 100 MG
CAPSULE ORAL ONCE
OUTPATIENT
Start: 2023-11-13 | End: 2023-11-13

## 2023-11-13 ASSESSMENT — PAIN SCALES - GENERAL: PAINLEVEL_OUTOF10: 0

## 2023-11-13 NOTE — PATIENT INSTRUCTIONS
PHYSICIAN ORDERS AND DISCHARGE INSTRUCTIONS     NOTE: Upon discharge from the [address] [place] [address], you will receive a patient experience survey. We would be grateful if you would take the time to fill this survey out. Wound cleansing:               Do not scrub or use excessive force. Wash hands with soap and water before and after dressing changes. Prior to applying a clean dressing, cleanse wound with normal saline, wound cleanser, or mild soap and water. Ask the physician or nurse before getting the wound(s) wet in a shower. Wound Care Notes:   Applied for Kerecis grafts   Binh Noon #1 applied to right plantar foot 10/30/23  Binh Noon #2 applied to right plantar foot 11/6/23  Binh Noon #3 applied to right plantar foot 11/13/23                             Orders for this week: 11/13/23      [facility] COMPLETED [facility]: Fax # [phone]     Right Plantar Foot Wound - Wash with soap and water, pat dry. Kerecis #3 applied to Right Plantar Foot, covered with sorbact, versatel and secured with steri strips (make sure these layers stay in place for 1 week). Cover with ca alginate and 2 sorbex, cut slit in the first and layer the second on top (secured over wound with medipore tape)   Wrap with conform (Enclose toes with conform and wrap around ankle). Secure with tape. Wrap with Ace. Change top layers only on Thursday, GRAFT NEEDS TO STAY IN PLACE UNTIL NEXT MONDAY! Right Toe wounds:   Apply endoform to wound bed. Ca alginate secured by steristrips between toes. cover with sorbex, Wrap with conform (Enclose toes with conform and wrap around ankle). Secure with tape. Wrap with Ace. Dispense 30 day quantity when ordering supplies. Follow up with Kristen Nixon CNP in 1 week(s) in the wound care center. Call [phone] for any questions or concerns.

## 2023-11-13 NOTE — PLAN OF CARE
Kerecis Omega3 Treatment Note    NAME:  Brooke Luz  YOB: 1960  MEDICAL RECORD NUMBER:  [id number]  DATE:  11/13/2023    Goal:  Patient will receive safe and proper application of skin substitute. Patient will comply with caring for dressing, and reporting complications. The expiration date is checked immediately before use., The package was intact prior to use and no damage was noted., Terrace Emmy is stored at room temperature.,  Terrace Emmy was hydrated with sterile normal saline per the provider.,  Terrace Emmy was removed from protective sterile packaging by the provider and applied to the prepared ulcer bed. ,  Terrace Emmy was applied to Right Foot Wound   , Keresis Omega 3 and affixed with steri-strips by the provider.,  Terrace Emmy was covered with non-adherent ulcer dressing. , Applied ca algiante, sorbex, conform, ace over non-adherent., Applied dry gauze and/or roll gauze. , Applied appropriate off-loading device. , The patient/caregiver was instructed not to remove the dressing and to keep it clean and dry., The patient/family/caregiver was instructed on the need for offloading and elevation of the affected extremity and on using the prescribed offloading device. , and The patient/family/caregiver was instructed on signs and symptoms of complication to report, such as draining through dressing, dressing falling down/slipping, getting wet, or severe pain or tingling.  Guidelines followed  Roise Pain may only be used every 12 months per wound. Date of first application of Kerecis Omega 3 for this current wound is October 30, 2023.     Application # 3 out of 10    Electronically signed by Brooks Williamson RN on 11/13/2023 at 3:15 PM

## 2023-11-14 ENCOUNTER — HOSPITAL ENCOUNTER (OUTPATIENT)
Dept: HYPERBARIC MEDICINE | Age: 63
Discharge: HOME | End: 2023-11-14
Payer: OTHER GOVERNMENT

## 2023-11-14 VITALS
HEART RATE: 93 BPM | SYSTOLIC BLOOD PRESSURE: 161 MMHG | DIASTOLIC BLOOD PRESSURE: 77 MMHG | RESPIRATION RATE: 24 BRPM | TEMPERATURE: 97.4 F

## 2023-11-14 DIAGNOSIS — E11.621 DIABETIC FOOT ULCER WITH OSTEOMYELITIS (HCC): Primary | ICD-10-CM

## 2023-11-14 DIAGNOSIS — L97.509 DIABETIC FOOT ULCER WITH OSTEOMYELITIS (HCC): Primary | ICD-10-CM

## 2023-11-14 DIAGNOSIS — M86.9 DIABETIC FOOT ULCER WITH OSTEOMYELITIS (HCC): Primary | ICD-10-CM

## 2023-11-14 DIAGNOSIS — E11.69 DIABETIC FOOT ULCER WITH OSTEOMYELITIS (HCC): Primary | ICD-10-CM

## 2023-11-14 PROCEDURE — G0277 HBOT, FULL BODY CHAMBER, 30M: HCPCS

## 2023-11-14 NOTE — PROGRESS NOTES
1410   Wound Etiology Diabetic 11/13/23 1410   Dressing Status New dressing applied 11/13/23 1459   Wound Cleansed Soap and water; Wound cleanser 11/13/23 1410   Dressing/Treatment Other (comment) 11/13/23 1459   Offloading for Diabetic Foot Ulcers Post op shoe 11/13/23 1410   Wound Length (cm) 9 cm 11/13/23 1410   Wound Width (cm) 5.2 cm 11/13/23 1410   Wound Depth (cm) 0.5 cm 11/13/23 1410   Wound Surface Area (cm^2) 46.8 cm^2 11/13/23 1410   Change in Wound Size % (l*w) 21.52 11/13/23 1410   Wound Volume (cm^3) 23.4 cm^3 11/13/23 1410   Wound Healing % 35 11/13/23 1410   Post-Procedure Length (cm) 9 cm 11/13/23 1440   Post-Procedure Width (cm) 5.2 cm 11/13/23 1440   Post-Procedure Depth (cm) 0.5 cm 11/13/23 1440   Post-Procedure Surface Area (cm^2) 46.8 cm^2 11/13/23 1440   Post-Procedure Volume (cm^3) 23.4 cm^3 11/13/23 1440   Distance Tunneling (cm) 0 cm 11/13/23 1410   Tunneling Position ___ O'Clock 0 11/13/23 1410   Undermining Starts ___ O'Clock 0 11/13/23 1410   Undermining Ends___ O'Clock 0 11/13/23 1410   Undermining Maxium Distance (cm) 0 11/13/23 1410   Wound Assessment Pink/red;Slough;Eschar moist 11/13/23 1410   Drainage Amount Copious (>75 % saturated) 11/13/23 1410   Drainage Description Green;Yellow;Brown 11/13/23 1410   Odor Moderate 11/13/23 1410   Brittni-wound Assessment Maceration; Hyperkeratosis (callous) 11/13/23 1410   Margins Defined edges 11/13/23 1410   Wound Thickness Description not for Pressure Injury Full thickness 11/13/23 1410   Number of days: 68       Wound 11/13/23 Toe (Comment  which one) Right right fourth toe #2 (Active)   Wound Image   11/13/23 1411   Wound Etiology Diabetic 11/13/23 1459   Dressing Status New dressing applied 11/13/23 1459   Wound Cleansed Wound cleanser; Soap and water 11/13/23 1411   Dressing/Treatment Other (comment) 11/13/23 1459   Offloading for Diabetic Foot Ulcers Post op shoe 11/13/23 1411   Wound Length (cm) 1 cm 11/13/23 1411   Wound Width (cm) 0.7 cm

## 2023-11-16 ENCOUNTER — HOSPITAL ENCOUNTER (OUTPATIENT)
Dept: WOUND CARE | Age: 63
Discharge: HOME | End: 2023-11-16
Payer: OTHER GOVERNMENT

## 2023-11-16 ENCOUNTER — HOSPITAL ENCOUNTER (OUTPATIENT)
Dept: HYPERBARIC MEDICINE | Age: 63
Discharge: HOME | End: 2023-11-16
Payer: OTHER GOVERNMENT

## 2023-11-16 VITALS
RESPIRATION RATE: 16 BRPM | HEART RATE: 82 BPM | TEMPERATURE: 97 F | DIASTOLIC BLOOD PRESSURE: 76 MMHG | SYSTOLIC BLOOD PRESSURE: 172 MMHG

## 2023-11-16 DIAGNOSIS — M86.9 DIABETIC FOOT ULCER WITH OSTEOMYELITIS (HCC): Primary | ICD-10-CM

## 2023-11-16 DIAGNOSIS — L97.509 DIABETIC FOOT ULCER WITH OSTEOMYELITIS (HCC): Primary | ICD-10-CM

## 2023-11-16 DIAGNOSIS — E11.69 DIABETIC FOOT ULCER WITH OSTEOMYELITIS (HCC): Primary | ICD-10-CM

## 2023-11-16 DIAGNOSIS — E11.621 DIABETIC FOOT ULCER WITH OSTEOMYELITIS (HCC): Primary | ICD-10-CM

## 2023-11-16 PROCEDURE — G0277 HBOT, FULL BODY CHAMBER, 30M: HCPCS

## 2023-11-16 PROCEDURE — 99213 OFFICE O/P EST LOW 20 MIN: CPT

## 2023-11-16 ASSESSMENT — PAIN SCALES - GENERAL: PAINLEVEL_OUTOF10: 9

## 2023-11-16 ASSESSMENT — PAIN DESCRIPTION - PAIN TYPE: TYPE: ACUTE PAIN

## 2023-11-16 ASSESSMENT — PAIN DESCRIPTION - LOCATION: LOCATION: HAND

## 2023-11-16 ASSESSMENT — PAIN DESCRIPTION - DESCRIPTORS: DESCRIPTORS: BURNING;TINGLING

## 2023-11-16 ASSESSMENT — PAIN DESCRIPTION - ORIENTATION: ORIENTATION: LEFT;RIGHT

## 2023-11-16 ASSESSMENT — PAIN DESCRIPTION - ONSET: ONSET: ON-GOING

## 2023-11-16 ASSESSMENT — PAIN - FUNCTIONAL ASSESSMENT: PAIN_FUNCTIONAL_ASSESSMENT: PREVENTS OR INTERFERES SOME ACTIVE ACTIVITIES AND ADLS

## 2023-11-16 ASSESSMENT — PAIN DESCRIPTION - FREQUENCY: FREQUENCY: CONTINUOUS

## 2023-11-17 ENCOUNTER — HOSPITAL ENCOUNTER (OUTPATIENT)
Dept: HYPERBARIC MEDICINE | Age: 63
Discharge: HOME | End: 2023-11-17
Payer: OTHER GOVERNMENT

## 2023-11-17 VITALS
RESPIRATION RATE: 16 BRPM | HEART RATE: 91 BPM | TEMPERATURE: 98.1 F | SYSTOLIC BLOOD PRESSURE: 158 MMHG | DIASTOLIC BLOOD PRESSURE: 80 MMHG

## 2023-11-17 DIAGNOSIS — M86.9 DIABETIC FOOT ULCER WITH OSTEOMYELITIS (HCC): Primary | ICD-10-CM

## 2023-11-17 DIAGNOSIS — L97.509 DIABETIC FOOT ULCER WITH OSTEOMYELITIS (HCC): Primary | ICD-10-CM

## 2023-11-17 DIAGNOSIS — E11.69 DIABETIC FOOT ULCER WITH OSTEOMYELITIS (HCC): Primary | ICD-10-CM

## 2023-11-17 DIAGNOSIS — E11.621 DIABETIC FOOT ULCER WITH OSTEOMYELITIS (HCC): Primary | ICD-10-CM

## 2023-11-17 PROCEDURE — G0277 HBOT, FULL BODY CHAMBER, 30M: HCPCS

## 2023-11-17 ASSESSMENT — PAIN DESCRIPTION - LOCATION
LOCATION: SHOULDER;HAND
LOCATION: FOOT

## 2023-11-17 ASSESSMENT — PAIN DESCRIPTION - PAIN TYPE
TYPE: CHRONIC PAIN
TYPE: ACUTE PAIN

## 2023-11-17 ASSESSMENT — PAIN DESCRIPTION - ORIENTATION
ORIENTATION: LEFT;RIGHT
ORIENTATION: RIGHT

## 2023-11-17 ASSESSMENT — PAIN DESCRIPTION - ONSET
ONSET: ON-GOING
ONSET: ON-GOING

## 2023-11-17 ASSESSMENT — PAIN DESCRIPTION - FREQUENCY
FREQUENCY: CONTINUOUS
FREQUENCY: CONTINUOUS

## 2023-11-17 ASSESSMENT — PAIN SCALES - GENERAL
PAINLEVEL_OUTOF10: 8
PAINLEVEL_OUTOF10: 6

## 2023-11-17 ASSESSMENT — PAIN DESCRIPTION - DESCRIPTORS
DESCRIPTORS: BURNING
DESCRIPTORS: THROBBING

## 2023-11-20 ENCOUNTER — HOSPITAL ENCOUNTER (OUTPATIENT)
Dept: HYPERBARIC MEDICINE | Age: 63
Discharge: HOME | End: 2023-11-20
Payer: OTHER GOVERNMENT

## 2023-11-20 ENCOUNTER — HOSPITAL ENCOUNTER (OUTPATIENT)
Dept: WOUND CARE | Age: 63
Discharge: HOME | End: 2023-11-20
Payer: OTHER GOVERNMENT

## 2023-11-20 VITALS
HEART RATE: 90 BPM | TEMPERATURE: 98 F | RESPIRATION RATE: 16 BRPM | SYSTOLIC BLOOD PRESSURE: 163 MMHG | DIASTOLIC BLOOD PRESSURE: 77 MMHG

## 2023-11-20 VITALS
TEMPERATURE: 97.5 F | HEART RATE: 86 BPM | DIASTOLIC BLOOD PRESSURE: 88 MMHG | RESPIRATION RATE: 16 BRPM | SYSTOLIC BLOOD PRESSURE: 174 MMHG

## 2023-11-20 DIAGNOSIS — E11.621 DIABETIC FOOT ULCER WITH OSTEOMYELITIS (HCC): ICD-10-CM

## 2023-11-20 DIAGNOSIS — L97.509 DIABETIC FOOT ULCER WITH OSTEOMYELITIS (HCC): Primary | ICD-10-CM

## 2023-11-20 DIAGNOSIS — E11.621 DIABETIC FOOT ULCER WITH OSTEOMYELITIS (HCC): Primary | ICD-10-CM

## 2023-11-20 DIAGNOSIS — I73.9 PAD (PERIPHERAL ARTERY DISEASE) (HCC): ICD-10-CM

## 2023-11-20 DIAGNOSIS — E11.69 DIABETIC FOOT ULCER WITH OSTEOMYELITIS (HCC): Primary | ICD-10-CM

## 2023-11-20 DIAGNOSIS — M86.9 DIABETIC FOOT ULCER WITH OSTEOMYELITIS (HCC): ICD-10-CM

## 2023-11-20 DIAGNOSIS — L97.509 DIABETIC FOOT ULCER WITH OSTEOMYELITIS (HCC): ICD-10-CM

## 2023-11-20 DIAGNOSIS — E11.621 DIABETIC ULCER OF RIGHT MIDFOOT ASSOCIATED WITH TYPE 2 DIABETES MELLITUS, WITH NECROSIS OF MUSCLE (HCC): Primary | ICD-10-CM

## 2023-11-20 DIAGNOSIS — Z79.4 DIABETES MELLITUS TREATED WITH INSULIN (HCC): ICD-10-CM

## 2023-11-20 DIAGNOSIS — E11.69 DIABETIC FOOT ULCER WITH OSTEOMYELITIS (HCC): ICD-10-CM

## 2023-11-20 DIAGNOSIS — M86.9 DIABETIC FOOT ULCER WITH OSTEOMYELITIS (HCC): Primary | ICD-10-CM

## 2023-11-20 DIAGNOSIS — L97.413 DIABETIC ULCER OF RIGHT MIDFOOT ASSOCIATED WITH TYPE 2 DIABETES MELLITUS, WITH NECROSIS OF MUSCLE (HCC): Primary | ICD-10-CM

## 2023-11-20 DIAGNOSIS — E11.9 DIABETES MELLITUS TREATED WITH INSULIN (HCC): ICD-10-CM

## 2023-11-20 PROCEDURE — 15275 SKIN SUB GRAFT FACE/NK/HF/G: CPT

## 2023-11-20 PROCEDURE — G0277 HBOT, FULL BODY CHAMBER, 30M: HCPCS

## 2023-11-20 PROCEDURE — 15276 SKIN SUB GRAFT F/N/HF/G ADDL: CPT

## 2023-11-20 RX ORDER — BETAMETHASONE DIPROPIONATE 0.05 %
OINTMENT (GRAM) TOPICAL ONCE
OUTPATIENT
Start: 2023-11-20 | End: 2023-11-20

## 2023-11-20 RX ORDER — IBUPROFEN 200 MG
TABLET ORAL ONCE
OUTPATIENT
Start: 2023-11-20 | End: 2023-11-20

## 2023-11-20 RX ORDER — LIDOCAINE 40 MG/G
CREAM TOPICAL ONCE
OUTPATIENT
Start: 2023-11-20 | End: 2023-11-20

## 2023-11-20 RX ORDER — LIDOCAINE HYDROCHLORIDE 40 MG/ML
SOLUTION TOPICAL ONCE
OUTPATIENT
Start: 2023-11-20 | End: 2023-11-20

## 2023-11-20 RX ORDER — SODIUM CHLOR/HYPOCHLOROUS ACID 0.033 %
SOLUTION, IRRIGATION IRRIGATION ONCE
OUTPATIENT
Start: 2023-11-20 | End: 2023-11-20

## 2023-11-20 RX ORDER — GENTAMICIN SULFATE 1 MG/G
OINTMENT TOPICAL ONCE
OUTPATIENT
Start: 2023-11-20 | End: 2023-11-20

## 2023-11-20 RX ORDER — GINSENG 100 MG
CAPSULE ORAL ONCE
OUTPATIENT
Start: 2023-11-20 | End: 2023-11-20

## 2023-11-20 RX ORDER — TRIAMCINOLONE ACETONIDE 1 MG/G
OINTMENT TOPICAL ONCE
OUTPATIENT
Start: 2023-11-20 | End: 2023-11-20

## 2023-11-20 RX ORDER — BACITRACIN ZINC AND POLYMYXIN B SULFATE 500; 1000 [USP'U]/G; [USP'U]/G
OINTMENT TOPICAL ONCE
OUTPATIENT
Start: 2023-11-20 | End: 2023-11-20

## 2023-11-20 RX ORDER — CLOBETASOL PROPIONATE 0.5 MG/G
OINTMENT TOPICAL ONCE
OUTPATIENT
Start: 2023-11-20 | End: 2023-11-20

## 2023-11-20 RX ORDER — LIDOCAINE 50 MG/G
OINTMENT TOPICAL ONCE
OUTPATIENT
Start: 2023-11-20 | End: 2023-11-20

## 2023-11-20 NOTE — PATIENT INSTRUCTIONS
PHYSICIAN ORDERS AND DISCHARGE INSTRUCTIONS     NOTE: Upon discharge from the  Medical [facility], you will receive a patient experience survey. We would be grateful if you would take the time to fill this survey out. Wound cleansing:               Do not scrub or use excessive force. Wash hands with soap and water before and after dressing changes. Prior to applying a clean dressing, cleanse wound with normal saline, wound cleanser, or mild soap and water. Ask the physician or nurse before getting the wound(s) wet in a shower. Wound Care Notes:   Applied for Kerecis grafts   Marshia Rayray #1 applied to right plantar foot 10/30/23  Marshia Rayray #2 applied to right plantar foot 11/6/23  Marshia Rayray #3 applied to right plantar foot 11/13/23  Marshia Rayray #4 applied to right plantar foot 11/20/23                             Orders for this week: 11/20/23      [facility]PLET[facility] Fax # [phone]     Right Plantar Foot Wound - Wash with soap and water, pat dry. Kerecis #4 applied to Right Plantar Foot, covered with sorbact, versatel and secured with steri strips (make sure these layers stay in place for 1 week). Cover with ca alginate and 2 sorbex, cut slit in the first and layer the second on top (secured over wound with medipore tape)   Wrap with conform (Enclose toes with conform and wrap around ankle). Secure with tape. Wrap with Ace. Change top layers only on Thursday, GRAFT NEEDS TO STAY IN PLACE UNTIL NEXT MONDAY! Right Toe wounds:   Apply endoform to wound bed. Ca alginate secured by steristrips between toes. cover with sorbex, Wrap with conform (Enclose toes with conform and wrap around ankle). Secure with tape. Wrap with Ace. Dispense 30 day quantity when ordering supplies. Follow up with Shawanda Bojorquez CNP in 1 week(s) in the wound care center. Call [phone] for any questions or concerns.

## 2023-11-20 NOTE — PROGRESS NOTES
Kerecis Omega3 Treatment Note    NAME:  Irma Elam  YOB: 1960  MEDICAL RECORD NUMBER:  [id number]  DATE:  11/20/2023    Goal:  Patient will receive safe and proper application of skin substitute. Patient will comply with caring for dressing, and reporting complications. The expiration date is checked immediately before use., The package was intact prior to use and no damage was noted., Vergie Ettrick is stored at room temperature.,  Vergie Ettrick was hydrated with sterile normal saline per the provider.,  Vergie Imelda was removed from protective sterile packaging by the provider and applied to the prepared ulcer bed. ,  Vergie Imelda was applied to right foot   , Keresis Omega 3 and affixed with steri-strips by the provider.,  Vergie Ettrick was covered with non-adherent ulcer dressing. , Applied  ca alginate, sorbex, conform, ace over non-adherent., Applied dry gauze and/or roll gauze. , Applied appropriate off-loading device. , The patient/caregiver was instructed not to remove the dressing and to keep it clean and dry., The patient/family/caregiver was instructed on the need for offloading and elevation of the affected extremity and on using the prescribed offloading device. , and The patient/family/caregiver was instructed on signs and symptoms of complication to report, such as draining through dressing, dressing falling down/slipping, getting wet, or severe pain or tingling.  Guidelines followed  Elida Bibiana may only be used every 12 months per wound. Date of first application of Kerecis Omega 3 for this current wound is October 30, 2023.     Application # 4 out of 10    Electronically signed by Tracy Thayer RN on 11/20/2023 at 2:40 PM

## 2023-11-21 ENCOUNTER — HOSPITAL ENCOUNTER (OUTPATIENT)
Dept: HYPERBARIC MEDICINE | Age: 63
Discharge: HOME | End: 2023-11-21
Payer: OTHER GOVERNMENT

## 2023-11-21 VITALS
HEART RATE: 80 BPM | SYSTOLIC BLOOD PRESSURE: 163 MMHG | RESPIRATION RATE: 16 BRPM | TEMPERATURE: 97.2 F | DIASTOLIC BLOOD PRESSURE: 78 MMHG

## 2023-11-21 DIAGNOSIS — E11.69 DIABETIC FOOT ULCER WITH OSTEOMYELITIS (HCC): Primary | ICD-10-CM

## 2023-11-21 DIAGNOSIS — L97.509 DIABETIC FOOT ULCER WITH OSTEOMYELITIS (HCC): Primary | ICD-10-CM

## 2023-11-21 DIAGNOSIS — E11.621 DIABETIC FOOT ULCER WITH OSTEOMYELITIS (HCC): Primary | ICD-10-CM

## 2023-11-21 DIAGNOSIS — M86.9 DIABETIC FOOT ULCER WITH OSTEOMYELITIS (HCC): Primary | ICD-10-CM

## 2023-11-21 PROCEDURE — G0277 HBOT, FULL BODY CHAMBER, 30M: HCPCS

## 2023-11-21 ASSESSMENT — PAIN DESCRIPTION - DESCRIPTORS: DESCRIPTORS: OTHER (COMMENT)

## 2023-11-21 ASSESSMENT — PAIN - FUNCTIONAL ASSESSMENT: PAIN_FUNCTIONAL_ASSESSMENT: PREVENTS OR INTERFERES SOME ACTIVE ACTIVITIES AND ADLS

## 2023-11-21 ASSESSMENT — PAIN DESCRIPTION - PAIN TYPE: TYPE: ACUTE PAIN

## 2023-11-21 ASSESSMENT — PAIN DESCRIPTION - LOCATION: LOCATION: HAND

## 2023-11-21 ASSESSMENT — PAIN DESCRIPTION - ORIENTATION: ORIENTATION: LEFT;RIGHT

## 2023-11-21 ASSESSMENT — PAIN DESCRIPTION - FREQUENCY: FREQUENCY: CONTINUOUS

## 2023-11-21 ASSESSMENT — PAIN DESCRIPTION - ONSET: ONSET: ON-GOING

## 2023-11-21 ASSESSMENT — PAIN SCALES - GENERAL: PAINLEVEL_OUTOF10: 8

## 2023-11-21 NOTE — PROGRESS NOTES
Plan          Cat Azul is a 61 y.o. male  did successfully complete today's hyperbaric oxygen treatment at [address]. In my clinical judgement, ongoing HBO therapy is  necessary at this time, given a threat to patient function, limb or life from the current condition. Supervision and attendance of Hyperbaric Oxygen Therapy provided. Continue HBO treatment as outlined in the treatment plan. Hyperbaric Oxygen: [facility] tolerated Treatment Number: 48 well today without complications.      Electronically signed by ROBIN Burnett CNP on 11/20/2023 at 7:18 AM

## 2023-11-21 NOTE — PROGRESS NOTES
Wound Care Center Progress Visit      Nadira Perez  AGE: 61 y.o. GENDER: male  : 1960  EPISODE DATE:  2023   Referred by: Radha Shirley     Subjective:     CHIEF COMPLAINT  WOUND   Problem List Items Addressed This Visit          Circulatory    WD-PAD (peripheral artery disease) ([address])       Endocrine    HBO-Diabetic foot ulcer with osteomyelitis ([address])    WD-Diabetic ulcer of right midfoot associated with type 2 diabetes mellitus, with necrosis of muscle ([address]) - Primary    WD-Diabetes mellitus treated with insulin [facility])     Chief Complaint   Patient presents with    Wound Check        HISTORY of PRESENT ILLNESS      Nadira Perez is a 61 y.o. male who presents to the [address] [place] [address] [address] for an initial visit for evaluation and treatment of Chronic diabetic ulcer(s) of  right foot. The condition is of marked severity. The ulcer has been present for several months at initial visit to the wound clinic. The underlying cause is thought to be diabetes complicated PAD and infection with osteomyelitis. The patients care to date has included care at the [place], currently inpatient receiving IV antibiotics. The patient has significant underlying medical conditions as below. Radha Shirley is PCP. The patient is a left BKA. Wound Pain Timing/Severity: intermittent, mild  Quality of pain: dull, aching, tender  Severity of pain:  2 / 10   Modifying Factors: diabetes, poor glucose control, chronic pressure, shear force, arterial insufficiency, and anticoagulation therapy  Associated Signs/Symptoms: edema, erythema, drainage, and pain    LACY: as indicated base on wound location and assessment      Wound infection: wound culture will be obtained as needed for symptoms of infection         Arterial evaluation: if indicated based on wound, location, symptoms and healing    Venous Evaluation: if indicated based on wound, location, symptoms and healing    Radiology:         No LMP for male patient. Diabetes:  On an insulin

## 2023-11-22 ENCOUNTER — HOSPITAL ENCOUNTER (OUTPATIENT)
Dept: HYPERBARIC MEDICINE | Age: 63
Discharge: HOME | End: 2023-11-22
Payer: OTHER GOVERNMENT

## 2023-11-22 VITALS
RESPIRATION RATE: 16 BRPM | DIASTOLIC BLOOD PRESSURE: 90 MMHG | SYSTOLIC BLOOD PRESSURE: 181 MMHG | TEMPERATURE: 97.3 F | HEART RATE: 81 BPM

## 2023-11-22 DIAGNOSIS — E11.69 DIABETIC FOOT ULCER WITH OSTEOMYELITIS (HCC): Primary | ICD-10-CM

## 2023-11-22 DIAGNOSIS — M86.9 DIABETIC FOOT ULCER WITH OSTEOMYELITIS (HCC): Primary | ICD-10-CM

## 2023-11-22 DIAGNOSIS — L97.509 DIABETIC FOOT ULCER WITH OSTEOMYELITIS (HCC): Primary | ICD-10-CM

## 2023-11-22 DIAGNOSIS — E11.621 DIABETIC FOOT ULCER WITH OSTEOMYELITIS (HCC): Primary | ICD-10-CM

## 2023-11-22 PROCEDURE — G0277 HBOT, FULL BODY CHAMBER, 30M: HCPCS

## 2023-11-27 ENCOUNTER — HOSPITAL ENCOUNTER (OUTPATIENT)
Dept: WOUND CARE | Age: 63
Discharge: HOME | End: 2023-11-27
Payer: OTHER GOVERNMENT

## 2023-11-27 ENCOUNTER — HOSPITAL ENCOUNTER (OUTPATIENT)
Dept: HYPERBARIC MEDICINE | Age: 63
Discharge: HOME | End: 2023-11-27
Payer: OTHER GOVERNMENT

## 2023-11-27 VITALS
SYSTOLIC BLOOD PRESSURE: 163 MMHG | RESPIRATION RATE: 16 BRPM | DIASTOLIC BLOOD PRESSURE: 89 MMHG | HEART RATE: 85 BPM | TEMPERATURE: 96.8 F

## 2023-11-27 DIAGNOSIS — M86.9 DIABETIC FOOT ULCER WITH OSTEOMYELITIS (HCC): ICD-10-CM

## 2023-11-27 DIAGNOSIS — L97.509 DIABETIC FOOT ULCER WITH OSTEOMYELITIS (HCC): Primary | ICD-10-CM

## 2023-11-27 DIAGNOSIS — E11.69 DIABETIC FOOT ULCER WITH OSTEOMYELITIS (HCC): Primary | ICD-10-CM

## 2023-11-27 DIAGNOSIS — I73.9 PAD (PERIPHERAL ARTERY DISEASE) (HCC): Primary | ICD-10-CM

## 2023-11-27 DIAGNOSIS — E11.621 DIABETIC ULCER OF RIGHT MIDFOOT ASSOCIATED WITH TYPE 2 DIABETES MELLITUS, WITH NECROSIS OF MUSCLE (HCC): ICD-10-CM

## 2023-11-27 DIAGNOSIS — L97.509 DIABETIC FOOT ULCER WITH OSTEOMYELITIS (HCC): ICD-10-CM

## 2023-11-27 DIAGNOSIS — M86.9 DIABETIC FOOT ULCER WITH OSTEOMYELITIS (HCC): Primary | ICD-10-CM

## 2023-11-27 DIAGNOSIS — E11.69 DIABETIC FOOT ULCER WITH OSTEOMYELITIS (HCC): ICD-10-CM

## 2023-11-27 DIAGNOSIS — L97.413 DIABETIC ULCER OF RIGHT MIDFOOT ASSOCIATED WITH TYPE 2 DIABETES MELLITUS, WITH NECROSIS OF MUSCLE (HCC): ICD-10-CM

## 2023-11-27 DIAGNOSIS — Z79.4 DIABETES MELLITUS TREATED WITH INSULIN (HCC): ICD-10-CM

## 2023-11-27 DIAGNOSIS — E11.621 DIABETIC FOOT ULCER WITH OSTEOMYELITIS (HCC): ICD-10-CM

## 2023-11-27 DIAGNOSIS — E11.621 DIABETIC FOOT ULCER WITH OSTEOMYELITIS (HCC): Primary | ICD-10-CM

## 2023-11-27 DIAGNOSIS — E11.9 DIABETES MELLITUS TREATED WITH INSULIN (HCC): ICD-10-CM

## 2023-11-27 PROCEDURE — G0277 HBOT, FULL BODY CHAMBER, 30M: HCPCS

## 2023-11-27 PROCEDURE — 15275 SKIN SUB GRAFT FACE/NK/HF/G: CPT

## 2023-11-27 RX ORDER — CLOBETASOL PROPIONATE 0.5 MG/G
OINTMENT TOPICAL ONCE
OUTPATIENT
Start: 2023-11-27 | End: 2023-11-27

## 2023-11-27 RX ORDER — BETAMETHASONE DIPROPIONATE 0.05 %
OINTMENT (GRAM) TOPICAL ONCE
OUTPATIENT
Start: 2023-11-27 | End: 2023-11-27

## 2023-11-27 RX ORDER — LIDOCAINE HYDROCHLORIDE 40 MG/ML
SOLUTION TOPICAL ONCE
OUTPATIENT
Start: 2023-11-27 | End: 2023-11-27

## 2023-11-27 RX ORDER — LIDOCAINE 50 MG/G
OINTMENT TOPICAL ONCE
OUTPATIENT
Start: 2023-11-27 | End: 2023-11-27

## 2023-11-27 RX ORDER — SODIUM CHLOR/HYPOCHLOROUS ACID 0.033 %
SOLUTION, IRRIGATION IRRIGATION ONCE
OUTPATIENT
Start: 2023-11-27 | End: 2023-11-27

## 2023-11-27 RX ORDER — BACITRACIN ZINC AND POLYMYXIN B SULFATE 500; 1000 [USP'U]/G; [USP'U]/G
OINTMENT TOPICAL ONCE
OUTPATIENT
Start: 2023-11-27 | End: 2023-11-27

## 2023-11-27 RX ORDER — TRIAMCINOLONE ACETONIDE 1 MG/G
OINTMENT TOPICAL ONCE
OUTPATIENT
Start: 2023-11-27 | End: 2023-11-27

## 2023-11-27 RX ORDER — IBUPROFEN 200 MG
TABLET ORAL ONCE
OUTPATIENT
Start: 2023-11-27 | End: 2023-11-27

## 2023-11-27 RX ORDER — LIDOCAINE 40 MG/G
CREAM TOPICAL ONCE
OUTPATIENT
Start: 2023-11-27 | End: 2023-11-27

## 2023-11-27 RX ORDER — GINSENG 100 MG
CAPSULE ORAL ONCE
OUTPATIENT
Start: 2023-11-27 | End: 2023-11-27

## 2023-11-27 RX ORDER — GENTAMICIN SULFATE 1 MG/G
OINTMENT TOPICAL ONCE
OUTPATIENT
Start: 2023-11-27 | End: 2023-11-27

## 2023-11-27 ASSESSMENT — PAIN DESCRIPTION - FREQUENCY: FREQUENCY: CONTINUOUS

## 2023-11-27 ASSESSMENT — PAIN SCALES - GENERAL: PAINLEVEL_OUTOF10: 8

## 2023-11-27 ASSESSMENT — PAIN DESCRIPTION - ORIENTATION: ORIENTATION: RIGHT

## 2023-11-27 ASSESSMENT — PAIN DESCRIPTION - ONSET: ONSET: ON-GOING

## 2023-11-27 ASSESSMENT — PAIN - FUNCTIONAL ASSESSMENT: PAIN_FUNCTIONAL_ASSESSMENT: PREVENTS OR INTERFERES SOME ACTIVE ACTIVITIES AND ADLS

## 2023-11-27 ASSESSMENT — PAIN DESCRIPTION - DESCRIPTORS: DESCRIPTORS: OTHER (COMMENT)

## 2023-11-27 ASSESSMENT — PAIN DESCRIPTION - PAIN TYPE: TYPE: ACUTE PAIN

## 2023-11-27 ASSESSMENT — PAIN DESCRIPTION - LOCATION: LOCATION: FOOT;KNEE

## 2023-11-27 NOTE — PROGRESS NOTES
Kerecis Omega3 Treatment Note    NAME:  Jesse Deleon  YOB: 1960  MEDICAL RECORD NUMBER:  [id number]  DATE:  11/27/2023    Goal:  Patient will receive safe and proper application of skin substitute. Patient will comply with caring for dressing, and reporting complications. The expiration date is checked immediately before use., The package was intact prior to use and no damage was noted., Lollie Glee is stored at room temperature.,  Lollie Glee was hydrated with sterile normal saline per the provider.,  Lollie Glee was removed from protective sterile packaging by the provider and applied to the prepared ulcer bed. ,  Lollie Glee was applied to Right Plantar Foot  , Keresis Omega 3 and affixed with steri-strips by the provider.,  Lollie Glee was covered with non-adherent ulcer dressing. , Applied ca alginate, Sorbex, conform and ACE over non-adherent., Applied dry gauze and/or roll gauze. , Applied appropriate off-loading device. , The patient/caregiver was instructed not to remove the dressing and to keep it clean and dry., The patient/family/caregiver was instructed on the need for offloading and elevation of the affected extremity and on using the prescribed offloading device. , and The patient/family/caregiver was instructed on signs and symptoms of complication to report, such as draining through dressing, dressing falling down/slipping, getting wet, or severe pain or tingling.  Guidelines followed  Larena Creamer may only be used every 12 months per wound. Date of first application of Kerecis Omega 3 for this current wound is October 30, 2023.     Application # 5 out of 10   Donated Fenestrated 3x7 Kerecis also applied    Electronically signed by Lorena Bean RN on 11/27/2023 at 11:36 AM
Take 1 tablet by mouth Daily      losartan (COZAAR) 25 MG tablet Take 1 tablet by mouth daily Hold if Systolic B/P < 301      metroNIDAZOLE (FLAGYL) 500 MG tablet Take 1 tablet by mouth 2 times daily      sodium chloride 0.9 % SOLN 100 mL with micafungin 100 MG SOLR 100 mg Infuse 100 mg intravenously daily       No current facility-administered medications on file prior to encounter. REVIEW OF SYSTEMS    Pertinent items are noted in HPI. Constitutional: Negative for systemic symptoms including fever, chills and malaise. Objective: There were no vitals taken for this visit. PHYSICAL EXAM      General: The patient is in no acute distress. Mental status:  Patient is appropriate, is  oriented to place and plan of care. Dermatologic exam: Visual inspection of the periwound reveals the skin to be normal in turgor and texture, dry, clammy, coarse, and sclerotic  Wound exam: see wound description below in procedure note      Assessment:     Problem List Items Addressed This Visit          Circulatory    WD-PAD (peripheral artery disease) ([address]) - Primary    Relevant Orders    Initiate Outpatient Wound Care Protocol       Endocrine    HBO-Diabetic foot ulcer with osteomyelitis ([address])    Relevant Orders    Initiate Outpatient Wound Care Protocol    WD-Diabetic ulcer of right midfoot associated with type 2 diabetes mellitus, with necrosis of muscle ([address])    Relevant Orders    Initiate Outpatient Wound Care Protocol    WD-Diabetes mellitus treated with insulin ([address])    Relevant Orders    Initiate Outpatient Wound Care Protocol       Conditions above and those listed in the past history are being treated appropriately and are considered under adequate control so as not to preclude the use of a graft. SKIN SUBSTITUTES/GRAFT APPLICATIONS PROCEDURE NOTE    Indicaton:     Chronic ulcer(s) of the right foot  that has(have) failed to heal with the usual wound protocol used for greater than 30 days.  See Epic

## 2023-11-27 NOTE — PATIENT INSTRUCTIONS
PHYSICIAN ORDERS AND DISCHARGE INSTRUCTIONS     NOTE: Upon discharge from the [address] [place] [address], you will receive a patient experience survey. We would be grateful if you would take the time to fill this survey out. Wound cleansing:               Do not scrub or use excessive force. Wash hands with soap and water before and after dressing changes. Prior to applying a clean dressing, cleanse wound with normal saline, wound cleanser, or mild soap and water. Ask the physician or nurse before getting the wound(s) wet in a shower. Wound Care Notes:   Applied for Kerecis grafts   Bond Johnny #1 applied to right plantar foot 10/30/23  Bond Johnny #2 applied to right plantar foot 11/6/23  Bond Johnny #3 applied to right plantar foot 11/13/23  Bond Johnny #4 applied to right plantar foot 11/20/23  Bond Johnny #5 applied to right plantar foot 11/27/23                             Orders for this week: 11/27/23      FAX COMPLETED [facility]: Fax # [phone]     Right Plantar Foot Wound - Wash with soap and water, pat dry. Kerecis #5 applied to Right Plantar Foot, covered with sorbact, versatel and secured with steri strips (make sure these layers stay in place for 1 week). Cover with ca alginate and 2 sorbex, cut slit in the first and layer the second on top (secured over wound with medipore tape)   Wrap with conform (Enclose toes with conform and wrap around ankle). Secure with tape. Wrap with Ace. Change top layers only on Thursday, GRAFT NEEDS TO STAY IN PLACE UNTIL NEXT MONDAY! Right Toe wounds:   Apply endoform to wound bed. Ca alginate secured by steristrips between toes. cover with sorbex, Wrap with conform (Enclose toes with conform and wrap around ankle). Secure with tape. Wrap with Ace. Dispense 30 day quantity when ordering supplies.        Nurse visit on Thursday to change top dressing (graft stays in place until Monday)  Follow up

## 2023-11-27 NOTE — PLAN OF CARE
Patient rested on right side with eyes closed during most of the treatment , no acute s/s of distress noted.

## 2023-11-29 ENCOUNTER — HOSPITAL ENCOUNTER (OUTPATIENT)
Dept: HYPERBARIC MEDICINE | Age: 63
Discharge: HOME | End: 2023-11-29
Payer: OTHER GOVERNMENT

## 2023-11-29 VITALS
TEMPERATURE: 97.4 F | HEART RATE: 93 BPM | RESPIRATION RATE: 16 BRPM | DIASTOLIC BLOOD PRESSURE: 82 MMHG | SYSTOLIC BLOOD PRESSURE: 144 MMHG

## 2023-11-29 DIAGNOSIS — M86.9 DIABETIC FOOT ULCER WITH OSTEOMYELITIS (HCC): Primary | ICD-10-CM

## 2023-11-29 DIAGNOSIS — E11.69 DIABETIC FOOT ULCER WITH OSTEOMYELITIS (HCC): Primary | ICD-10-CM

## 2023-11-29 DIAGNOSIS — E11.621 DIABETIC FOOT ULCER WITH OSTEOMYELITIS (HCC): Primary | ICD-10-CM

## 2023-11-29 DIAGNOSIS — L97.509 DIABETIC FOOT ULCER WITH OSTEOMYELITIS (HCC): Primary | ICD-10-CM

## 2023-11-29 PROCEDURE — G0277 HBOT, FULL BODY CHAMBER, 30M: HCPCS

## 2023-11-29 ASSESSMENT — PAIN DESCRIPTION - PAIN TYPE
TYPE: ACUTE PAIN
TYPE: ACUTE PAIN

## 2023-11-29 ASSESSMENT — PAIN DESCRIPTION - ORIENTATION
ORIENTATION: RIGHT
ORIENTATION: RIGHT

## 2023-11-29 ASSESSMENT — PAIN SCALES - GENERAL
PAINLEVEL_OUTOF10: 8
PAINLEVEL_OUTOF10: 8

## 2023-11-29 ASSESSMENT — PAIN DESCRIPTION - DESCRIPTORS
DESCRIPTORS: THROBBING;BURNING
DESCRIPTORS: THROBBING;BURNING

## 2023-11-29 ASSESSMENT — PAIN DESCRIPTION - ONSET
ONSET: ON-GOING
ONSET: ON-GOING

## 2023-11-29 ASSESSMENT — PAIN DESCRIPTION - FREQUENCY
FREQUENCY: CONTINUOUS
FREQUENCY: CONTINUOUS

## 2023-11-29 ASSESSMENT — PAIN DESCRIPTION - LOCATION
LOCATION: KNEE
LOCATION: KNEE

## 2023-11-30 ENCOUNTER — HOSPITAL ENCOUNTER (OUTPATIENT)
Dept: HYPERBARIC MEDICINE | Age: 63
Discharge: HOME | End: 2023-11-30
Payer: OTHER GOVERNMENT

## 2023-11-30 ENCOUNTER — HOSPITAL ENCOUNTER (OUTPATIENT)
Dept: WOUND CARE | Age: 63
Discharge: HOME | End: 2023-11-30
Payer: OTHER GOVERNMENT

## 2023-11-30 VITALS — SYSTOLIC BLOOD PRESSURE: 153 MMHG | TEMPERATURE: 98.3 F | DIASTOLIC BLOOD PRESSURE: 70 MMHG | HEART RATE: 80 BPM

## 2023-11-30 VITALS
HEART RATE: 88 BPM | SYSTOLIC BLOOD PRESSURE: 160 MMHG | DIASTOLIC BLOOD PRESSURE: 78 MMHG | RESPIRATION RATE: 16 BRPM | TEMPERATURE: 97.6 F

## 2023-11-30 DIAGNOSIS — M86.9 DIABETIC FOOT ULCER WITH OSTEOMYELITIS (HCC): Primary | ICD-10-CM

## 2023-11-30 DIAGNOSIS — E11.621 DIABETIC FOOT ULCER WITH OSTEOMYELITIS (HCC): Primary | ICD-10-CM

## 2023-11-30 DIAGNOSIS — E11.69 DIABETIC FOOT ULCER WITH OSTEOMYELITIS (HCC): Primary | ICD-10-CM

## 2023-11-30 DIAGNOSIS — L97.509 DIABETIC FOOT ULCER WITH OSTEOMYELITIS (HCC): Primary | ICD-10-CM

## 2023-11-30 PROCEDURE — 99213 OFFICE O/P EST LOW 20 MIN: CPT

## 2023-11-30 PROCEDURE — G0277 HBOT, FULL BODY CHAMBER, 30M: HCPCS

## 2023-11-30 ASSESSMENT — PAIN DESCRIPTION - DESCRIPTORS
DESCRIPTORS: THROBBING
DESCRIPTORS: THROBBING;BURNING;SHOOTING

## 2023-11-30 ASSESSMENT — PAIN DESCRIPTION - LOCATION
LOCATION: FOOT
LOCATION: FOOT

## 2023-11-30 ASSESSMENT — PAIN SCALES - GENERAL
PAINLEVEL_OUTOF10: 8
PAINLEVEL_OUTOF10: 8

## 2023-11-30 ASSESSMENT — PAIN DESCRIPTION - FREQUENCY
FREQUENCY: CONTINUOUS
FREQUENCY: CONTINUOUS

## 2023-11-30 ASSESSMENT — PAIN DESCRIPTION - ORIENTATION
ORIENTATION: RIGHT
ORIENTATION: RIGHT

## 2023-11-30 ASSESSMENT — PAIN DESCRIPTION - PAIN TYPE
TYPE: ACUTE PAIN
TYPE: ACUTE PAIN

## 2023-11-30 ASSESSMENT — PAIN DESCRIPTION - ONSET: ONSET: ON-GOING

## 2023-12-05 ENCOUNTER — HOSPITAL ENCOUNTER (OUTPATIENT)
Dept: HYPERBARIC MEDICINE | Age: 63
Discharge: HOME | End: 2023-12-05
Payer: OTHER GOVERNMENT

## 2023-12-05 ENCOUNTER — HOSPITAL ENCOUNTER (OUTPATIENT)
Dept: WOUND CARE | Age: 63
Discharge: HOME | End: 2023-12-05
Payer: OTHER GOVERNMENT

## 2023-12-05 VITALS
DIASTOLIC BLOOD PRESSURE: 95 MMHG | SYSTOLIC BLOOD PRESSURE: 181 MMHG | HEART RATE: 89 BPM | TEMPERATURE: 97.3 F | RESPIRATION RATE: 16 BRPM

## 2023-12-05 DIAGNOSIS — M86.9 DIABETIC FOOT ULCER WITH OSTEOMYELITIS (HCC): Primary | ICD-10-CM

## 2023-12-05 DIAGNOSIS — E11.621 DIABETIC ULCER OF RIGHT MIDFOOT ASSOCIATED WITH TYPE 2 DIABETES MELLITUS, WITH NECROSIS OF MUSCLE (HCC): Primary | ICD-10-CM

## 2023-12-05 DIAGNOSIS — E11.621 DIABETIC ULCER OF TOE OF RIGHT FOOT ASSOCIATED WITH TYPE 2 DIABETES MELLITUS, WITH NECROSIS OF BONE (HCC): ICD-10-CM

## 2023-12-05 DIAGNOSIS — L97.514 DIABETIC ULCER OF TOE OF RIGHT FOOT ASSOCIATED WITH TYPE 2 DIABETES MELLITUS, WITH NECROSIS OF BONE (HCC): ICD-10-CM

## 2023-12-05 DIAGNOSIS — E11.69 DIABETIC FOOT ULCER WITH OSTEOMYELITIS (HCC): ICD-10-CM

## 2023-12-05 DIAGNOSIS — L97.413 DIABETIC ULCER OF RIGHT MIDFOOT ASSOCIATED WITH TYPE 2 DIABETES MELLITUS, WITH NECROSIS OF MUSCLE (HCC): Primary | ICD-10-CM

## 2023-12-05 DIAGNOSIS — I73.9 PAD (PERIPHERAL ARTERY DISEASE) (HCC): ICD-10-CM

## 2023-12-05 DIAGNOSIS — M86.9 DIABETIC FOOT ULCER WITH OSTEOMYELITIS (HCC): ICD-10-CM

## 2023-12-05 DIAGNOSIS — L97.512 DIABETIC ULCER OF TOE OF RIGHT FOOT ASSOCIATED WITH TYPE 2 DIABETES MELLITUS, WITH FAT LAYER EXPOSED (HCC): ICD-10-CM

## 2023-12-05 DIAGNOSIS — E11.621 DIABETIC FOOT ULCER WITH OSTEOMYELITIS (HCC): ICD-10-CM

## 2023-12-05 DIAGNOSIS — L97.509 DIABETIC FOOT ULCER WITH OSTEOMYELITIS (HCC): ICD-10-CM

## 2023-12-05 DIAGNOSIS — E11.69 DIABETIC FOOT ULCER WITH OSTEOMYELITIS (HCC): Primary | ICD-10-CM

## 2023-12-05 DIAGNOSIS — E11.9 DIABETES MELLITUS TREATED WITH INSULIN (HCC): ICD-10-CM

## 2023-12-05 DIAGNOSIS — E11.621 DIABETIC FOOT ULCER WITH OSTEOMYELITIS (HCC): Primary | ICD-10-CM

## 2023-12-05 DIAGNOSIS — E11.621 DIABETIC ULCER OF TOE OF RIGHT FOOT ASSOCIATED WITH TYPE 2 DIABETES MELLITUS, WITH FAT LAYER EXPOSED (HCC): ICD-10-CM

## 2023-12-05 DIAGNOSIS — Z79.4 DIABETES MELLITUS TREATED WITH INSULIN (HCC): ICD-10-CM

## 2023-12-05 DIAGNOSIS — L97.509 DIABETIC FOOT ULCER WITH OSTEOMYELITIS (HCC): Primary | ICD-10-CM

## 2023-12-05 PROCEDURE — 87186 SC STD MICRODIL/AGAR DIL: CPT

## 2023-12-05 PROCEDURE — 87070 CULTURE OTHR SPECIMN AEROBIC: CPT

## 2023-12-05 PROCEDURE — 11044 DBRDMT BONE 1ST 20 SQ CM/<: CPT

## 2023-12-05 PROCEDURE — 88311 DECALCIFY TISSUE: CPT

## 2023-12-05 PROCEDURE — G0277 HBOT, FULL BODY CHAMBER, 30M: HCPCS

## 2023-12-05 PROCEDURE — 87077 CULTURE AEROBIC IDENTIFY: CPT

## 2023-12-05 PROCEDURE — 87075 CULTR BACTERIA EXCEPT BLOOD: CPT

## 2023-12-05 PROCEDURE — 15275 SKIN SUB GRAFT FACE/NK/HF/G: CPT

## 2023-12-05 PROCEDURE — 88305 TISSUE EXAM BY PATHOLOGIST: CPT

## 2023-12-05 RX ORDER — LIDOCAINE HYDROCHLORIDE 40 MG/ML
SOLUTION TOPICAL ONCE
OUTPATIENT
Start: 2023-12-05 | End: 2023-12-05

## 2023-12-05 RX ORDER — LIDOCAINE 50 MG/G
OINTMENT TOPICAL ONCE
OUTPATIENT
Start: 2023-12-05 | End: 2023-12-05

## 2023-12-05 RX ORDER — CLOBETASOL PROPIONATE 0.5 MG/G
OINTMENT TOPICAL ONCE
OUTPATIENT
Start: 2023-12-05 | End: 2023-12-05

## 2023-12-05 RX ORDER — GENTAMICIN SULFATE 1 MG/G
OINTMENT TOPICAL ONCE
OUTPATIENT
Start: 2023-12-05 | End: 2023-12-05

## 2023-12-05 RX ORDER — LIDOCAINE 40 MG/G
CREAM TOPICAL ONCE
OUTPATIENT
Start: 2023-12-05 | End: 2023-12-05

## 2023-12-05 RX ORDER — BACITRACIN ZINC AND POLYMYXIN B SULFATE 500; 1000 [USP'U]/G; [USP'U]/G
OINTMENT TOPICAL ONCE
OUTPATIENT
Start: 2023-12-05 | End: 2023-12-05

## 2023-12-05 RX ORDER — BETAMETHASONE DIPROPIONATE 0.05 %
OINTMENT (GRAM) TOPICAL ONCE
OUTPATIENT
Start: 2023-12-05 | End: 2023-12-05

## 2023-12-05 RX ORDER — IBUPROFEN 200 MG
TABLET ORAL ONCE
OUTPATIENT
Start: 2023-12-05 | End: 2023-12-05

## 2023-12-05 RX ORDER — SODIUM CHLOR/HYPOCHLOROUS ACID 0.033 %
SOLUTION, IRRIGATION IRRIGATION ONCE
OUTPATIENT
Start: 2023-12-05 | End: 2023-12-05

## 2023-12-05 RX ORDER — GINSENG 100 MG
CAPSULE ORAL ONCE
OUTPATIENT
Start: 2023-12-05 | End: 2023-12-05

## 2023-12-05 RX ORDER — TRIAMCINOLONE ACETONIDE 1 MG/G
OINTMENT TOPICAL ONCE
OUTPATIENT
Start: 2023-12-05 | End: 2023-12-05

## 2023-12-05 RX ORDER — GENTAMICIN SULFATE 1 MG/G
OINTMENT TOPICAL ONCE
Status: DISCONTINUED | OUTPATIENT
Start: 2023-12-05 | End: 2023-12-06 | Stop reason: HOSPADM

## 2023-12-05 ASSESSMENT — PAIN DESCRIPTION - ONSET: ONSET: ON-GOING

## 2023-12-05 ASSESSMENT — PAIN DESCRIPTION - PAIN TYPE: TYPE: ACUTE PAIN

## 2023-12-05 ASSESSMENT — PAIN - FUNCTIONAL ASSESSMENT: PAIN_FUNCTIONAL_ASSESSMENT: PREVENTS OR INTERFERES SOME ACTIVE ACTIVITIES AND ADLS

## 2023-12-05 ASSESSMENT — PAIN SCALES - GENERAL: PAINLEVEL_OUTOF10: 9

## 2023-12-05 ASSESSMENT — PAIN DESCRIPTION - LOCATION: LOCATION: KNEE

## 2023-12-05 ASSESSMENT — PAIN DESCRIPTION - FREQUENCY: FREQUENCY: CONTINUOUS

## 2023-12-05 ASSESSMENT — PAIN DESCRIPTION - ORIENTATION: ORIENTATION: RIGHT

## 2023-12-05 NOTE — PROGRESS NOTES
Kerecis Omega3 Treatment Note    NAME:  Benjamin Wren  YOB: 1960  MEDICAL RECORD NUMBER:  [id number]  DATE:  12/5/2023    Goal:  Patient will receive safe and proper application of skin substitute. Patient will comply with caring for dressing, and reporting complications. The expiration date is checked immediately before use., The package was intact prior to use and no damage was noted., [place] is stored at room temperature.,  [place] was hydrated with sterile normal saline per the provider.,  [place] was removed from protective sterile packaging by the provider and applied to the prepared ulcer bed. ,  [place] was applied to Right Plantar Foot  , Keresis Omega 3 and affixed with steri-strips by the provider.,  [place] was covered with non-adherent ulcer dressing. , Applied ca alginate, 2 Sorbex, Coflex 2 Lite over non-adherent., Applied dry gauze and/or roll gauze. , Applied appropriate off-loading device. , The patient/caregiver was instructed not to remove the dressing and to keep it clean and dry., The patient/family/caregiver was instructed on the need for offloading and elevation of the affected extremity and on using the prescribed offloading device. , and The patient/family/caregiver was instructed on signs and symptoms of complication to report, such as draining through dressing, dressing falling down/slipping, getting wet, or severe pain or tingling.  Guidelines followed  Juana Diaz Schillings may only be used every 12 months per wound. Date of first application of Kerecis Omega 3 for this current wound is October 30, 2023.     Application # 6 out of 10    Electronically signed by Caridad Perrin RN on 12/5/2023 at 5:58 PM

## 2023-12-05 NOTE — PROGRESS NOTES
Wound Care Center Progress Visit      Benjamin Wren  AGE: 61 y.o. GENDER: male  : 1960  EPISODE DATE:  2023   Referred by: Tana Rousseau     Subjective:     CHIEF COMPLAINT  WOUND   Problem List Items Addressed This Visit          Circulatory    WD-PAD (peripheral artery disease) ([address])    Relevant Medications    gentamicin (GARAMYCIN) 0.1 % ointment (Start on 2023  6:00 PM)    Other Relevant Orders    Initiate Outpatient Wound Care Protocol       Endocrine    HBO-Diabetic foot ulcer with osteomyelitis ([address])    Relevant Medications    gentamicin (GARAMYCIN) 0.1 % ointment (Start on 2023  6:00 PM)    Other Relevant Orders    Initiate Outpatient Wound Care Protocol    WD-Diabetic ulcer of right midfoot associated with type 2 diabetes mellitus, with necrosis of muscle ([address]) - Primary    Relevant Medications    gentamicin (GARAMYCIN) 0.1 % ointment (Start on 2023  6:00 PM)    Other Relevant Orders    Initiate Outpatient Wound Care Protocol    Specimen to Pathology Outpatient    Culture, Aerobic and Anaerobic    WD-Diabetes mellitus treated with insulin ([address])    Relevant Medications    gentamicin (GARAMYCIN) 0.1 % ointment (Start on 2023  6:00 PM)    Other Relevant Orders    Initiate Outpatient Wound Care Protocol    WD-Diabetic ulcer of toe of right foot associated with type 2 diabetes mellitus, with necrosis of bone ([address])    WD-Diabetic ulcer of toe (fifth toe) of right foot associated with type 2 diabetes mellitus, with fat layer exposed [facility])     Chief Complaint   Patient presents with    Wound Check        HISTORY of PRESENT ILLNESS      Benjamin Wren is a 61 y.o. male who presents to the [address] [place] [address] for an initial visit for evaluation and treatment of Chronic diabetic ulcer(s) of  right foot. The condition is of marked severity. The ulcer has been present for several months at initial visit to the wound clinic.  The underlying cause is thought to be diabetes complicated PAD

## 2023-12-05 NOTE — PROGRESS NOTES
Multilayer Compression Wrap   (Not Unna) Below the Knee    NAME:  Lala Age  YOB: 1960  MEDICAL RECORD NUMBER:  [id number]  DATE:  12/5/2023    Multilayer compression wrap: Removed old Multilayer wrap if indicated and wash leg with mild soap/water. Applied moisturizing agent to dry skin as needed. Applied primary and secondary dressing as ordered. Applied multilayered dressing below the knee to right lower leg. Instructed patient/caregiver not to remove dressing and to keep it clean and dry. Instructed patient/caregiver on complications to report to provider, such as pain, numbness in toes, heavy drainage, and slippage of dressing. Instructed patient on purpose of compression dressing and on activity and exercise recommendations.       Electronically signed by Madi Keane RN on 12/5/2023 at 5:57 PM

## 2023-12-05 NOTE — PATIENT INSTRUCTIONS
PHYSICIAN ORDERS AND DISCHARGE INSTRUCTIONS     NOTE: Upon discharge from the [address] [place] [address], you will receive a patient experience survey. We would be grateful if you would take the time to fill this survey out. Wound cleansing:               Do not scrub or use excessive force. Wash hands with soap and water before and after dressing changes. Prior to applying a clean dressing, cleanse wound with normal saline, wound cleanser, or mild soap and water. Ask the physician or nurse before getting the wound(s) wet in a shower. Wound Care Notes:   Applied for Kerecis grafts   Guerry Grew #1 applied to right plantar foot 10/30/23  Guerry Grew #2 applied to right plantar foot 11/6/23  Guerry Grew #3 applied to right plantar foot 11/13/23  Guerry Grew #4 applied to right plantar foot 11/20/23  Guerry Grew #5 applied to right plantar foot 11/27/23  Guerry Grew #6 applied to right plantar foot 12/5/23                             Orders for this week: 12/05/23      [facility]: Fax # [phone]     Right Plantar Foot Wound (portion not covered by graft) - Wash with soap and water, pat dry. Apply Gentamicin, Stimulen and Sorbact wound bed. Cover with 2 sorbex, cut slit in the first and layer the second on top (secured over wound with medipore tape)   Wrap with Coflex 2 Lite. (Enclose toes with conform and wrap around ankle). Secure with tape  Leave in place for 1 week. Right Plantar Foot Wound - Wash with soap and water, pat dry. Kerecis #6 applied to Right Plantar Foot, covered with sorbact, versatel and secured with steri strips (make sure these layers stay in place for 1 week). Cover with ca alginate and 2 sorbex, cut slit in the first and layer the second on top (secured over wound with medipore tape)   Wrap with Coflex 2 Lite. (Enclose toes with conform and wrap around ankle).  Secure with tape  Leave in place for 1 week, DO NOT

## 2023-12-06 ENCOUNTER — HOSPITAL ENCOUNTER (OUTPATIENT)
Dept: WOUND CARE | Age: 63
Discharge: HOME | End: 2023-12-06
Payer: OTHER GOVERNMENT

## 2023-12-06 PROCEDURE — 29581 APPL MULTLAYER CMPRN SYS LEG: CPT

## 2023-12-06 NOTE — PROGRESS NOTES
Multilayer Compression Wrap   (Not Unna) Below the Knee    NAME:  Beatriz Hertingham  YOB: 1960  MEDICAL RECORD NUMBER:  [id number]  DATE:  12/6/2023    Multilayer compression wrap: Removed old Multilayer wrap if indicated and wash leg with mild soap/water. Applied moisturizing agent to dry skin as needed. Applied primary and secondary dressing as ordered. Applied multilayered dressing below the knee to right lower leg. Instructed patient/caregiver not to remove dressing and to keep it clean and dry. Instructed patient/caregiver on complications to report to provider, such as pain, numbness in toes, heavy drainage, and slippage of dressing. Instructed patient on purpose of compression dressing and on activity and exercise recommendations.       Electronically signed by Freddy Hastings LPN on 84/3/6264 at 7:78 PM

## 2023-12-08 ENCOUNTER — HOSPITAL ENCOUNTER (OUTPATIENT)
Dept: HYPERBARIC MEDICINE | Age: 63
Discharge: HOME | End: 2023-12-08
Payer: OTHER GOVERNMENT

## 2023-12-08 ENCOUNTER — HOSPITAL ENCOUNTER (OUTPATIENT)
Dept: WOUND CARE | Age: 63
Discharge: HOME | End: 2023-12-08
Payer: OTHER GOVERNMENT

## 2023-12-08 VITALS
DIASTOLIC BLOOD PRESSURE: 82 MMHG | TEMPERATURE: 97.7 F | SYSTOLIC BLOOD PRESSURE: 167 MMHG | HEART RATE: 90 BPM | RESPIRATION RATE: 16 BRPM

## 2023-12-08 DIAGNOSIS — M86.9 DIABETIC FOOT ULCER WITH OSTEOMYELITIS (HCC): Primary | ICD-10-CM

## 2023-12-08 DIAGNOSIS — E11.621 DIABETIC FOOT ULCER WITH OSTEOMYELITIS (HCC): Primary | ICD-10-CM

## 2023-12-08 DIAGNOSIS — L97.509 DIABETIC FOOT ULCER WITH OSTEOMYELITIS (HCC): Primary | ICD-10-CM

## 2023-12-08 DIAGNOSIS — E11.69 DIABETIC FOOT ULCER WITH OSTEOMYELITIS (HCC): Primary | ICD-10-CM

## 2023-12-08 LAB
CULTURE: ABNORMAL
Lab: ABNORMAL
SPECIMEN: ABNORMAL

## 2023-12-08 PROCEDURE — 99213 OFFICE O/P EST LOW 20 MIN: CPT

## 2023-12-08 PROCEDURE — G0277 HBOT, FULL BODY CHAMBER, 30M: HCPCS

## 2023-12-08 ASSESSMENT — PAIN DESCRIPTION - DESCRIPTORS: DESCRIPTORS: ACHING

## 2023-12-08 ASSESSMENT — PAIN DESCRIPTION - ORIENTATION: ORIENTATION: OTHER (COMMENT)

## 2023-12-08 ASSESSMENT — PAIN DESCRIPTION - LOCATION: LOCATION: OTHER (COMMENT)

## 2023-12-08 ASSESSMENT — PAIN DESCRIPTION - ONSET: ONSET: ON-GOING

## 2023-12-08 ASSESSMENT — PAIN DESCRIPTION - PAIN TYPE: TYPE: ACUTE PAIN

## 2023-12-08 ASSESSMENT — PAIN - FUNCTIONAL ASSESSMENT: PAIN_FUNCTIONAL_ASSESSMENT: PREVENTS OR INTERFERES SOME ACTIVE ACTIVITIES AND ADLS

## 2023-12-08 ASSESSMENT — PAIN SCALES - GENERAL: PAINLEVEL_OUTOF10: 10

## 2023-12-08 ASSESSMENT — PAIN DESCRIPTION - FREQUENCY: FREQUENCY: CONTINUOUS

## 2023-12-11 ENCOUNTER — HOSPITAL ENCOUNTER (OUTPATIENT)
Dept: HYPERBARIC MEDICINE | Age: 63
Discharge: HOME | End: 2023-12-11
Payer: OTHER GOVERNMENT

## 2023-12-11 ENCOUNTER — HOSPITAL ENCOUNTER (OUTPATIENT)
Dept: WOUND CARE | Age: 63
Discharge: HOME | End: 2023-12-11
Payer: OTHER GOVERNMENT

## 2023-12-11 VITALS
SYSTOLIC BLOOD PRESSURE: 149 MMHG | DIASTOLIC BLOOD PRESSURE: 71 MMHG | HEART RATE: 98 BPM | RESPIRATION RATE: 16 BRPM | TEMPERATURE: 97.5 F

## 2023-12-11 VITALS
SYSTOLIC BLOOD PRESSURE: 146 MMHG | HEART RATE: 87 BPM | DIASTOLIC BLOOD PRESSURE: 70 MMHG | RESPIRATION RATE: 16 BRPM | TEMPERATURE: 96.3 F

## 2023-12-11 DIAGNOSIS — E11.9 DIABETES MELLITUS TREATED WITH INSULIN (HCC): ICD-10-CM

## 2023-12-11 DIAGNOSIS — E11.621 DIABETIC FOOT ULCER WITH OSTEOMYELITIS (HCC): Primary | ICD-10-CM

## 2023-12-11 DIAGNOSIS — E11.69 DIABETIC FOOT ULCER WITH OSTEOMYELITIS (HCC): Primary | ICD-10-CM

## 2023-12-11 DIAGNOSIS — Z79.4 DIABETES MELLITUS TREATED WITH INSULIN (HCC): ICD-10-CM

## 2023-12-11 DIAGNOSIS — E11.621 DIABETIC ULCER OF TOE OF RIGHT FOOT ASSOCIATED WITH TYPE 2 DIABETES MELLITUS, WITH NECROSIS OF BONE (HCC): ICD-10-CM

## 2023-12-11 DIAGNOSIS — L97.512 DIABETIC ULCER OF TOE OF RIGHT FOOT ASSOCIATED WITH TYPE 2 DIABETES MELLITUS, WITH FAT LAYER EXPOSED (HCC): ICD-10-CM

## 2023-12-11 DIAGNOSIS — E11.621 DIABETIC ULCER OF RIGHT MIDFOOT ASSOCIATED WITH TYPE 2 DIABETES MELLITUS, WITH NECROSIS OF MUSCLE (HCC): ICD-10-CM

## 2023-12-11 DIAGNOSIS — L97.509 DIABETIC FOOT ULCER WITH OSTEOMYELITIS (HCC): Primary | ICD-10-CM

## 2023-12-11 DIAGNOSIS — M86.9 DIABETIC FOOT ULCER WITH OSTEOMYELITIS (HCC): Primary | ICD-10-CM

## 2023-12-11 DIAGNOSIS — L97.413 DIABETIC ULCER OF RIGHT MIDFOOT ASSOCIATED WITH TYPE 2 DIABETES MELLITUS, WITH NECROSIS OF MUSCLE (HCC): ICD-10-CM

## 2023-12-11 DIAGNOSIS — E11.621 DIABETIC ULCER OF TOE OF RIGHT FOOT ASSOCIATED WITH TYPE 2 DIABETES MELLITUS, WITH FAT LAYER EXPOSED (HCC): ICD-10-CM

## 2023-12-11 DIAGNOSIS — I73.9 PAD (PERIPHERAL ARTERY DISEASE) (HCC): ICD-10-CM

## 2023-12-11 DIAGNOSIS — L97.514 DIABETIC ULCER OF TOE OF RIGHT FOOT ASSOCIATED WITH TYPE 2 DIABETES MELLITUS, WITH NECROSIS OF BONE (HCC): ICD-10-CM

## 2023-12-11 LAB
CULTURE: ABNORMAL
Lab: ABNORMAL
SPECIMEN: ABNORMAL

## 2023-12-11 PROCEDURE — G0277 HBOT, FULL BODY CHAMBER, 30M: HCPCS

## 2023-12-11 PROCEDURE — 11042 DBRDMT SUBQ TIS 1ST 20SQCM/<: CPT

## 2023-12-11 PROCEDURE — 11045 DBRDMT SUBQ TISS EACH ADDL: CPT

## 2023-12-11 PROCEDURE — 99183 HYPERBARIC OXYGEN THERAPY: CPT | Performed by: NURSE PRACTITIONER

## 2023-12-11 RX ORDER — LIDOCAINE 40 MG/G
CREAM TOPICAL ONCE
OUTPATIENT
Start: 2023-12-11 | End: 2023-12-11

## 2023-12-11 RX ORDER — GINSENG 100 MG
CAPSULE ORAL ONCE
OUTPATIENT
Start: 2023-12-11 | End: 2023-12-11

## 2023-12-11 RX ORDER — TRIAMCINOLONE ACETONIDE 1 MG/G
OINTMENT TOPICAL ONCE
OUTPATIENT
Start: 2023-12-11 | End: 2023-12-11

## 2023-12-11 RX ORDER — IBUPROFEN 200 MG
TABLET ORAL ONCE
OUTPATIENT
Start: 2023-12-11 | End: 2023-12-11

## 2023-12-11 RX ORDER — GENTAMICIN SULFATE 1 MG/G
OINTMENT TOPICAL ONCE
OUTPATIENT
Start: 2023-12-11 | End: 2023-12-11

## 2023-12-11 RX ORDER — BETAMETHASONE DIPROPIONATE 0.05 %
OINTMENT (GRAM) TOPICAL ONCE
OUTPATIENT
Start: 2023-12-11 | End: 2023-12-11

## 2023-12-11 RX ORDER — LIDOCAINE 50 MG/G
OINTMENT TOPICAL ONCE
OUTPATIENT
Start: 2023-12-11 | End: 2023-12-11

## 2023-12-11 RX ORDER — LIDOCAINE HYDROCHLORIDE 40 MG/ML
SOLUTION TOPICAL ONCE
OUTPATIENT
Start: 2023-12-11 | End: 2023-12-11

## 2023-12-11 RX ORDER — BACITRACIN ZINC AND POLYMYXIN B SULFATE 500; 1000 [USP'U]/G; [USP'U]/G
OINTMENT TOPICAL ONCE
OUTPATIENT
Start: 2023-12-11 | End: 2023-12-11

## 2023-12-11 RX ORDER — CLOBETASOL PROPIONATE 0.5 MG/G
OINTMENT TOPICAL ONCE
OUTPATIENT
Start: 2023-12-11 | End: 2023-12-11

## 2023-12-11 RX ORDER — SODIUM CHLOR/HYPOCHLOROUS ACID 0.033 %
SOLUTION, IRRIGATION IRRIGATION ONCE
OUTPATIENT
Start: 2023-12-11 | End: 2023-12-11

## 2023-12-11 ASSESSMENT — PAIN - FUNCTIONAL ASSESSMENT: PAIN_FUNCTIONAL_ASSESSMENT: PREVENTS OR INTERFERES SOME ACTIVE ACTIVITIES AND ADLS

## 2023-12-11 ASSESSMENT — PAIN DESCRIPTION - ONSET: ONSET: ON-GOING

## 2023-12-11 ASSESSMENT — PAIN DESCRIPTION - PAIN TYPE: TYPE: CHRONIC PAIN

## 2023-12-11 ASSESSMENT — PAIN DESCRIPTION - LOCATION: LOCATION: FOOT

## 2023-12-11 ASSESSMENT — PAIN DESCRIPTION - FREQUENCY: FREQUENCY: CONTINUOUS

## 2023-12-11 ASSESSMENT — PAIN DESCRIPTION - DESCRIPTORS: DESCRIPTORS: THROBBING

## 2023-12-11 ASSESSMENT — PAIN DESCRIPTION - ORIENTATION: ORIENTATION: RIGHT

## 2023-12-11 ASSESSMENT — PAIN SCALES - GENERAL: PAINLEVEL_OUTOF10: 10

## 2023-12-11 NOTE — PROGRESS NOTES
Multilayer Compression Wrap   (Not Unna) Below the Knee    NAME:  Montana Stallworth  YOB: 1960  MEDICAL RECORD NUMBER:  [id number]  DATE:  12/11/2023    Multilayer compression wrap: Removed old Multilayer wrap if indicated and wash leg with mild soap/water. Applied moisturizing agent to dry skin as needed. Applied primary and secondary dressing as ordered. Applied multilayered dressing below the knee to right lower leg. Instructed patient/caregiver not to remove dressing and to keep it clean and dry. Instructed patient/caregiver on complications to report to provider, such as pain, numbness in toes, heavy drainage, and slippage of dressing. Instructed patient on purpose of compression dressing and on activity and exercise recommendations.       Electronically signed by Carlos Osei RN on 12/11/2023 at 12:01 PM
counseling and coordination of care. Plan:     Patient Instructions   PHYSICIAN ORDERS AND DISCHARGE INSTRUCTIONS    Wound cleansing:               Do not scrub or use excessive force. Wash hands with soap and water before and after dressing changes. Prior to applying a clean dressing, cleanse wound with normal saline, wound cleanser, or mild soap and water. Ask the physician or nurse before getting the wound(s) wet in a shower. Wound Care Notes:   Applied for Kerecis grafts   Marshia Rayray #1 applied to right plantar foot 10/30/23  Marshia Rayray #2 applied to right plantar foot 11/6/23  Marshia Rayray #3 applied to right plantar foot 11/13/23  Marshia Rayray #4 applied to right plantar foot 11/20/23  Marshia Rayray #5 applied to right plantar foot 11/27/23  Marshia Rayray #6 applied to right plantar foot 12/5/23                             Orders for this week: 12/11/23      [facility] COMPLET[facility]: Fax # [phone]     Right Plantar Foot Wound (portion not covered by graft) - Wash with soap and water, pat dry. Apply Santyl, Gentamicin, Stimulen and Sorbact wound bed. (3 layers thick)   Cover with ca alginate and 2 sorbex, cut slit in the first and layer the second on top (secured over wound with medipore tape)   Wrap with Coflex 2 Lite. (Enclose toes with conform and wrap around ankle). Secure with tape  Leave in place for 1 week. [x]    Nurse visit: Thursday   Follow up with Shawanda Bojorquez CNP in 1 week(s) in the wound care center. Call [phone] for any questions or concerns.      Electronically signed by ROBIN Hannah CNP on 12/11/2023 at 2:12 PM

## 2023-12-11 NOTE — PATIENT INSTRUCTIONS
PHYSICIAN ORDERS AND DISCHARGE INSTRUCTIONS    Wound cleansing:               Do not scrub or use excessive force. Wash hands with soap and water before and after dressing changes. Prior to applying a clean dressing, cleanse wound with normal saline, wound cleanser, or mild soap and water. Ask the physician or nurse before getting the wound(s) wet in a shower. Wound Care Notes:   Applied for Kerecis grafts   Stephania Peeks #1 applied to right plantar foot 10/30/23  Stephania Peeks #2 applied to right plantar foot 11/6/23  Stephania Peeks #3 applied to right plantar foot 11/13/23  Stephania Peeks #4 applied to right plantar foot 11/20/23  Stephania Peeks #5 applied to right plantar foot 11/27/23  Stephania Peeks #6 applied to right plantar foot 12/5/23                             Orders for this week: 12/11/23      [facility] COMPLET[facility]: Fax # [phone]     Right Plantar Foot Wound (portion not covered by graft) - Wash with soap and water, pat dry. Apply Santyl, Gentamicin, Stimulen and Sorbact wound bed. (3 layers thick)   Cover with ca alginate and 2 sorbex, cut slit in the first and layer the second on top (secured over wound with medipore tape)   Wrap with Coflex 2 Lite. (Enclose toes with conform and wrap around ankle). Secure with tape  Leave in place for 1 week. [x]    Nurse visit: Thursday   Follow up with Jelly Oliveros CNP in 1 week(s) in the wound care center. Call [phone] for any questions or concerns.

## 2023-12-12 ENCOUNTER — HOSPITAL ENCOUNTER (OUTPATIENT)
Dept: HYPERBARIC MEDICINE | Age: 63
Discharge: HOME | End: 2023-12-12
Payer: OTHER GOVERNMENT

## 2023-12-12 VITALS
SYSTOLIC BLOOD PRESSURE: 170 MMHG | DIASTOLIC BLOOD PRESSURE: 70 MMHG | TEMPERATURE: 98.1 F | HEART RATE: 90 BPM | RESPIRATION RATE: 16 BRPM

## 2023-12-12 DIAGNOSIS — L97.413 DIABETIC ULCER OF RIGHT MIDFOOT ASSOCIATED WITH TYPE 2 DIABETES MELLITUS, WITH NECROSIS OF MUSCLE (HCC): ICD-10-CM

## 2023-12-12 DIAGNOSIS — E11.621 DIABETIC ULCER OF RIGHT MIDFOOT ASSOCIATED WITH TYPE 2 DIABETES MELLITUS, WITH NECROSIS OF MUSCLE (HCC): ICD-10-CM

## 2023-12-12 DIAGNOSIS — E11.621 DIABETIC FOOT ULCER WITH OSTEOMYELITIS (HCC): Primary | ICD-10-CM

## 2023-12-12 DIAGNOSIS — E11.621 DIABETIC ULCER OF TOE OF RIGHT FOOT ASSOCIATED WITH TYPE 2 DIABETES MELLITUS, WITH FAT LAYER EXPOSED (HCC): ICD-10-CM

## 2023-12-12 DIAGNOSIS — L97.512 DIABETIC ULCER OF TOE OF RIGHT FOOT ASSOCIATED WITH TYPE 2 DIABETES MELLITUS, WITH FAT LAYER EXPOSED (HCC): ICD-10-CM

## 2023-12-12 DIAGNOSIS — E11.69 DIABETIC FOOT ULCER WITH OSTEOMYELITIS (HCC): Primary | ICD-10-CM

## 2023-12-12 DIAGNOSIS — I73.9 PAD (PERIPHERAL ARTERY DISEASE) (HCC): ICD-10-CM

## 2023-12-12 DIAGNOSIS — L97.514 DIABETIC ULCER OF TOE OF RIGHT FOOT ASSOCIATED WITH TYPE 2 DIABETES MELLITUS, WITH NECROSIS OF BONE (HCC): ICD-10-CM

## 2023-12-12 DIAGNOSIS — E11.9 DIABETES MELLITUS TREATED WITH INSULIN (HCC): ICD-10-CM

## 2023-12-12 DIAGNOSIS — L97.509 DIABETIC FOOT ULCER WITH OSTEOMYELITIS (HCC): Primary | ICD-10-CM

## 2023-12-12 DIAGNOSIS — Z79.4 DIABETES MELLITUS TREATED WITH INSULIN (HCC): ICD-10-CM

## 2023-12-12 DIAGNOSIS — E11.621 DIABETIC ULCER OF TOE OF RIGHT FOOT ASSOCIATED WITH TYPE 2 DIABETES MELLITUS, WITH NECROSIS OF BONE (HCC): ICD-10-CM

## 2023-12-12 DIAGNOSIS — M86.9 DIABETIC FOOT ULCER WITH OSTEOMYELITIS (HCC): Primary | ICD-10-CM

## 2023-12-12 PROCEDURE — G0277 HBOT, FULL BODY CHAMBER, 30M: HCPCS

## 2023-12-12 PROCEDURE — 99183 HYPERBARIC OXYGEN THERAPY: CPT | Performed by: NURSE PRACTITIONER

## 2023-12-12 ASSESSMENT — PAIN DESCRIPTION - ONSET
ONSET: ON-GOING
ONSET: ON-GOING

## 2023-12-12 ASSESSMENT — PAIN DESCRIPTION - ORIENTATION
ORIENTATION: RIGHT
ORIENTATION: RIGHT

## 2023-12-12 ASSESSMENT — PAIN DESCRIPTION - PAIN TYPE
TYPE: ACUTE PAIN
TYPE: ACUTE PAIN

## 2023-12-12 ASSESSMENT — PAIN DESCRIPTION - FREQUENCY
FREQUENCY: CONTINUOUS
FREQUENCY: CONTINUOUS

## 2023-12-12 ASSESSMENT — PAIN DESCRIPTION - LOCATION
LOCATION: FOOT;LEG
LOCATION: FOOT;LEG

## 2023-12-12 ASSESSMENT — PAIN DESCRIPTION - DESCRIPTORS: DESCRIPTORS: THROBBING

## 2023-12-14 ENCOUNTER — HOSPITAL ENCOUNTER (OUTPATIENT)
Dept: WOUND CARE | Age: 63
Discharge: HOME | End: 2023-12-14
Payer: OTHER GOVERNMENT

## 2023-12-14 VITALS
HEART RATE: 84 BPM | DIASTOLIC BLOOD PRESSURE: 65 MMHG | TEMPERATURE: 97.8 F | SYSTOLIC BLOOD PRESSURE: 143 MMHG | RESPIRATION RATE: 20 BRPM

## 2023-12-14 PROCEDURE — 99213 OFFICE O/P EST LOW 20 MIN: CPT

## 2023-12-14 ASSESSMENT — PAIN DESCRIPTION - ONSET: ONSET: ON-GOING

## 2023-12-14 ASSESSMENT — PAIN DESCRIPTION - PAIN TYPE: TYPE: CHRONIC PAIN

## 2023-12-14 ASSESSMENT — PAIN DESCRIPTION - ORIENTATION: ORIENTATION: RIGHT

## 2023-12-14 ASSESSMENT — PAIN DESCRIPTION - FREQUENCY: FREQUENCY: CONTINUOUS

## 2023-12-14 ASSESSMENT — PAIN - FUNCTIONAL ASSESSMENT: PAIN_FUNCTIONAL_ASSESSMENT: PREVENTS OR INTERFERES SOME ACTIVE ACTIVITIES AND ADLS

## 2023-12-14 ASSESSMENT — PAIN SCALES - GENERAL: PAINLEVEL_OUTOF10: 10

## 2023-12-14 ASSESSMENT — PAIN DESCRIPTION - LOCATION: LOCATION: LEG

## 2023-12-14 NOTE — PROGRESS NOTES
\Multilayer Compression Wrap   (Not Unna) Below the Knee    NAME:  Kaitlynn Mcmullen  YOB: 1960  MEDICAL RECORD NUMBER:  [id number]  DATE:  12/14/2023    Multilayer compression wrap: Removed old Multilayer wrap if indicated and wash leg with mild soap/water. Applied moisturizing agent to dry skin as needed. Applied primary and secondary dressing as ordered. Applied multilayered dressing below the knee to right lower leg. Instructed patient/caregiver not to remove dressing and to keep it clean and dry. Instructed patient/caregiver on complications to report to provider, such as pain, numbness in toes, heavy drainage, and slippage of dressing. Instructed patient on purpose of compression dressing and on activity and exercise recommendations.       Electronically signed by John Alvarado RN on 12/14/2023 at 10:50 AM

## 2023-12-15 ENCOUNTER — HOSPITAL ENCOUNTER (OUTPATIENT)
Dept: HYPERBARIC MEDICINE | Age: 63
Discharge: HOME | End: 2023-12-15

## 2023-12-15 VITALS
SYSTOLIC BLOOD PRESSURE: 167 MMHG | DIASTOLIC BLOOD PRESSURE: 82 MMHG | TEMPERATURE: 97.4 F | HEART RATE: 83 BPM | RESPIRATION RATE: 16 BRPM

## 2023-12-15 DIAGNOSIS — E11.621 DIABETIC FOOT ULCER WITH OSTEOMYELITIS (HCC): Primary | ICD-10-CM

## 2023-12-15 DIAGNOSIS — E11.69 DIABETIC FOOT ULCER WITH OSTEOMYELITIS (HCC): Primary | ICD-10-CM

## 2023-12-15 DIAGNOSIS — M86.9 DIABETIC FOOT ULCER WITH OSTEOMYELITIS (HCC): Primary | ICD-10-CM

## 2023-12-15 DIAGNOSIS — L97.509 DIABETIC FOOT ULCER WITH OSTEOMYELITIS (HCC): Primary | ICD-10-CM

## 2023-12-15 ASSESSMENT — PAIN DESCRIPTION - PAIN TYPE
TYPE: CHRONIC PAIN
TYPE: CHRONIC PAIN

## 2023-12-15 ASSESSMENT — PAIN SCALES - GENERAL
PAINLEVEL_OUTOF10: 8
PAINLEVEL_OUTOF10: 8

## 2023-12-15 ASSESSMENT — PAIN DESCRIPTION - ORIENTATION
ORIENTATION: RIGHT
ORIENTATION: RIGHT

## 2023-12-15 ASSESSMENT — PAIN DESCRIPTION - FREQUENCY
FREQUENCY: CONTINUOUS
FREQUENCY: CONTINUOUS

## 2023-12-15 ASSESSMENT — PAIN DESCRIPTION - ONSET
ONSET: ON-GOING
ONSET: ON-GOING

## 2023-12-15 ASSESSMENT — PAIN DESCRIPTION - LOCATION
LOCATION: LEG
LOCATION: LEG

## 2023-12-26 ENCOUNTER — HOSPITAL ENCOUNTER (OUTPATIENT)
Dept: WOUND CARE | Age: 63
Discharge: HOME | End: 2023-12-26
Payer: OTHER GOVERNMENT

## 2023-12-26 VITALS
HEART RATE: 81 BPM | TEMPERATURE: 97.9 F | SYSTOLIC BLOOD PRESSURE: 119 MMHG | RESPIRATION RATE: 16 BRPM | DIASTOLIC BLOOD PRESSURE: 74 MMHG

## 2023-12-26 PROCEDURE — 99213 OFFICE O/P EST LOW 20 MIN: CPT

## 2023-12-28 ENCOUNTER — HOSPITAL ENCOUNTER (OUTPATIENT)
Dept: WOUND CARE | Age: 63
Discharge: HOME | End: 2023-12-28
Payer: OTHER GOVERNMENT

## 2023-12-28 VITALS
DIASTOLIC BLOOD PRESSURE: 62 MMHG | SYSTOLIC BLOOD PRESSURE: 146 MMHG | RESPIRATION RATE: 17 BRPM | TEMPERATURE: 98.5 F | HEART RATE: 90 BPM

## 2023-12-28 DIAGNOSIS — E11.69 DIABETIC FOOT ULCER WITH OSTEOMYELITIS (HCC): ICD-10-CM

## 2023-12-28 DIAGNOSIS — I73.9 PAD (PERIPHERAL ARTERY DISEASE) (HCC): ICD-10-CM

## 2023-12-28 DIAGNOSIS — E11.9 DIABETES MELLITUS TREATED WITH INSULIN (HCC): ICD-10-CM

## 2023-12-28 DIAGNOSIS — E11.621 DIABETIC ULCER OF TOE OF RIGHT FOOT ASSOCIATED WITH TYPE 2 DIABETES MELLITUS, WITH FAT LAYER EXPOSED (HCC): ICD-10-CM

## 2023-12-28 DIAGNOSIS — L97.512 DIABETIC ULCER OF TOE OF RIGHT FOOT ASSOCIATED WITH TYPE 2 DIABETES MELLITUS, WITH FAT LAYER EXPOSED (HCC): ICD-10-CM

## 2023-12-28 DIAGNOSIS — L97.509 DIABETIC FOOT ULCER WITH OSTEOMYELITIS (HCC): ICD-10-CM

## 2023-12-28 DIAGNOSIS — E11.621 DIABETIC ULCER OF RIGHT MIDFOOT ASSOCIATED WITH TYPE 2 DIABETES MELLITUS, WITH NECROSIS OF MUSCLE (HCC): Primary | ICD-10-CM

## 2023-12-28 DIAGNOSIS — Z79.4 DIABETES MELLITUS TREATED WITH INSULIN (HCC): ICD-10-CM

## 2023-12-28 DIAGNOSIS — E11.621 DIABETIC ULCER OF TOE OF RIGHT FOOT ASSOCIATED WITH TYPE 2 DIABETES MELLITUS, WITH NECROSIS OF BONE (HCC): ICD-10-CM

## 2023-12-28 DIAGNOSIS — E11.621 DIABETIC FOOT ULCER WITH OSTEOMYELITIS (HCC): ICD-10-CM

## 2023-12-28 DIAGNOSIS — L97.514 DIABETIC ULCER OF TOE OF RIGHT FOOT ASSOCIATED WITH TYPE 2 DIABETES MELLITUS, WITH NECROSIS OF BONE (HCC): ICD-10-CM

## 2023-12-28 DIAGNOSIS — L97.413 DIABETIC ULCER OF RIGHT MIDFOOT ASSOCIATED WITH TYPE 2 DIABETES MELLITUS, WITH NECROSIS OF MUSCLE (HCC): Primary | ICD-10-CM

## 2023-12-28 DIAGNOSIS — M86.9 DIABETIC FOOT ULCER WITH OSTEOMYELITIS (HCC): ICD-10-CM

## 2023-12-28 PROCEDURE — 11042 DBRDMT SUBQ TIS 1ST 20SQCM/<: CPT

## 2023-12-28 PROCEDURE — 11045 DBRDMT SUBQ TISS EACH ADDL: CPT

## 2023-12-28 RX ORDER — TRIAMCINOLONE ACETONIDE 1 MG/G
OINTMENT TOPICAL ONCE
OUTPATIENT
Start: 2023-12-28 | End: 2023-12-28

## 2023-12-28 RX ORDER — LIDOCAINE 50 MG/G
OINTMENT TOPICAL ONCE
OUTPATIENT
Start: 2023-12-28 | End: 2023-12-28

## 2023-12-28 RX ORDER — BACITRACIN ZINC 500 [USP'U]/G
OINTMENT TOPICAL ONCE
OUTPATIENT
Start: 2023-12-28 | End: 2023-12-28

## 2023-12-28 RX ORDER — GENTAMICIN SULFATE 1 MG/G
OINTMENT TOPICAL ONCE
OUTPATIENT
Start: 2023-12-28 | End: 2023-12-28

## 2023-12-28 RX ORDER — LIDOCAINE HYDROCHLORIDE 40 MG/ML
SOLUTION TOPICAL ONCE
OUTPATIENT
Start: 2023-12-28 | End: 2023-12-28

## 2023-12-28 RX ORDER — LIDOCAINE 40 MG/G
CREAM TOPICAL ONCE
OUTPATIENT
Start: 2023-12-28 | End: 2023-12-28

## 2023-12-28 RX ORDER — IBUPROFEN 200 MG
TABLET ORAL ONCE
OUTPATIENT
Start: 2023-12-28 | End: 2023-12-28

## 2023-12-28 RX ORDER — CLOBETASOL PROPIONATE 0.5 MG/G
OINTMENT TOPICAL ONCE
OUTPATIENT
Start: 2023-12-28 | End: 2023-12-28

## 2023-12-28 RX ORDER — BACITRACIN ZINC AND POLYMYXIN B SULFATE 500; 1000 [USP'U]/G; [USP'U]/G
OINTMENT TOPICAL ONCE
OUTPATIENT
Start: 2023-12-28 | End: 2023-12-28

## 2023-12-28 RX ORDER — BETAMETHASONE DIPROPIONATE 0.05 %
OINTMENT (GRAM) TOPICAL ONCE
OUTPATIENT
Start: 2023-12-28 | End: 2023-12-28

## 2023-12-28 RX ORDER — SODIUM CHLOR/HYPOCHLOROUS ACID 0.033 %
SOLUTION, IRRIGATION IRRIGATION ONCE
OUTPATIENT
Start: 2023-12-28 | End: 2023-12-28

## 2023-12-28 NOTE — PATIENT INSTRUCTIONS
PHYSICIAN ORDERS AND DISCHARGE INSTRUCTIONS    Wound cleansing:               Do not scrub or use excessive force. Wash hands with soap and water before and after dressing changes. Prior to applying a clean dressing, cleanse wound with normal saline, wound cleanser, or mild soap and water. Ask the physician or nurse before getting the wound(s) wet in a shower. Wound Care Notes:   Applied for Kerecis grafts   Binh Noon #1 applied to right plantar foot 10/30/23  Binh Noon #2 applied to right plantar foot 11/6/23  Binh Noon #3 applied to right plantar foot 11/13/23  Binh Noon #4 applied to right plantar foot 11/20/23  Binh Noon #5 applied to right plantar foot 11/27/23  Binh Noon #6 applied to right plantar foot 12/5/23  Binh Noon #7 applied to right plantar foot 12/21/23   Donated Binh Noon only, applied to Right Plantar Foot 12/28/23                               Orders for this week: 12/28/23      [facility]: Fax # [phone]     Right Plantar Foot Wound - Wash with soap and water, pat dry. Qwick to periwound. Binh Noon applied using sorbact, mastisol, steristrips, and Versatel (DO NOT REMOVE)   Cover with ca alginate and medium sorbex   Wrap with kerlix and ACE to ankle (Enclose toes). Leave in place for 1 week. Nurse visit on Tuesday  Follow up with Kristen Nixon CNP in 1 week(s) in the wound care center. Thursday   Call [phone] for any questions or concerns.

## 2023-12-28 NOTE — PROGRESS NOTES
Kerecis Omega3 Treatment Note    NAME:  Carmen Javed  YOB: 1960  MEDICAL RECORD NUMBER:  [id number]  DATE:  12/28/2023    Goal:  Patient will receive safe and proper application of skin substitute. Patient will comply with caring for dressing, and reporting complications. The expiration date is checked immediately before use., The package was intact prior to use and no damage was noted., Giancarlo Fendt is stored at room temperature.,  Giancarlo Fendt was hydrated with sterile normal saline per the provider.,  Giancarlo Fendt was removed from protective sterile packaging by the provider and applied to the prepared ulcer bed. ,  Giancarlo Fendt was applied to Right Plantar Foot  , Keresis Omega 3 and affixed with steri-strips by the provider.,  Giancarlo Fendt was covered with non-adherent ulcer dressing. , Applied ca alginate, Sorbex, Kerlix, and ACE over non-adherent., Applied dry gauze and/or roll gauze. , Applied appropriate off-loading device. , The patient/caregiver was instructed not to remove the dressing and to keep it clean and dry., The patient/family/caregiver was instructed on the need for offloading and elevation of the affected extremity and on using the prescribed offloading device. , and The patient/family/caregiver was instructed on signs and symptoms of complication to report, such as draining through dressing, dressing falling down/slipping, getting wet, or severe pain or tingling.  Guidelines followed  Malena Tamela may only be used every 12 months per wound. Date of first application of Kerecis Omega 3 for this current wound is October 30, 2023. Donated Graft applied today.     Electronically signed by Luis Martinez RN on 12/28/2023 at 10:24 AM
was cleansed and prepared as necessary to accept the graft. Debridement was required as above  Having prepared the wound bed, the skin graft was completed as below. Product Utilized:          [] APLIGRAF   []44 sq cm   []88 sq cm    []132 sq cm  []176 sq cm           [] DERMAGRAFT  [] 38 sq cm   []76 sq cm    []114 sq cm  []152 sq cm      [] NUSHIELD  [] 1.6 sq/cm disc   [] (2X3) 6 sq/cm    [] (2X4) 8 sq/cm         [] (3X4) 12 sq/cm  [] (4x4) 16 sq/cm   [] (4X6) 24 sq/cm         [] (6X6) 36 sq/cm        [] AFFINITY    [] (1.5 cm x 1.5cm) 2.25 cm   [] (2.5 cm x 2.5 cm) 6.25 cm         [] THERASKIN  [] 13 sq cm   []37.34 sq cm             [] EpiFix   [] 1.54 sq cm disc    [] 2 pieces (3.08 sq cm)    [] 3  pieces (4.62 sq  cm)   [] 6 sq/cm    [] 16 sq cm     [] 18 sq cm   Fenestrated        [] OASIS   [] 10.5 sq cm   []21 sq cm    []35 sq cm Tri-Matrix  []70 sq cm          Tri-Matrix                    [] PURAPLY   [] 4 sq cm   [] 8 sq cm   [] 25sq cm                  [] Grafix      [] 1.54 sq cm disc    [] 3 sq cm    [] 6 sq cm   [] 12 sq cm   [] 25 sq cm      [x]Kerecis Micro 38 sqcm compassionate care      Skin Substitute Applied:    Performed by: ROBIN Cordon CNP    Consent obtained: Yes    Time out taken: Yes     Fenestrated: Yes    Skin Substitute was Applied to Wound Number(s):     Right foot      Total Surface Area Covered 38 sq/cm    Was the Product Layered  No      Amount Wasted 0 sq/cm    Reason for Waste: material provided was greater than wound size      Secured: Yes    Instruments used to complete placement of graft::#15 blade scalpel and forceps    Secured With:   [] N/A  [x]Steri Strips    []Sutures     []Staples [x]Other Versitel    Procedural Pain: 0/10     Post Procedural Pain: 0 / 10    Response to Treatment:  Well tolerated by patient. Status of wound progress and description from last visit: The most proximal/medial wound looks better, .  The fourth toe site healing,

## 2024-01-02 ENCOUNTER — HOSPITAL ENCOUNTER (OUTPATIENT)
Dept: WOUND CARE | Age: 64
Discharge: HOME | End: 2024-01-02
Payer: OTHER GOVERNMENT

## 2024-01-02 VITALS
HEART RATE: 82 BPM | TEMPERATURE: 97.4 F | RESPIRATION RATE: 18 BRPM | SYSTOLIC BLOOD PRESSURE: 124 MMHG | DIASTOLIC BLOOD PRESSURE: 77 MMHG

## 2024-01-02 PROCEDURE — 99213 OFFICE O/P EST LOW 20 MIN: CPT

## 2024-01-02 ASSESSMENT — PAIN DESCRIPTION - PAIN TYPE: TYPE: ACUTE PAIN

## 2024-01-02 ASSESSMENT — PAIN DESCRIPTION - FREQUENCY: FREQUENCY: CONTINUOUS

## 2024-01-02 ASSESSMENT — PAIN - FUNCTIONAL ASSESSMENT: PAIN_FUNCTIONAL_ASSESSMENT: PREVENTS OR INTERFERES SOME ACTIVE ACTIVITIES AND ADLS

## 2024-01-02 ASSESSMENT — PAIN SCALES - GENERAL: PAINLEVEL_OUTOF10: 9

## 2024-01-02 ASSESSMENT — PAIN DESCRIPTION - ORIENTATION: ORIENTATION: RIGHT

## 2024-01-02 ASSESSMENT — PAIN DESCRIPTION - ONSET: ONSET: ON-GOING

## 2024-01-02 ASSESSMENT — PAIN DESCRIPTION - DESCRIPTORS: DESCRIPTORS: THROBBING

## 2024-01-02 ASSESSMENT — PAIN DESCRIPTION - LOCATION: LOCATION: FOOT;LEG

## 2024-01-02 NOTE — PLAN OF CARE
Problem: Chronic Conditions and Co-morbidities  Goal: Patient's chronic conditions and co-morbidity symptoms are monitored and maintained or improved  Outcome: Progressing

## 2024-01-02 NOTE — PROGRESS NOTES
Treatment to Right Foot completed as per current order. Graft remains in place. Patient tolerated procedure well. Noted new area of concern to right 2nd toe. Notified provider. Patient to be seen in clinic again on Thursday.

## 2024-01-02 NOTE — DISCHARGE INSTR - COC
edges 01/02/24 1035   Wound Thickness Description not for Pressure Injury Full thickness 01/02/24 1035   Number of days: 117       Wound 11/13/23 #2 right fourth toe (Active)   Wound Image   12/28/23 0856   Wound Etiology Diabetic 01/02/24 1035   Dressing Status New dressing applied 01/02/24 1035   Wound Cleansed Soap and water;Wound cleanser 01/02/24 1035   Dressing/Treatment Other (comment) 11/13/23 1459   Offloading for Diabetic Foot Ulcers Post op shoe 01/02/24 1035   Wound Length (cm) 1 cm 12/28/23 0856   Wound Width (cm) 1.1 cm 12/28/23 0856   Wound Depth (cm) 1.7 cm 12/28/23 0856   Wound Surface Area (cm^2) 1.1 cm^2 12/28/23 0856   Change in Wound Size % (l*w) -57.14 12/28/23 0856   Wound Volume (cm^3) 1.87 cm^3 12/28/23 0856   Wound Healing % -2571 12/28/23 0856   Post-Procedure Length (cm) 1 cm 12/28/23 0914   Post-Procedure Width (cm) 1.1 cm 12/28/23 0914   Post-Procedure Depth (cm) 1.7 cm 12/28/23 0914   Post-Procedure Surface Area (cm^2) 1.1 cm^2 12/28/23 0914   Post-Procedure Volume (cm^3) 1.87 cm^3 12/28/23 0914   Distance Tunneling (cm) 0 cm 12/28/23 0856   Tunneling Position ___ O'Clock 0 12/28/23 0856   Undermining Starts ___ O'Clock 0 12/28/23 0856   Undermining Ends___ O'Clock 0 12/28/23 0856   Undermining Maxium Distance (cm) 0 12/28/23 0856   Wound Assessment Eschar moist;Slough;Pink/red 01/02/24 1035   Drainage Amount Large (50-75% saturated) 01/02/24 1035   Drainage Description Serosanguinous;Brown 01/02/24 1035   Odor Mild 01/02/24 1035   Brittni-wound Assessment Maceration;Fragile 01/02/24 1035   Margins Defined edges 01/02/24 1035   Wound Thickness Description not for Pressure Injury Full thickness 01/02/24 1035   Number of days: 49       Wound 01/02/24 #3 Right 2nd Toe Cluster (Active)   Wound Image   01/02/24 1035   Dressing Status New dressing applied 01/02/24 1035   Wound Cleansed Soap and water;Wound cleanser 01/02/24 1035   Offloading for Diabetic Foot Ulcers Post op shoe 01/02/24 1035

## 2024-01-02 NOTE — PATIENT INSTRUCTIONS
PHYSICIAN ORDERS AND DISCHARGE INSTRUCTIONS    Wound cleansing:               Do not scrub or use excessive force.              Wash hands with soap and water before and after dressing changes.  Prior to applying a clean dressing, cleanse wound with normal saline, wound cleanser, or mild soap and water.              Ask the physician or nurse before getting the wound(s) wet in a shower.     Wound Care Notes:   Applied for Kerecis grafts   Kerecis MariGen #1 applied to right plantar foot 10/30/23  Kerecis [employer] #2 applied to right plantar foot 11/6/23  Kerecis [employer]Gen #3 applied to right plantar foot 11/13/23  Kerecis MariGen #4 applied to right plantar foot 11/20/23  Kerecis MariGen #5 applied to right plantar foot 11/27/23  Kerecis MariGen #6 applied to right plantar foot 12/5/23  Kerecis MariGen #7 applied to right plantar foot 12/21/23   Donated Kerecis MariGen only, applied to Right Plantar Foot 12/28/23                               Orders for this week: 01/02/24      [facility] COMPLET[facility]: Fax # [phone]     Right Plantar Foot Wound - Wash with soap and water, pat dry.   Qwick to periwound.  Kerecis Marigen applied using sorbact, mastisol, steristrips, and Versatel (DO NOT REMOVE)   Cover with ca alginate and medium sorbex   Wrap with kerlix and ACE to ankle (Enclose toes).  Leave in place for 1 week.    Nurse visit on Tuesday  Follow up with Edilia Mendiola CNP in 1 week(s) in the wound care center. Thursday   Call  for any questions or concerns.

## 2024-01-03 ENCOUNTER — HOSPITAL ENCOUNTER (OUTPATIENT)
Dept: HYPERBARIC MEDICINE | Age: 64
Discharge: HOME | End: 2024-01-03

## 2024-01-04 ENCOUNTER — HOSPITAL ENCOUNTER (OUTPATIENT)
Dept: WOUND CARE | Age: 64
Discharge: HOME | End: 2024-01-04
Payer: OTHER GOVERNMENT

## 2024-01-04 VITALS
DIASTOLIC BLOOD PRESSURE: 65 MMHG | RESPIRATION RATE: 18 BRPM | SYSTOLIC BLOOD PRESSURE: 147 MMHG | HEART RATE: 86 BPM | TEMPERATURE: 97.4 F

## 2024-01-04 DIAGNOSIS — L97.413 DIABETIC ULCER OF RIGHT MIDFOOT ASSOCIATED WITH TYPE 2 DIABETES MELLITUS, WITH NECROSIS OF MUSCLE (HCC): ICD-10-CM

## 2024-01-04 DIAGNOSIS — E11.621 DIABETIC ULCER OF TOE OF RIGHT FOOT ASSOCIATED WITH TYPE 2 DIABETES MELLITUS, WITH NECROSIS OF BONE (HCC): ICD-10-CM

## 2024-01-04 DIAGNOSIS — L97.509 DIABETIC FOOT ULCER WITH OSTEOMYELITIS (HCC): Primary | ICD-10-CM

## 2024-01-04 DIAGNOSIS — M86.9 DIABETIC FOOT ULCER WITH OSTEOMYELITIS (HCC): Primary | ICD-10-CM

## 2024-01-04 DIAGNOSIS — E11.621 DIABETIC FOOT ULCER WITH OSTEOMYELITIS (HCC): ICD-10-CM

## 2024-01-04 DIAGNOSIS — L97.512 DIABETIC ULCER OF TOE OF RIGHT FOOT ASSOCIATED WITH TYPE 2 DIABETES MELLITUS, WITH FAT LAYER EXPOSED (HCC): ICD-10-CM

## 2024-01-04 DIAGNOSIS — Z79.4 DIABETES MELLITUS TREATED WITH INSULIN (HCC): ICD-10-CM

## 2024-01-04 DIAGNOSIS — E11.9 DIABETES MELLITUS TREATED WITH INSULIN (HCC): ICD-10-CM

## 2024-01-04 DIAGNOSIS — M86.9 DIABETIC FOOT ULCER WITH OSTEOMYELITIS (HCC): ICD-10-CM

## 2024-01-04 DIAGNOSIS — I73.9 PAD (PERIPHERAL ARTERY DISEASE) (HCC): ICD-10-CM

## 2024-01-04 DIAGNOSIS — E11.69 DIABETIC FOOT ULCER WITH OSTEOMYELITIS (HCC): Primary | ICD-10-CM

## 2024-01-04 DIAGNOSIS — L97.514 DIABETIC ULCER OF TOE OF RIGHT FOOT ASSOCIATED WITH TYPE 2 DIABETES MELLITUS, WITH NECROSIS OF BONE (HCC): ICD-10-CM

## 2024-01-04 DIAGNOSIS — E11.621 DIABETIC ULCER OF RIGHT MIDFOOT ASSOCIATED WITH TYPE 2 DIABETES MELLITUS, WITH NECROSIS OF MUSCLE (HCC): ICD-10-CM

## 2024-01-04 DIAGNOSIS — L97.509 DIABETIC FOOT ULCER WITH OSTEOMYELITIS (HCC): ICD-10-CM

## 2024-01-04 DIAGNOSIS — E11.621 DIABETIC ULCER OF TOE OF RIGHT FOOT ASSOCIATED WITH TYPE 2 DIABETES MELLITUS, WITH FAT LAYER EXPOSED (HCC): ICD-10-CM

## 2024-01-04 DIAGNOSIS — E11.69 DIABETIC FOOT ULCER WITH OSTEOMYELITIS (HCC): ICD-10-CM

## 2024-01-04 DIAGNOSIS — E11.621 DIABETIC FOOT ULCER WITH OSTEOMYELITIS (HCC): Primary | ICD-10-CM

## 2024-01-04 PROCEDURE — 11045 DBRDMT SUBQ TISS EACH ADDL: CPT

## 2024-01-04 PROCEDURE — 11042 DBRDMT SUBQ TIS 1ST 20SQCM/<: CPT

## 2024-01-04 PROCEDURE — 11043 DBRDMT MUSC&/FSCA 1ST 20/<: CPT

## 2024-01-04 RX ORDER — TRIAMCINOLONE ACETONIDE 1 MG/G
OINTMENT TOPICAL ONCE
OUTPATIENT
Start: 2024-01-04 | End: 2024-01-04

## 2024-01-04 RX ORDER — LIDOCAINE HYDROCHLORIDE 40 MG/ML
SOLUTION TOPICAL ONCE
OUTPATIENT
Start: 2024-01-04 | End: 2024-01-04

## 2024-01-04 RX ORDER — LIDOCAINE 40 MG/G
CREAM TOPICAL ONCE
OUTPATIENT
Start: 2024-01-04 | End: 2024-01-04

## 2024-01-04 RX ORDER — LIDOCAINE 50 MG/G
OINTMENT TOPICAL ONCE
OUTPATIENT
Start: 2024-01-04 | End: 2024-01-04

## 2024-01-04 RX ORDER — SODIUM CHLOR/HYPOCHLOROUS ACID 0.033 %
SOLUTION, IRRIGATION IRRIGATION ONCE
OUTPATIENT
Start: 2024-01-04 | End: 2024-01-04

## 2024-01-04 RX ORDER — GENTAMICIN SULFATE 1 MG/G
OINTMENT TOPICAL ONCE
OUTPATIENT
Start: 2024-01-04 | End: 2024-01-04

## 2024-01-04 RX ORDER — BACITRACIN ZINC AND POLYMYXIN B SULFATE 500; 1000 [USP'U]/G; [USP'U]/G
OINTMENT TOPICAL ONCE
OUTPATIENT
Start: 2024-01-04 | End: 2024-01-04

## 2024-01-04 RX ORDER — BETAMETHASONE DIPROPIONATE 0.05 %
OINTMENT (GRAM) TOPICAL ONCE
OUTPATIENT
Start: 2024-01-04 | End: 2024-01-04

## 2024-01-04 RX ORDER — BACITRACIN ZINC 500 [USP'U]/G
OINTMENT TOPICAL ONCE
OUTPATIENT
Start: 2024-01-04 | End: 2024-01-04

## 2024-01-04 RX ORDER — CLOBETASOL PROPIONATE 0.5 MG/G
OINTMENT TOPICAL ONCE
OUTPATIENT
Start: 2024-01-04 | End: 2024-01-04

## 2024-01-04 RX ORDER — IBUPROFEN 200 MG
TABLET ORAL ONCE
OUTPATIENT
Start: 2024-01-04 | End: 2024-01-04

## 2024-01-04 ASSESSMENT — PAIN SCALES - GENERAL: PAINLEVEL_OUTOF10: 0

## 2024-01-04 NOTE — PROGRESS NOTES
11/27/23  Kerecis MariGen #6 applied to right plantar foot 12/5/23  Kerecis MariGen #7 applied to right plantar foot 12/21/23   Donated Kerecis MariGen only, applied to Right Plantar Foot 12/28/23  Report to Infectious disease at [facility]: Edilia expressed concerns for need of IV antibiotics.                              Orders for this week: 01/04/24      FAX COMPLETED [facility]: Fax # [phone]     Right Plantar Foot Wound - Wash with soap and water, pat dry.   Pack and apply to wound bed of all wounds with gentamicin and stimulen powder coated sorbact  Cover with medium sorbex   Wrap with kerlix and ACE to ankle (Enclose toes).  Leave in place for 1 week.    Nurse visit  Follow up with Edilia Mendiola CNP in 1 week(s) in the wound care center. Thursday   Call  for any questions or concerns.     Electronically signed by ROBIN Jackson CNP on 1/4/2024 at 12:41 PM

## 2024-01-04 NOTE — PATIENT INSTRUCTIONS
PHYSICIAN ORDERS AND DISCHARGE INSTRUCTIONS    Wound cleansing:               Do not scrub or use excessive force.              Wash hands with soap and water before and after dressing changes.  Prior to applying a clean dressing, cleanse wound with normal saline, wound cleanser, or mild soap and water.              Ask the physician or nurse before getting the wound(s) wet in a shower.     Wound Care Notes:   Applied for Kerecis grafts   Kerecis MariGen #1 applied to right plantar foot 10/30/23  Kerecis MariGen #2 applied to right plantar foot 11/6/23  Kerecis MariGen #3 applied to right plantar foot 11/13/23  Kerecis MariGen #4 applied to right plantar foot 11/20/23  Kerecis MariGen #5 applied to right plantar foot 11/27/23  Kerecis MariGen #6 applied to right plantar foot 12/5/23  Kerecis MariGen #7 applied to right plantar foot 12/21/23   Donated Kerecis MariGen only, applied to Right Plantar Foot 12/28/23  Report to Infectious disease at [facility]: Edilia expressed concerns for need of IV antibiotics.                              Orders for this week: 01/04/24      FAX COMPLETED [facility]: Fax # [phone]     Right Plantar Foot Wound - Wash with soap and water, pat dry.   Pack and apply to wound bed of all wounds with gentamicin and stimulen powder coated sorbact  Cover with medium sorbex   Wrap with kerlix and ACE to ankle (Enclose toes).  Leave in place for 1 week.    Nurse visit  Follow up with Edilia Mendiola CNP in 1 week(s) in the wound care center. Thursday   Call  for any questions or concerns.

## 2024-01-12 ENCOUNTER — HOSPITAL ENCOUNTER (OUTPATIENT)
Dept: WOUND CARE | Age: 64
Discharge: HOME | End: 2024-01-12
Payer: OTHER GOVERNMENT

## 2024-01-12 ENCOUNTER — HOSPITAL ENCOUNTER (OUTPATIENT)
Dept: HYPERBARIC MEDICINE | Age: 64
Discharge: HOME | End: 2024-01-12
Payer: OTHER GOVERNMENT

## 2024-01-12 VITALS
TEMPERATURE: 97.8 F | SYSTOLIC BLOOD PRESSURE: 173 MMHG | DIASTOLIC BLOOD PRESSURE: 91 MMHG | RESPIRATION RATE: 16 BRPM | HEART RATE: 86 BPM

## 2024-01-12 VITALS
HEART RATE: 66 BPM | TEMPERATURE: 97.4 F | RESPIRATION RATE: 20 BRPM | SYSTOLIC BLOOD PRESSURE: 137 MMHG | DIASTOLIC BLOOD PRESSURE: 65 MMHG

## 2024-01-12 DIAGNOSIS — E11.9 DIABETES MELLITUS TREATED WITH INSULIN (HCC): ICD-10-CM

## 2024-01-12 DIAGNOSIS — L97.413 DIABETIC ULCER OF RIGHT MIDFOOT ASSOCIATED WITH TYPE 2 DIABETES MELLITUS, WITH NECROSIS OF MUSCLE (HCC): ICD-10-CM

## 2024-01-12 DIAGNOSIS — E11.69 DIABETIC FOOT ULCER WITH OSTEOMYELITIS (HCC): Primary | ICD-10-CM

## 2024-01-12 DIAGNOSIS — E11.621 DIABETIC FOOT ULCER WITH OSTEOMYELITIS (HCC): Primary | ICD-10-CM

## 2024-01-12 DIAGNOSIS — L97.509 DIABETIC FOOT ULCER WITH OSTEOMYELITIS (HCC): Primary | ICD-10-CM

## 2024-01-12 DIAGNOSIS — M86.9 DIABETIC FOOT ULCER WITH OSTEOMYELITIS (HCC): Primary | ICD-10-CM

## 2024-01-12 DIAGNOSIS — E11.621 DIABETIC ULCER OF RIGHT MIDFOOT ASSOCIATED WITH TYPE 2 DIABETES MELLITUS, WITH NECROSIS OF MUSCLE (HCC): ICD-10-CM

## 2024-01-12 DIAGNOSIS — I73.9 PAD (PERIPHERAL ARTERY DISEASE) (HCC): ICD-10-CM

## 2024-01-12 DIAGNOSIS — Z79.4 DIABETES MELLITUS TREATED WITH INSULIN (HCC): ICD-10-CM

## 2024-01-12 PROCEDURE — 99213 OFFICE O/P EST LOW 20 MIN: CPT

## 2024-01-12 PROCEDURE — G0277 HBOT, FULL BODY CHAMBER, 30M: HCPCS

## 2024-01-12 NOTE — PROGRESS NOTES
Discussed with patient ROCIO Yeboah would like for him to follow up with  Dr. Li next Wednesday at [facility] , also discussed the importance of coming to HBO appointments . Patient agreed to see Dr. Li next Wednesday 1/17/24 @ 1030 and also agreed to come for HBO next week;  all appointments written down and given to patient . Patient mentioned may have to work around [facility] if [facility] are able to get [facility] ; SN advised would accommodate as best possible ,may have some flexibility, however other HBO patients are scheduled.

## 2024-01-12 NOTE — PROGRESS NOTES
[facility]  Hyperbaric Oxygen Therapy   Progress Note      NAME: Devon Morgan   MEDICAL RECORD NUMBER:  [id number]  AGE: 63 y.o.   GENDER: male  : 1960  EPISODE DATE:  2024     Subjective     HBO Treatment Number: 62 out of Total Treatments: 80    HBO Diagnosis:     Indications: Lower Extremity Diabetic Wound ___(site) (Right foot)  Morejon: Morejon 3     Safety checks performed prior to treatment.  See doc flowsheets for documentation.    Objective           No results for input(s): \"POCGLU\" in the last 72 hours.    Pre treatment Vital Signs       Temp: 97.4 °F (36.3 °C) (VS obtained during clinic visit before HBO, re entered to flow over to HBO provider note)     Pulse: 66     Respirations: 20     BP: 137/65     Blood Sugar N/A     Post treatment Vital Signs  Temp: 97.8 °F (36.6 °C)  Pulse: 86  Respirations: 16  BP: (!) 173/91  Blood Sugar N/A    Assessment        Physical Exam:  General Appearance:  alert and oriented to person, place and time, well-developed and well-nourished, in no acute distress    Pre Tympanic Membrane Assessment:  tympanic membranes intact bilaterally, normal color    Post Tympanic Membrane Assessment:  Right: Normal (No complaints per Pt.)  Left: Normal (No complaints per Pt.)    Pulmonary/Chest:  clear to auscultation bilaterally- no wheezes, rales or rhonchi, normal air movement, no respiratory distress and no chest wall tenderness    Cardiovascular:  normal, regular rate and rhythm    Chamber #: [id number]       Treatment Start Time: 0843     Pressure Reached Time: 0851  EDOUARD : 2  Number of Air Breaks:  Treatment Status: No Air break      Decompression Time: 1001   Treatment End Time: 1012    Symptoms Noted During Treatment: None    Adverse Event: no      Total time in chamber:  Minutes at depth:     I was present on these premises and immediately available to furnish assistance & direction throughout the procedure.       Plan          Devon

## 2024-01-16 ENCOUNTER — HOSPITAL ENCOUNTER (OUTPATIENT)
Dept: WOUND CARE | Age: 64
Discharge: HOME | End: 2024-01-16
Payer: OTHER GOVERNMENT

## 2024-01-16 ENCOUNTER — HOSPITAL ENCOUNTER (OUTPATIENT)
Dept: HYPERBARIC MEDICINE | Age: 64
Discharge: HOME | End: 2024-01-16
Payer: OTHER GOVERNMENT

## 2024-01-16 VITALS
TEMPERATURE: 97.4 F | HEART RATE: 86 BPM | RESPIRATION RATE: 20 BRPM | SYSTOLIC BLOOD PRESSURE: 160 MMHG | DIASTOLIC BLOOD PRESSURE: 79 MMHG

## 2024-01-16 DIAGNOSIS — E11.621 DIABETIC ULCER OF RIGHT MIDFOOT ASSOCIATED WITH TYPE 2 DIABETES MELLITUS, WITH NECROSIS OF MUSCLE (HCC): ICD-10-CM

## 2024-01-16 DIAGNOSIS — I73.9 PAD (PERIPHERAL ARTERY DISEASE) (HCC): ICD-10-CM

## 2024-01-16 DIAGNOSIS — E11.9 DIABETES MELLITUS TREATED WITH INSULIN (HCC): ICD-10-CM

## 2024-01-16 DIAGNOSIS — L97.509 DIABETIC FOOT ULCER WITH OSTEOMYELITIS (HCC): Primary | ICD-10-CM

## 2024-01-16 DIAGNOSIS — L97.413 DIABETIC ULCER OF RIGHT MIDFOOT ASSOCIATED WITH TYPE 2 DIABETES MELLITUS, WITH NECROSIS OF MUSCLE (HCC): ICD-10-CM

## 2024-01-16 DIAGNOSIS — E11.69 DIABETIC FOOT ULCER WITH OSTEOMYELITIS (HCC): Primary | ICD-10-CM

## 2024-01-16 DIAGNOSIS — M86.9 DIABETIC FOOT ULCER WITH OSTEOMYELITIS (HCC): Primary | ICD-10-CM

## 2024-01-16 DIAGNOSIS — E11.621 DIABETIC ULCER OF TOE OF RIGHT FOOT ASSOCIATED WITH TYPE 2 DIABETES MELLITUS, WITH FAT LAYER EXPOSED (HCC): ICD-10-CM

## 2024-01-16 DIAGNOSIS — E11.621 DIABETIC FOOT ULCER WITH OSTEOMYELITIS (HCC): Primary | ICD-10-CM

## 2024-01-16 DIAGNOSIS — L97.514 DIABETIC ULCER OF TOE OF RIGHT FOOT ASSOCIATED WITH TYPE 2 DIABETES MELLITUS, WITH NECROSIS OF BONE (HCC): ICD-10-CM

## 2024-01-16 DIAGNOSIS — L97.512 DIABETIC ULCER OF TOE OF RIGHT FOOT ASSOCIATED WITH TYPE 2 DIABETES MELLITUS, WITH FAT LAYER EXPOSED (HCC): ICD-10-CM

## 2024-01-16 DIAGNOSIS — Z79.4 DIABETES MELLITUS TREATED WITH INSULIN (HCC): ICD-10-CM

## 2024-01-16 DIAGNOSIS — E11.621 DIABETIC ULCER OF TOE OF RIGHT FOOT ASSOCIATED WITH TYPE 2 DIABETES MELLITUS, WITH NECROSIS OF BONE (HCC): ICD-10-CM

## 2024-01-16 PROCEDURE — 11042 DBRDMT SUBQ TIS 1ST 20SQCM/<: CPT

## 2024-01-16 PROCEDURE — G0277 HBOT, FULL BODY CHAMBER, 30M: HCPCS

## 2024-01-16 PROCEDURE — 11045 DBRDMT SUBQ TISS EACH ADDL: CPT

## 2024-01-16 RX ORDER — LIDOCAINE HYDROCHLORIDE 40 MG/ML
SOLUTION TOPICAL ONCE
OUTPATIENT
Start: 2024-01-16 | End: 2024-01-16

## 2024-01-16 RX ORDER — BACITRACIN ZINC AND POLYMYXIN B SULFATE 500; 1000 [USP'U]/G; [USP'U]/G
OINTMENT TOPICAL ONCE
OUTPATIENT
Start: 2024-01-16 | End: 2024-01-16

## 2024-01-16 RX ORDER — BACITRACIN ZINC 500 [USP'U]/G
OINTMENT TOPICAL ONCE
OUTPATIENT
Start: 2024-01-16 | End: 2024-01-16

## 2024-01-16 RX ORDER — LIDOCAINE 40 MG/G
CREAM TOPICAL ONCE
OUTPATIENT
Start: 2024-01-16 | End: 2024-01-16

## 2024-01-16 RX ORDER — GENTAMICIN SULFATE 1 MG/G
OINTMENT TOPICAL ONCE
OUTPATIENT
Start: 2024-01-16 | End: 2024-01-16

## 2024-01-16 RX ORDER — CLOBETASOL PROPIONATE 0.5 MG/G
OINTMENT TOPICAL ONCE
OUTPATIENT
Start: 2024-01-16 | End: 2024-01-16

## 2024-01-16 RX ORDER — IBUPROFEN 200 MG
TABLET ORAL ONCE
OUTPATIENT
Start: 2024-01-16 | End: 2024-01-16

## 2024-01-16 RX ORDER — SODIUM CHLOR/HYPOCHLOROUS ACID 0.033 %
SOLUTION, IRRIGATION IRRIGATION ONCE
OUTPATIENT
Start: 2024-01-16 | End: 2024-01-16

## 2024-01-16 RX ORDER — TRIAMCINOLONE ACETONIDE 1 MG/G
OINTMENT TOPICAL ONCE
OUTPATIENT
Start: 2024-01-16 | End: 2024-01-16

## 2024-01-16 RX ORDER — LIDOCAINE 50 MG/G
OINTMENT TOPICAL ONCE
OUTPATIENT
Start: 2024-01-16 | End: 2024-01-16

## 2024-01-16 RX ORDER — BETAMETHASONE DIPROPIONATE 0.05 %
OINTMENT (GRAM) TOPICAL ONCE
OUTPATIENT
Start: 2024-01-16 | End: 2024-01-16

## 2024-01-16 ASSESSMENT — PAIN DESCRIPTION - LOCATION
LOCATION: FOOT
LOCATION: FOOT
LOCATION: OTHER (COMMENT);FOOT

## 2024-01-16 ASSESSMENT — PAIN DESCRIPTION - FREQUENCY
FREQUENCY: CONTINUOUS

## 2024-01-16 ASSESSMENT — PAIN DESCRIPTION - ONSET
ONSET: ON-GOING

## 2024-01-16 ASSESSMENT — PAIN DESCRIPTION - PAIN TYPE
TYPE: ACUTE PAIN

## 2024-01-16 ASSESSMENT — PAIN - FUNCTIONAL ASSESSMENT
PAIN_FUNCTIONAL_ASSESSMENT: PREVENTS OR INTERFERES SOME ACTIVE ACTIVITIES AND ADLS

## 2024-01-16 ASSESSMENT — PAIN DESCRIPTION - ORIENTATION
ORIENTATION: RIGHT

## 2024-01-16 ASSESSMENT — PAIN DESCRIPTION - DESCRIPTORS
DESCRIPTORS: THROBBING
DESCRIPTORS: THROBBING
DESCRIPTORS: THROBBING;ACHING

## 2024-01-16 ASSESSMENT — PAIN SCALES - GENERAL
PAINLEVEL_OUTOF10: 5

## 2024-01-16 NOTE — PATIENT INSTRUCTIONS
PHYSICIAN ORDERS AND DISCHARGE INSTRUCTIONS    Wound cleansing:               Do not scrub or use excessive force.              Wash hands with soap and water before and after dressing changes.  Prior to applying a clean dressing, cleanse wound with normal saline, wound cleanser, or mild soap and water.              Ask the physician or nurse before getting the wound(s) wet in a shower.     Wound Care Notes:   Applied for Kerecis grafts   Donated Kerecis [employer] only, applied to Right Plantar Foot 12/28/23  Kerecis [employer] #1 applied to right plantar foot 10/30/23  Kerecis [employer] #2 applied to right plantar foot 11/6/23  Kerecis [employer] #3 applied to right plantar foot 11/13/23  Kerecis [employer] #4 applied to right plantar foot 11/20/23  Kerecis [employer] #5 applied to right plantar foot 11/27/23 Kerecis [employer]Gen #6 applied to right plantar foot 12/5/23  Kerecis [employer]Gen #7 applied to right plantar foot 12/21/23                             Orders for this week: 01/16/24      [facility] COMPLET[facility]: Fax # [phone]     Right Plantar Foot Wound - Wash with soap and water, pat dry.   Apply Sorbact to wound bed. Ca alginate between toes.  Cover with medium sorbex   Wrap with conform and ACE to ankle (Enclose toes).  Leave in place for 1 week.      Nurse visit  Follow up with Edilia Mendiola CNP in 1 week(s) in the wound care center. Thursday: Dr Li Wednesday Next Week   Call  for any questions or concerns.

## 2024-01-17 ENCOUNTER — HOSPITAL ENCOUNTER (OUTPATIENT)
Dept: HYPERBARIC MEDICINE | Age: 64
Discharge: HOME | End: 2024-01-17

## 2024-01-17 NOTE — PROGRESS NOTES
hyperbaric oxygen treatment at [facility].    In my clinical judgement, ongoing HBO therapy is  necessary at this time, given a threat to patient function, limb or life from the current condition.      Supervision and attendance of Hyperbaric Oxygen Therapy provided.  Continue HBO treatment as outlined in the treatment plan.    Hyperbaric Oxygen: Devon Morgan tolerated Treatment Number: 63 well today without complications.     Electronically signed by ROBIN Jackson CNP on 1/16/2024 at 4:33 PM

## 2024-01-17 NOTE — PROGRESS NOTES
Minimal    Hemostasis Achieved:  by pressure    Procedural Pain:  0  / 10     Post Procedural Pain:  0 / 10     Response to treatment:  Well tolerated by patient.     Status of wound progress and description from last visit: The wound is worse he has involvement of multiple toes. He has necrosis of the third toe. The right fourth toe skin was released a few weeks ago and the toe was sent for pathology and culture, resulting acute osteomyelitis per pathology results and heavy growth Klebsiella oxytoca.He is currently on Cipro [facility] (he had been on IV antibiotics for several weeks prior to switching to oral). I feel he would benefit more for IV antibiotics with resulting acute osteomyelitis and this was discussed with [facility] at [facility] but they feel oral coverage is adequate. All bone and tendon covered on the distal portion of the wound he was initially referred to the wound clinic for treatment. The wounds have multiple etiologies limiting healing to include diabetes, PVD and infection. He will need expedited repeat arterial evaluation, IV antibiotics and surgical intervention if we have any chance to save his foot. Had angiogram prior to referral to the wound clinic). [facility] for arterial status and potential intervention per IR. Regimen discussed and established with patient, started grafting 10/30/23 with Kerecis, we grafted a total of 5 times. Discussed diabetic management, off loading and importance of compliance with treatment modalities. We have discussed at length the importance of off loading for his wound to progress. The wound has delayed healing, the patient limits treatment options and wound progress by refusing TCC, off loading shoe and routine blood sugar checks with HBO, he has also missed multiple HBO appointments ( only had 6 HBO treatment 12/23). The patients records were reviewed and discussed.  Time was given for questions . All questions were

## 2024-01-18 ENCOUNTER — HOSPITAL ENCOUNTER (OUTPATIENT)
Dept: HYPERBARIC MEDICINE | Age: 64
Discharge: HOME | End: 2024-01-18
Payer: OTHER GOVERNMENT

## 2024-01-18 ENCOUNTER — HOSPITAL ENCOUNTER (OUTPATIENT)
Dept: WOUND CARE | Age: 64
Discharge: HOME | End: 2024-01-18
Payer: OTHER GOVERNMENT

## 2024-01-18 VITALS
TEMPERATURE: 97.3 F | SYSTOLIC BLOOD PRESSURE: 142 MMHG | DIASTOLIC BLOOD PRESSURE: 69 MMHG | HEART RATE: 93 BPM | RESPIRATION RATE: 16 BRPM

## 2024-01-18 DIAGNOSIS — E11.621 DIABETIC FOOT ULCER WITH OSTEOMYELITIS (HCC): Primary | ICD-10-CM

## 2024-01-18 DIAGNOSIS — L97.509 DIABETIC FOOT ULCER WITH OSTEOMYELITIS (HCC): Primary | ICD-10-CM

## 2024-01-18 DIAGNOSIS — E11.69 DIABETIC FOOT ULCER WITH OSTEOMYELITIS (HCC): Primary | ICD-10-CM

## 2024-01-18 DIAGNOSIS — M86.9 DIABETIC FOOT ULCER WITH OSTEOMYELITIS (HCC): Primary | ICD-10-CM

## 2024-01-18 PROCEDURE — G0277 HBOT, FULL BODY CHAMBER, 30M: HCPCS

## 2024-01-18 PROCEDURE — 99213 OFFICE O/P EST LOW 20 MIN: CPT

## 2024-01-18 ASSESSMENT — PAIN DESCRIPTION - ORIENTATION
ORIENTATION: OTHER (COMMENT)
ORIENTATION: OTHER (COMMENT)

## 2024-01-18 ASSESSMENT — PAIN DESCRIPTION - LOCATION
LOCATION: OTHER (COMMENT)
LOCATION: OTHER (COMMENT)

## 2024-01-18 ASSESSMENT — PAIN DESCRIPTION - PAIN TYPE
TYPE: ACUTE PAIN
TYPE: ACUTE PAIN

## 2024-01-18 ASSESSMENT — PAIN DESCRIPTION - DESCRIPTORS
DESCRIPTORS: ACHING
DESCRIPTORS: ACHING

## 2024-01-18 ASSESSMENT — PAIN DESCRIPTION - ONSET
ONSET: ON-GOING
ONSET: ON-GOING

## 2024-01-18 ASSESSMENT — PAIN SCALES - GENERAL
PAINLEVEL_OUTOF10: 5
PAINLEVEL_OUTOF10: 5

## 2024-01-18 ASSESSMENT — PAIN DESCRIPTION - FREQUENCY
FREQUENCY: CONTINUOUS
FREQUENCY: CONTINUOUS

## 2024-01-19 NOTE — PROGRESS NOTES
[facility]  Hyperbaric Oxygen Therapy   Progress Note      NAME: Devon Morgan   MEDICAL RECORD NUMBER:  [id number]  AGE: 63 y.o.   GENDER: male  : 1960  EPISODE DATE:  2024     Subjective     HBO Treatment Number: 64 out of Total Treatments: 80    HBO Diagnosis:     Indications: Lower Extremity Diabetic Wound ___(site) (right foot)  Morejon: Morejon 3     Safety checks performed prior to treatment.  See doc flowsheets for documentation.    Objective           No results for input(s): \"POCGLU\" in the last 72 hours.    Pre treatment Vital Signs       Temp: 97.5 °F (36.4 °C)     Pulse: 93     Respirations: 16     BP: (!) 94/55     Blood Sugar Refused    Post treatment Vital Signs  Temp: 97.3 °F (36.3 °C)  Pulse: 93  Respirations: 16  BP: (!) 142/69 (Noted to provider)  Blood Sugar Refused    Assessment        Physical Exam:  General Appearance:  alert and oriented to person, place and time, well-developed and well-nourished, in no acute distress    Pre Tympanic Membrane Assessment:  tympanic membranes intact bilaterally, normal color    Post Tympanic Membrane Assessment:  Right: Normal (No complaints per Pt.)  Left: Normal (No complaints per Pt.)    Pulmonary/Chest:  clear to auscultation bilaterally- no wheezes, rales or rhonchi, normal air movement, no respiratory distress    Cardiovascular:  normal, regular rate and rhythm    Chamber #: [id number]       Treatment Start Time: 1208     Pressure Reached Time: 1217  EDOUARD : 2  Number of Air Breaks:  Treatment Status: No Air break      Decompression Time: 1347   Treatment End Time: 1355    Symptoms Noted During Treatment: None    Adverse Event: no      Total time in chamber: 107  Minutes at depth: 90    I was present on these premises and immediately available to furnish assistance & direction throughout the procedure.       Plan          Devon Morgan is a 63 y.o. male  did successfully complete today's hyperbaric oxygen treatment

## 2024-01-22 ENCOUNTER — HOSPITAL ENCOUNTER (OUTPATIENT)
Dept: HYPERBARIC MEDICINE | Age: 64
Discharge: HOME | End: 2024-01-22
Payer: OTHER GOVERNMENT

## 2024-01-22 ENCOUNTER — HOSPITAL ENCOUNTER (OUTPATIENT)
Dept: WOUND CARE | Age: 64
Discharge: HOME | End: 2024-01-22
Attending: NURSE PRACTITIONER
Payer: OTHER GOVERNMENT

## 2024-01-22 VITALS
HEART RATE: 78 BPM | TEMPERATURE: 97.6 F | DIASTOLIC BLOOD PRESSURE: 82 MMHG | SYSTOLIC BLOOD PRESSURE: 174 MMHG | RESPIRATION RATE: 16 BRPM

## 2024-01-22 DIAGNOSIS — E11.9 DIABETES MELLITUS TREATED WITH INSULIN (HCC): ICD-10-CM

## 2024-01-22 DIAGNOSIS — E11.69 DIABETIC FOOT ULCER WITH OSTEOMYELITIS (HCC): Primary | ICD-10-CM

## 2024-01-22 DIAGNOSIS — Z79.4 DIABETES MELLITUS TREATED WITH INSULIN (HCC): ICD-10-CM

## 2024-01-22 DIAGNOSIS — L97.413 DIABETIC ULCER OF RIGHT MIDFOOT ASSOCIATED WITH TYPE 2 DIABETES MELLITUS, WITH NECROSIS OF MUSCLE (HCC): ICD-10-CM

## 2024-01-22 DIAGNOSIS — M86.9 DIABETIC FOOT ULCER WITH OSTEOMYELITIS (HCC): Primary | ICD-10-CM

## 2024-01-22 DIAGNOSIS — E11.621 DIABETIC ULCER OF RIGHT MIDFOOT ASSOCIATED WITH TYPE 2 DIABETES MELLITUS, WITH NECROSIS OF MUSCLE (HCC): ICD-10-CM

## 2024-01-22 DIAGNOSIS — E11.621 DIABETIC FOOT ULCER WITH OSTEOMYELITIS (HCC): Primary | ICD-10-CM

## 2024-01-22 DIAGNOSIS — L97.509 DIABETIC FOOT ULCER WITH OSTEOMYELITIS (HCC): Primary | ICD-10-CM

## 2024-01-22 DIAGNOSIS — E11.621 DIABETIC ULCER OF TOE OF RIGHT FOOT ASSOCIATED WITH TYPE 2 DIABETES MELLITUS, WITH FAT LAYER EXPOSED (HCC): ICD-10-CM

## 2024-01-22 DIAGNOSIS — L97.512 DIABETIC ULCER OF TOE OF RIGHT FOOT ASSOCIATED WITH TYPE 2 DIABETES MELLITUS, WITH FAT LAYER EXPOSED (HCC): ICD-10-CM

## 2024-01-22 DIAGNOSIS — I73.9 PAD (PERIPHERAL ARTERY DISEASE) (HCC): ICD-10-CM

## 2024-01-22 PROBLEM — L97.514 DIABETIC ULCER OF TOE OF RIGHT FOOT ASSOCIATED WITH TYPE 2 DIABETES MELLITUS, WITH NECROSIS OF BONE (HCC): Status: RESOLVED | Noted: 2023-12-05 | Resolved: 2024-01-22

## 2024-01-22 LAB — GLUCOSE BLD-MCNC: 223 MG/DL (ref 70–99)

## 2024-01-22 PROCEDURE — 11042 DBRDMT SUBQ TIS 1ST 20SQCM/<: CPT

## 2024-01-22 PROCEDURE — G0277 HBOT, FULL BODY CHAMBER, 30M: HCPCS

## 2024-01-22 PROCEDURE — 11045 DBRDMT SUBQ TISS EACH ADDL: CPT

## 2024-01-22 PROCEDURE — 82962 GLUCOSE BLOOD TEST: CPT

## 2024-01-22 RX ORDER — LIDOCAINE HYDROCHLORIDE 40 MG/ML
SOLUTION TOPICAL ONCE
Status: CANCELLED | OUTPATIENT
Start: 2024-01-22 | End: 2024-01-22

## 2024-01-22 RX ORDER — SODIUM CHLOR/HYPOCHLOROUS ACID 0.033 %
SOLUTION, IRRIGATION IRRIGATION ONCE
Status: CANCELLED | OUTPATIENT
Start: 2024-01-22 | End: 2024-01-22

## 2024-01-22 RX ORDER — IBUPROFEN 200 MG
TABLET ORAL ONCE
Status: CANCELLED | OUTPATIENT
Start: 2024-01-22 | End: 2024-01-22

## 2024-01-22 RX ORDER — BACITRACIN ZINC 500 [USP'U]/G
OINTMENT TOPICAL ONCE
Status: CANCELLED | OUTPATIENT
Start: 2024-01-22 | End: 2024-01-22

## 2024-01-22 RX ORDER — LIDOCAINE 40 MG/G
CREAM TOPICAL ONCE
Status: CANCELLED | OUTPATIENT
Start: 2024-01-22 | End: 2024-01-22

## 2024-01-22 RX ORDER — BETAMETHASONE DIPROPIONATE 0.05 %
OINTMENT (GRAM) TOPICAL ONCE
Status: CANCELLED | OUTPATIENT
Start: 2024-01-22 | End: 2024-01-22

## 2024-01-22 RX ORDER — BACITRACIN ZINC AND POLYMYXIN B SULFATE 500; 1000 [USP'U]/G; [USP'U]/G
OINTMENT TOPICAL ONCE
Status: CANCELLED | OUTPATIENT
Start: 2024-01-22 | End: 2024-01-22

## 2024-01-22 RX ORDER — CLOBETASOL PROPIONATE 0.5 MG/G
OINTMENT TOPICAL ONCE
Status: CANCELLED | OUTPATIENT
Start: 2024-01-22 | End: 2024-01-22

## 2024-01-22 RX ORDER — LIDOCAINE 50 MG/G
OINTMENT TOPICAL ONCE
Status: CANCELLED | OUTPATIENT
Start: 2024-01-22 | End: 2024-01-22

## 2024-01-22 RX ORDER — GENTAMICIN SULFATE 1 MG/G
OINTMENT TOPICAL ONCE
Status: CANCELLED | OUTPATIENT
Start: 2024-01-22 | End: 2024-01-22

## 2024-01-22 RX ORDER — TRIAMCINOLONE ACETONIDE 1 MG/G
OINTMENT TOPICAL ONCE
Status: CANCELLED | OUTPATIENT
Start: 2024-01-22 | End: 2024-01-22

## 2024-01-22 ASSESSMENT — PAIN DESCRIPTION - ORIENTATION
ORIENTATION: RIGHT;LOWER
ORIENTATION: RIGHT;LOWER

## 2024-01-22 ASSESSMENT — PAIN DESCRIPTION - LOCATION
LOCATION: LEG
LOCATION: LEG

## 2024-01-22 ASSESSMENT — PAIN DESCRIPTION - PAIN TYPE
TYPE: ACUTE PAIN
TYPE: ACUTE PAIN

## 2024-01-22 ASSESSMENT — PAIN SCALES - GENERAL
PAINLEVEL_OUTOF10: 8
PAINLEVEL_OUTOF10: 8

## 2024-01-22 ASSESSMENT — PAIN DESCRIPTION - ONSET
ONSET: ON-GOING
ONSET: ON-GOING

## 2024-01-22 ASSESSMENT — PAIN DESCRIPTION - DESCRIPTORS
DESCRIPTORS: THROBBING
DESCRIPTORS: THROBBING

## 2024-01-22 ASSESSMENT — PAIN DESCRIPTION - FREQUENCY: FREQUENCY: CONTINUOUS

## 2024-01-22 NOTE — PLAN OF CARE
Problem: Chronic Conditions and Co-morbidities  Goal: Patient's chronic conditions and co-morbidity symptoms are monitored and maintained or improved  Outcome: Not Progressing   Provider aware.

## 2024-01-22 NOTE — PATIENT INSTRUCTIONS
PHYSICIAN ORDERS AND DISCHARGE INSTRUCTIONS    Wound cleansing:               Do not scrub or use excessive force.              Wash hands with soap and water before and after dressing changes.  Prior to applying a clean dressing, cleanse wound with normal saline, wound cleanser, or mild soap and water.              Ask the physician or nurse before getting the wound(s) wet in a shower.     Wound Care Notes:   Applied for Kerecis grafts   Donated Kerecis Mari[employer] only, applied to Right Plantar Foot 12/28/23  Kerecis [employer] #1 applied to right plantar foot 10/30/23  Kerecis [employer] #2 applied to right plantar foot 11/6/23  Kerecis [employer] #3 applied to right plantar foot 11/13/23  Kerecis [employer] #4 applied to right plantar foot 11/20/23  Kerecis [employer] #5 applied to right plantar foot 11/27/23 Kerecis [employer] #6 applied to right plantar foot 12/5/23  Kerecis [employer] #7 applied to right plantar foot 12/21/23                             Orders for this week: 01/22/24      [facility]X COMPLETED [facility] TO [facility]: Fax # [phone]     Right Plantar Foot Wound - Wash with soap and water, pat dry.   Pack Sorbact to wound bed. Silver Ca alginate between toes.  Cover with two medium sorbex   Wrap with conform and ACE to ankle (Enclose toes).  Leave in place for 1 week.      Follow up with Dr Li Wednesday.  Call  for any questions or concerns.

## 2024-01-22 NOTE — PROGRESS NOTES
Wound Care Center Progress Visit      Devon Morgan  AGE: 63 y.o.   GENDER: male  : 1960  EPISODE DATE:  2024   Referred by: Storm Perrin     Subjective:     CHIEF COMPLAINT  WOUND   Problem List Items Addressed This Visit          Circulatory    WD-PAD (peripheral artery disease) (HCC)    Relevant Orders    Initiate Outpatient Wound Care Protocol       Endocrine    HBO-Diabetic foot ulcer with osteomyelitis (HCC) - Primary    Relevant Orders    Initiate Outpatient Wound Care Protocol    WD-Diabetic ulcer of right midfoot associated with type 2 diabetes mellitus, with necrosis of muscle (HCC)    Relevant Orders    Initiate Outpatient Wound Care Protocol    WD-Diabetes mellitus treated with insulin (HCC)    Relevant Orders    Initiate Outpatient Wound Care Protocol    WD-Diabetic ulcer of toe (fifth toe) of right foot associated with type 2 diabetes mellitus, with fat layer exposed (HCC)     Chief Complaint   Patient presents with    Wound Check        HISTORY of PRESENT ILLNESS      Devon Morgan is a 63 y.o. male who presents to the Wound Clinic for an initial visit for evaluation and treatment of Chronic diabetic ulcer(s) of  right foot.  The condition is of marked severity. The ulcer has been present for several months at initial visit to the wound clinic. The underlying cause is thought to be diabetes complicated PAD and infection with osteomyelitis.  The [facility] to [facility]. The patient has significant underlying medical conditions as below. Storm Perrin is PCP. The patient is a left BKA.    Wound Pain Timing/Severity: intermittent, mild  Quality of pain: dull, aching, tender  Severity of pain:  2 / 10   Modifying Factors: diabetes, poor glucose control, chronic pressure, shear force, arterial insufficiency, and anticoagulation therapy  Associated Signs/Symptoms: edema, erythema, drainage, and pain    LACY: as indicated

## 2024-01-23 NOTE — PROGRESS NOTES
[facility]  Hyperbaric Oxygen Therapy   Progress Note      NAME: Devon Morgan   MEDICAL RECORD NUMBER:  [id number]  AGE: 63 y.o.   GENDER: male  : 1960  EPISODE DATE:  2024     Subjective     HBO Treatment Number: 65 out of Total Treatments: 80    HBO Diagnosis:     Indications: Lower Extremity Diabetic Wound ___(site) (right foot)  Morejon: Morejon 3     Safety checks performed prior to treatment.  See doc flowsheets for documentation.    Objective           Recent Labs     24  1306   POCGLU 223*       Pre treatment Vital Signs       Temp: 97.3 °F (36.3 °C)     Pulse: 91     Respirations: 12     BP: (!) 149/76 (Noted to provider)     Blood Sugar 223    Post treatment Vital Signs  Temp: 97.6 °F (36.4 °C)  Pulse: 78  Respirations: 16  BP: (!) 174/82 (Noted to provider)  Blood Sugar Refused    Assessment        Physical Exam:  General Appearance:  alert and oriented to person, place and time, well-developed and well-nourished, in no acute distress    Pre Tympanic Membrane Assessment:  tympanic membranes intact bilaterally, normal color    Post Tympanic Membrane Assessment:  Right: Normal (No complaints per Pt.)  Left: Normal (No complaints per Pt.)    Pulmonary/Chest:  clear to auscultation bilaterally- no wheezes, rales or rhonchi, normal air movement, no respiratory distress    Cardiovascular:  normal, regular rate and rhythm    Chamber #: [id number]       Treatment Start Time: 1330     Pressure Reached Time: 1339  EDOUARD : 2  Number of Air Breaks:  Treatment Status: No Air break      Decompression Time: 1509   Treatment End Time: 1518    Symptoms Noted During Treatment: None    Adverse Event: no      Total time in chamber: 108  Minutes at depth: 90    I was present on these premises and immediately available to furnish assistance & direction throughout the procedure.       Plan          Devon Morgan is a 63 y.o. male  did successfully complete today's hyperbaric oxygen

## 2024-01-24 ENCOUNTER — HOSPITAL ENCOUNTER (OUTPATIENT)
Dept: WOUND CARE | Age: 64
Discharge: HOME | End: 2024-01-24
Attending: NURSE PRACTITIONER
Payer: OTHER GOVERNMENT

## 2024-01-24 VITALS
TEMPERATURE: 97.6 F | HEART RATE: 95 BPM | SYSTOLIC BLOOD PRESSURE: 124 MMHG | RESPIRATION RATE: 18 BRPM | DIASTOLIC BLOOD PRESSURE: 59 MMHG

## 2024-01-24 DIAGNOSIS — Z79.4 DIABETES MELLITUS TREATED WITH INSULIN (HCC): ICD-10-CM

## 2024-01-24 DIAGNOSIS — E11.69 DIABETIC FOOT ULCER WITH OSTEOMYELITIS (HCC): Primary | ICD-10-CM

## 2024-01-24 DIAGNOSIS — E11.9 DIABETES MELLITUS TREATED WITH INSULIN (HCC): ICD-10-CM

## 2024-01-24 DIAGNOSIS — L97.413 DIABETIC ULCER OF RIGHT MIDFOOT ASSOCIATED WITH TYPE 2 DIABETES MELLITUS, WITH NECROSIS OF MUSCLE (HCC): ICD-10-CM

## 2024-01-24 DIAGNOSIS — E11.621 DIABETIC FOOT ULCER WITH OSTEOMYELITIS (HCC): Primary | ICD-10-CM

## 2024-01-24 DIAGNOSIS — E11.621 DIABETIC ULCER OF RIGHT MIDFOOT ASSOCIATED WITH TYPE 2 DIABETES MELLITUS, WITH NECROSIS OF MUSCLE (HCC): ICD-10-CM

## 2024-01-24 DIAGNOSIS — L97.509 DIABETIC FOOT ULCER WITH OSTEOMYELITIS (HCC): Primary | ICD-10-CM

## 2024-01-24 DIAGNOSIS — M86.9 DIABETIC FOOT ULCER WITH OSTEOMYELITIS (HCC): Primary | ICD-10-CM

## 2024-01-24 DIAGNOSIS — I73.9 PAD (PERIPHERAL ARTERY DISEASE) (HCC): ICD-10-CM

## 2024-01-24 PROCEDURE — 11044 DBRDMT BONE 1ST 20 SQ CM/<: CPT

## 2024-01-24 PROCEDURE — 11047 DBRDMT BONE EACH ADDL: CPT

## 2024-01-24 RX ORDER — BACITRACIN ZINC AND POLYMYXIN B SULFATE 500; 1000 [USP'U]/G; [USP'U]/G
OINTMENT TOPICAL ONCE
OUTPATIENT
Start: 2024-01-24 | End: 2024-01-24

## 2024-01-24 RX ORDER — LIDOCAINE HYDROCHLORIDE 40 MG/ML
SOLUTION TOPICAL ONCE
OUTPATIENT
Start: 2024-01-24 | End: 2024-01-24

## 2024-01-24 RX ORDER — CLOBETASOL PROPIONATE 0.5 MG/G
OINTMENT TOPICAL ONCE
OUTPATIENT
Start: 2024-01-24 | End: 2024-01-24

## 2024-01-24 RX ORDER — GENTAMICIN SULFATE 1 MG/G
OINTMENT TOPICAL ONCE
OUTPATIENT
Start: 2024-01-24 | End: 2024-01-24

## 2024-01-24 RX ORDER — BACITRACIN ZINC 500 [USP'U]/G
OINTMENT TOPICAL ONCE
OUTPATIENT
Start: 2024-01-24 | End: 2024-01-24

## 2024-01-24 RX ORDER — LIDOCAINE 50 MG/G
OINTMENT TOPICAL ONCE
OUTPATIENT
Start: 2024-01-24 | End: 2024-01-24

## 2024-01-24 RX ORDER — LIDOCAINE 40 MG/G
CREAM TOPICAL ONCE
OUTPATIENT
Start: 2024-01-24 | End: 2024-01-24

## 2024-01-24 RX ORDER — TRIAMCINOLONE ACETONIDE 1 MG/G
OINTMENT TOPICAL ONCE
OUTPATIENT
Start: 2024-01-24 | End: 2024-01-24

## 2024-01-24 RX ORDER — IBUPROFEN 200 MG
TABLET ORAL ONCE
OUTPATIENT
Start: 2024-01-24 | End: 2024-01-24

## 2024-01-24 RX ORDER — SODIUM CHLOR/HYPOCHLOROUS ACID 0.033 %
SOLUTION, IRRIGATION IRRIGATION ONCE
OUTPATIENT
Start: 2024-01-24 | End: 2024-01-24

## 2024-01-24 RX ORDER — BETAMETHASONE DIPROPIONATE 0.05 %
OINTMENT (GRAM) TOPICAL ONCE
OUTPATIENT
Start: 2024-01-24 | End: 2024-01-24

## 2024-01-24 ASSESSMENT — PAIN SCALES - GENERAL: PAINLEVEL_OUTOF10: 0

## 2024-01-24 NOTE — PROGRESS NOTES
Wound Care Center Progress Note With Procedure    Devon Morgan  AGE: 63 y.o.   GENDER: male  : 1960  EPISODE DATE:  2024     Subjective:     Chief Complaint   Patient presents with    Wound Check     Right foot          HISTORY of PRESENT ILLNESS      Devon Morgan is a 63 y.o. male who presents today for wound evaluation of Chronic arterial, diabetic, and pressure ulcer(s) of the right foot.  The ulcer is of marked severity.  The underlying cause of the wound is diabetes, PAD and pressure.  The foot is probably not salvagable. [facility] Discussed my assessment with him.   Wound Pain Timing/Severity: none  Quality of pain: N/A  Severity of pain:  0 / 10   Modifying Factors: diabetes, chronic pressure, and arterial insufficiency  Associated Signs/Symptoms: edema, drainage, and odor        PAST MEDICAL HISTORY        Diagnosis Date    Cerebral artery occlusion with cerebral infarction (HCC)     Diabetes mellitus (HCC)     Hypertension        PAST SURGICAL HISTORY    Past Surgical History:   Procedure Laterality Date    ABDOMEN SURGERY      BACK SURGERY      CARDIAC SURGERY      COLONOSCOPY      EYE SURGERY      VASCULAR SURGERY         FAMILY HISTORY    Family History   Problem Relation Age of Onset    Clotting Disorder Mother     Early Death Father     Diabetes Father     Coronary Art Dis Father     Arrhythmia Father     Alcohol Abuse Maternal Uncle     Diabetes Paternal Aunt        SOCIAL HISTORY    Social History     Tobacco Use    Smoking status: Never    Smokeless tobacco: Never    Tobacco comments:     Formally tried TCH Caramel once years ago   Vaping Use    Vaping Use: Never used   Substance Use Topics    Alcohol use: Yes     Comment: Occassional    Drug use: Never       ALLERGIES    Allergies   Allergen Reactions    Bee Venom Swelling    Tylenol With Codeine #3 [Acetaminophen-Codeine] Hives and Swelling       MEDICATIONS    Current Outpatient Medications on

## 2024-01-24 NOTE — PATIENT INSTRUCTIONS
PHYSICIAN ORDERS AND DISCHARGE INSTRUCTIONS    Wound cleansing:               Do not scrub or use excessive force.              Wash hands with soap and water before and after dressing changes.  Prior to applying a clean dressing, cleanse wound with normal saline, wound cleanser, or mild soap and water.              Ask the physician or nurse before getting the wound(s) wet in a shower.     Wound Care Notes:   Applied for Kerecis grafts   Donated Kerecis MariGen only, applied to Right Plantar Foot 12/28/23  Kerecis [employer] #1 applied to right plantar foot 10/30/23  Kerecis [employer] #2 applied to right plantar foot 11/6/23  Kerecis [employer] #3 applied to right plantar foot 11/13/23  Kerecis [employer] #4 applied to right plantar foot 11/20/23  Kerecis [employer] #5 applied to right plantar foot 11/27/23 Kerecis [employer] #6 applied to right plantar foot 12/5/23  Kerecis Mari[employer] #7 applied to right plantar foot 12/21/23                             Orders for this week: 01/24/24      [facility]X COMPLETED [facility]: Fax # [phone]     Right Plantar Foot Wound - Wash with soap and water, pat dry.   Paint with betadine  Apply Sorbact to wound bed.  Cover with two medium sorbex   Wrap with conform and ACE to ankle (Enclose toes).  Leave in place for 1 week.    Right 2nd toe -   Paint with betadine daily and PRN  Keep clean and dry!       Set up apt with Dr brewster for arterial intervention   Follow up with Dr Li Wednesday.  Call  for any questions or concerns.

## 2024-01-24 NOTE — PLAN OF CARE
Problem: Chronic Conditions and Co-morbidities  Goal: Patient's chronic conditions and co-morbidity symptoms are monitored and maintained or improved  Outcome: Not Progressing     Problem: Pain  Goal: Verbalizes/displays adequate comfort level or baseline comfort level  Outcome: Not Progressing

## 2024-01-29 ENCOUNTER — HOSPITAL ENCOUNTER (OUTPATIENT)
Dept: WOUND CARE | Age: 64
Discharge: HOME | End: 2024-01-29
Attending: NURSE PRACTITIONER
Payer: OTHER GOVERNMENT

## 2024-01-29 ENCOUNTER — HOSPITAL ENCOUNTER (OUTPATIENT)
Dept: HYPERBARIC MEDICINE | Age: 64
Discharge: HOME | End: 2024-01-29
Payer: OTHER GOVERNMENT

## 2024-01-29 VITALS
TEMPERATURE: 96.6 F | RESPIRATION RATE: 16 BRPM | DIASTOLIC BLOOD PRESSURE: 80 MMHG | SYSTOLIC BLOOD PRESSURE: 149 MMHG | HEART RATE: 96 BPM

## 2024-01-29 DIAGNOSIS — M86.9 DIABETIC FOOT ULCER WITH OSTEOMYELITIS (HCC): Primary | ICD-10-CM

## 2024-01-29 DIAGNOSIS — L97.509 DIABETIC FOOT ULCER WITH OSTEOMYELITIS (HCC): Primary | ICD-10-CM

## 2024-01-29 DIAGNOSIS — E11.69 DIABETIC FOOT ULCER WITH OSTEOMYELITIS (HCC): Primary | ICD-10-CM

## 2024-01-29 DIAGNOSIS — E11.621 DIABETIC FOOT ULCER WITH OSTEOMYELITIS (HCC): Primary | ICD-10-CM

## 2024-01-29 PROCEDURE — 99213 OFFICE O/P EST LOW 20 MIN: CPT

## 2024-01-29 PROCEDURE — G0277 HBOT, FULL BODY CHAMBER, 30M: HCPCS

## 2024-01-29 ASSESSMENT — PAIN SCALES - GENERAL
PAINLEVEL_OUTOF10: 8
PAINLEVEL_OUTOF10: 8

## 2024-01-29 ASSESSMENT — PAIN DESCRIPTION - ORIENTATION
ORIENTATION: RIGHT
ORIENTATION: RIGHT

## 2024-01-29 ASSESSMENT — PAIN DESCRIPTION - DESCRIPTORS
DESCRIPTORS: THROBBING
DESCRIPTORS: THROBBING

## 2024-01-29 ASSESSMENT — PAIN DESCRIPTION - FREQUENCY
FREQUENCY: CONTINUOUS
FREQUENCY: CONTINUOUS

## 2024-01-29 ASSESSMENT — PAIN DESCRIPTION - LOCATION
LOCATION: ANKLE;FOOT
LOCATION: ANKLE;FOOT

## 2024-01-29 ASSESSMENT — PAIN DESCRIPTION - PAIN TYPE
TYPE: ACUTE PAIN
TYPE: ACUTE PAIN

## 2024-01-29 ASSESSMENT — PAIN DESCRIPTION - ONSET
ONSET: ON-GOING
ONSET: ON-GOING

## 2024-01-29 NOTE — PROGRESS NOTES
[facility]  Hyperbaric Oxygen Therapy   Progress Note      NAME: Devon Morgan   MEDICAL RECORD NUMBER:  [id number]  AGE: 63 y.o.   GENDER: male  : 1960  EPISODE DATE:  2024     Subjective     HBO Treatment Number: 66 out of Total Treatments: 80    HBO Diagnosis:     Indications: Lower Extremity Diabetic Wound ___(site) (Right foot)  Morejon: Morejon 3     Safety checks performed prior to treatment.  See doc flowsheets for documentation.    Objective           No results for input(s): \"POCGLU\" in the last 72 hours.    Pre treatment Vital Signs       Temp: 97.5 °F (36.4 °C)     Pulse: 85     Respirations: 16     BP: (!) 165/83 (Noted to Provider)     Blood Sugar N/A    Post treatment Vital Signs  Temp: (!) 96.6 °F (35.9 °C)  Pulse: 96  Respirations: 16  BP: (!) 149/80 (Noted to Provider)  Blood Sugar N/A    Assessment        Physical Exam:  General Appearance:  alert and oriented to person, place and time, well-developed and well-nourished, in no acute distress    Pre Tympanic Membrane Assessment:  tympanic membranes intact bilaterally, normal color    Post Tympanic Membrane Assessment:  Right: Normal (No complaints per Pt.)  Left: Normal (No complaints per Pt.)    Pulmonary/Chest:  clear to auscultation bilaterally- no wheezes, rales or rhonchi, normal air movement, no respiratory distress and no chest wall tenderness    Cardiovascular:  normal, regular rate and rhythm    Chamber #: [id number]       Treatment Start Time: 1320     Pressure Reached Time: 1328  EDOUARD : 2  Number of Air Breaks:  Treatment Status: No Air break      Decompression Time: 1430   Treatment End Time: 1438    Symptoms Noted During Treatment: None    Adverse Event: no      Total time in chamber:  Minutes at depth:     I was present on these premises and immediately available to furnish assistance & direction throughout the procedure.       Plan          Devon Morgan is a 63 y.o. male  did successfully

## 2024-01-29 NOTE — DISCHARGE INSTR - COC
Cluster (Active)   Wound Image   01/16/24 1553   Wound Etiology Diabetic 01/24/24 0911   Dressing Status New dressing applied 01/29/24 1502   Wound Cleansed Soap and water;Wound cleanser 01/24/24 0911   Offloading for Diabetic Foot Ulcers Post op shoe 01/29/24 1502   Wound Length (cm) 6.2 cm 01/24/24 0911   Wound Width (cm) 2 cm 01/24/24 0911   Wound Depth (cm) 0.1 cm 01/24/24 0911   Wound Surface Area (cm^2) 12.4 cm^2 01/24/24 0911   Change in Wound Size % (l*w) -37.78 01/24/24 0911   Wound Volume (cm^3) 1.24 cm^3 01/24/24 0911   Wound Healing % -38 01/24/24 0911   Post-Procedure Length (cm) 6.2 cm 01/22/24 1558   Post-Procedure Width (cm) 2 cm 01/22/24 1558   Post-Procedure Depth (cm) 0.1 cm 01/22/24 1558   Post-Procedure Surface Area (cm^2) 12.4 cm^2 01/22/24 1558   Post-Procedure Volume (cm^3) 1.24 cm^3 01/22/24 1558   Distance Tunneling (cm) 0 cm 01/24/24 0911   Tunneling Position ___ O'Clock 0 01/24/24 0911   Undermining Starts ___ O'Clock 0 01/24/24 0911   Undermining Ends___ O'Clock 0 01/24/24 0911   Undermining Maxium Distance (cm) 0 01/24/24 0911   Wound Assessment Eschar dry;Eschar moist;Slough;Other (Comment) 01/24/24 0911   Drainage Amount None (dry) 01/24/24 0911   Drainage Description Brown 01/22/24 1549   Odor Moderate 01/22/24 1549   Brittni-wound Assessment Dry/flaky 01/24/24 0911   Margins Attached edges 01/24/24 0911   Wound Thickness Description not for Pressure Injury Full thickness 01/22/24 1549   Number of days: 27        Elimination:  Continence:   Bowel: {YES / NO:19727}  Bladder: {YES / NO:19727}  Urinary Catheter: {Urinary Catheter:970777641}   Colostomy/Ileostomy/Ileal Conduit: {YES / NO:19727}       Date of Last BM: ***  No intake or output data in the 24 hours ending 01/29/24 1505  No intake/output data recorded.    Safety Concerns:     { [facility] Safety Concerns:846888345}    Impairments/Disabilities:      {[facility] Impairments/Disabilities:369341820}    Nutrition Therapy:  Current Nutrition

## 2024-01-29 NOTE — PLAN OF CARE
Pt. Tolerated treatment well, positioned onto right side with blankets folded between . Treatment ended early because patient needed to come out to use the bathroom.

## 2024-01-31 ENCOUNTER — HOSPITAL ENCOUNTER (OUTPATIENT)
Dept: WOUND CARE | Age: 64
Discharge: HOME | End: 2024-01-31
Attending: NURSE PRACTITIONER
Payer: OTHER GOVERNMENT

## 2024-01-31 VITALS
SYSTOLIC BLOOD PRESSURE: 148 MMHG | TEMPERATURE: 97 F | DIASTOLIC BLOOD PRESSURE: 64 MMHG | HEART RATE: 85 BPM | RESPIRATION RATE: 18 BRPM

## 2024-01-31 DIAGNOSIS — E11.9 DIABETES MELLITUS TREATED WITH INSULIN (HCC): ICD-10-CM

## 2024-01-31 DIAGNOSIS — L97.509 DIABETIC FOOT ULCER WITH OSTEOMYELITIS (HCC): ICD-10-CM

## 2024-01-31 DIAGNOSIS — L97.413 DIABETIC ULCER OF RIGHT MIDFOOT ASSOCIATED WITH TYPE 2 DIABETES MELLITUS, WITH NECROSIS OF MUSCLE (HCC): Primary | ICD-10-CM

## 2024-01-31 DIAGNOSIS — M86.9 DIABETIC FOOT ULCER WITH OSTEOMYELITIS (HCC): ICD-10-CM

## 2024-01-31 DIAGNOSIS — Z79.4 DIABETES MELLITUS TREATED WITH INSULIN (HCC): ICD-10-CM

## 2024-01-31 DIAGNOSIS — E11.621 DIABETIC ULCER OF RIGHT MIDFOOT ASSOCIATED WITH TYPE 2 DIABETES MELLITUS, WITH NECROSIS OF MUSCLE (HCC): Primary | ICD-10-CM

## 2024-01-31 DIAGNOSIS — E11.69 DIABETIC FOOT ULCER WITH OSTEOMYELITIS (HCC): ICD-10-CM

## 2024-01-31 DIAGNOSIS — E11.621 DIABETIC FOOT ULCER WITH OSTEOMYELITIS (HCC): ICD-10-CM

## 2024-01-31 DIAGNOSIS — I73.9 PAD (PERIPHERAL ARTERY DISEASE) (HCC): ICD-10-CM

## 2024-01-31 PROCEDURE — 11043 DBRDMT MUSC&/FSCA 1ST 20/<: CPT

## 2024-01-31 PROCEDURE — 11046 DBRDMT MUSC&/FSCA EA ADDL: CPT

## 2024-01-31 RX ORDER — LIDOCAINE 50 MG/G
OINTMENT TOPICAL ONCE
OUTPATIENT
Start: 2024-01-31 | End: 2024-01-31

## 2024-01-31 RX ORDER — BACITRACIN ZINC AND POLYMYXIN B SULFATE 500; 1000 [USP'U]/G; [USP'U]/G
OINTMENT TOPICAL ONCE
OUTPATIENT
Start: 2024-01-31 | End: 2024-01-31

## 2024-01-31 RX ORDER — GENTAMICIN SULFATE 1 MG/G
OINTMENT TOPICAL ONCE
OUTPATIENT
Start: 2024-01-31 | End: 2024-01-31

## 2024-01-31 RX ORDER — LIDOCAINE HYDROCHLORIDE 40 MG/ML
SOLUTION TOPICAL ONCE
OUTPATIENT
Start: 2024-01-31 | End: 2024-01-31

## 2024-01-31 RX ORDER — IBUPROFEN 200 MG
TABLET ORAL ONCE
OUTPATIENT
Start: 2024-01-31 | End: 2024-01-31

## 2024-01-31 RX ORDER — LIDOCAINE 40 MG/G
CREAM TOPICAL ONCE
OUTPATIENT
Start: 2024-01-31 | End: 2024-01-31

## 2024-01-31 RX ORDER — BETAMETHASONE DIPROPIONATE 0.05 %
OINTMENT (GRAM) TOPICAL ONCE
OUTPATIENT
Start: 2024-01-31 | End: 2024-01-31

## 2024-01-31 RX ORDER — TRIAMCINOLONE ACETONIDE 1 MG/G
OINTMENT TOPICAL ONCE
OUTPATIENT
Start: 2024-01-31 | End: 2024-01-31

## 2024-01-31 RX ORDER — BACITRACIN ZINC 500 [USP'U]/G
OINTMENT TOPICAL ONCE
OUTPATIENT
Start: 2024-01-31 | End: 2024-01-31

## 2024-01-31 RX ORDER — SODIUM CHLOR/HYPOCHLOROUS ACID 0.033 %
SOLUTION, IRRIGATION IRRIGATION ONCE
OUTPATIENT
Start: 2024-01-31 | End: 2024-01-31

## 2024-01-31 RX ORDER — CLOBETASOL PROPIONATE 0.5 MG/G
OINTMENT TOPICAL ONCE
OUTPATIENT
Start: 2024-01-31 | End: 2024-01-31

## 2024-01-31 ASSESSMENT — PAIN DESCRIPTION - FREQUENCY: FREQUENCY: CONTINUOUS

## 2024-01-31 ASSESSMENT — PAIN DESCRIPTION - LOCATION: LOCATION: ANKLE;FOOT

## 2024-01-31 ASSESSMENT — PAIN DESCRIPTION - PAIN TYPE: TYPE: CHRONIC PAIN

## 2024-01-31 ASSESSMENT — PAIN SCALES - GENERAL: PAINLEVEL_OUTOF10: 8

## 2024-01-31 ASSESSMENT — PAIN DESCRIPTION - ONSET: ONSET: ON-GOING

## 2024-01-31 ASSESSMENT — PAIN - FUNCTIONAL ASSESSMENT: PAIN_FUNCTIONAL_ASSESSMENT: PREVENTS OR INTERFERES WITH ALL ACTIVE AND SOME PASSIVE ACTIVITIES

## 2024-01-31 ASSESSMENT — PAIN DESCRIPTION - DESCRIPTORS: DESCRIPTORS: THROBBING

## 2024-01-31 ASSESSMENT — PAIN DESCRIPTION - ORIENTATION: ORIENTATION: RIGHT

## 2024-01-31 NOTE — PROGRESS NOTES
Wound Care Center Progress Note With Procedure    Devon Morgan  AGE: 63 y.o.   GENDER: male  : 1960  EPISODE DATE:  2024     Subjective:     Chief Complaint   Patient presents with    Wound Check     Right Foot          HISTORY of PRESENT ILLNESS      Devon Morgan is a 63 y.o. male who presents today for wound evaluation of Chronic arterial, diabetic, and pressure ulcer(s) of the left foot.  The ulcer is of marked severity.  The underlying cause of the wound is diabetes, pressure and PAD.  I feel that his foot is longer salvagable. He wants a referral to orthopedic surgeon to discuss possible TMA. I will refer him to Dr. Leong.  Wound Pain Timing/Severity: none  Quality of pain: N/A  Severity of pain:  0 / 10   Modifying Factors: diabetes and arterial insufficiency  Associated Signs/Symptoms: edema, drainage, and odor        PAST MEDICAL HISTORY        Diagnosis Date    Cerebral artery occlusion with cerebral infarction (HCC)     Diabetes mellitus (HCC)     Hypertension        PAST SURGICAL HISTORY    Past Surgical History:   Procedure Laterality Date    ABDOMEN SURGERY      BACK SURGERY      CARDIAC SURGERY      COLONOSCOPY      EYE SURGERY      VASCULAR SURGERY         FAMILY HISTORY    Family History   Problem Relation Age of Onset    Clotting Disorder Mother     Early Death Father     Diabetes Father     Coronary Art Dis Father     Arrhythmia Father     Alcohol Abuse Maternal Uncle     Diabetes Paternal Aunt        SOCIAL HISTORY    Social History     Tobacco Use    Smoking status: Never    Smokeless tobacco: Never    Tobacco comments:     Formally tried TCH Caramel once years ago   Vaping Use    Vaping Use: Never used   Substance Use Topics    Alcohol use: Yes     Comment: Occassional    Drug use: Never       ALLERGIES    Allergies   Allergen Reactions    Bee Venom Swelling    Tylenol With Codeine #3 [Acetaminophen-Codeine] Hives and Swelling       MEDICATIONS    Current Outpatient

## 2024-01-31 NOTE — PATIENT INSTRUCTIONS
PHYSICIAN ORDERS AND DISCHARGE INSTRUCTIONS    Wound cleansing:               Do not scrub or use excessive force.              Wash hands with soap and water before and after dressing changes.  Prior to applying a clean dressing, cleanse wound with normal saline, wound cleanser, or mild soap and water.              Ask the physician or nurse before getting the wound(s) wet in a shower.     Wound Care Notes:   Applied for Kerecis grafts   Donated Kerecis MariGen only, applied to Right Plantar Foot 12/28/23  Kerecis [employer] #1 applied to right plantar foot 10/30/23  Kerecis [employer] #2 applied to right plantar foot 11/6/23  Kerecis [employer] #3 applied to right plantar foot 11/13/23  Kerecis [employer] #4 applied to right plantar foot 11/20/23  Kerecis [employer] #5 applied to right plantar foot 11/27/23 Kerecis MariGen #6 applied to right plantar foot 12/5/23  Kerecis MariGen #7 applied to right plantar foot 12/21/23                             Orders for this week: 01/31/24      [facility]ax # [phone]     Right Plantar Foot Wound - Wash with soap and water, pat dry.   Paint with betadine  Apply Sorbact to wound bed.  Cover with two medium sorbex   Wrap with conform and ACE to ankle (Enclose toes).  Leave in place for 1 week.    Right 2nd toe -   Paint with betadine daily and PRN  Keep clean and dry!       Set up apt with Dr brewster for arterial intervention   Follow up with Dr Li Wednesday.  Call  for any questions or concerns.

## 2024-01-31 NOTE — PLAN OF CARE
Problem: Chronic Conditions and Co-morbidities  Goal: Patient's chronic conditions and co-morbidity symptoms are monitored and maintained or improved  1/31/2024 0933 by Yudelka Verma LPN  Outcome: Progressing  1/31/2024 0933 by Yudelka Verma LPN  Outcome: Progressing     Problem: Pain  Goal: Verbalizes/displays adequate comfort level or baseline comfort level  Outcome: Progressing

## 2024-02-01 NOTE — PROGRESS NOTES
SN made follow up call to patient because he left message cancelling HBO appointment for 1/30/2024. Pt. Answered & stated\" I don't have the energy to make it today , I can't get my butt out of bed. My wife offered to bring me but I can't get dressed to get there and I can't get undressed when I get there for the chamber. I don't have any energy\". Asked patient if he was keeping his appointment with Dr. Li on Wednesday 1/31/24@ 0900, then HBO @ 1000, Pt. Stated \"I'll be there tomorrow\".

## 2024-02-02 ENCOUNTER — HOSPITAL ENCOUNTER (OUTPATIENT)
Dept: WOUND CARE | Age: 64
Discharge: HOME | End: 2024-02-02
Attending: NURSE PRACTITIONER
Payer: OTHER GOVERNMENT

## 2024-02-02 VITALS
DIASTOLIC BLOOD PRESSURE: 61 MMHG | HEART RATE: 84 BPM | RESPIRATION RATE: 18 BRPM | SYSTOLIC BLOOD PRESSURE: 131 MMHG | TEMPERATURE: 96.5 F

## 2024-02-02 PROCEDURE — 99213 OFFICE O/P EST LOW 20 MIN: CPT

## 2024-02-02 ASSESSMENT — PAIN DESCRIPTION - LOCATION: LOCATION: FOOT

## 2024-02-02 ASSESSMENT — PAIN - FUNCTIONAL ASSESSMENT: PAIN_FUNCTIONAL_ASSESSMENT: PREVENTS OR INTERFERES SOME ACTIVE ACTIVITIES AND ADLS

## 2024-02-02 ASSESSMENT — PAIN DESCRIPTION - DESCRIPTORS: DESCRIPTORS: THROBBING

## 2024-02-02 ASSESSMENT — PAIN DESCRIPTION - FREQUENCY: FREQUENCY: CONTINUOUS

## 2024-02-02 ASSESSMENT — PAIN DESCRIPTION - ONSET: ONSET: ON-GOING

## 2024-02-02 ASSESSMENT — PAIN SCALES - GENERAL: PAINLEVEL_OUTOF10: 6

## 2024-02-02 ASSESSMENT — PAIN DESCRIPTION - ORIENTATION: ORIENTATION: RIGHT

## 2024-02-02 ASSESSMENT — PAIN DESCRIPTION - PAIN TYPE: TYPE: CHRONIC PAIN

## 2024-02-02 NOTE — PATIENT INSTRUCTIONS
PHYSICIAN ORDERS AND DISCHARGE INSTRUCTIONS    Wound cleansing:               Do not scrub or use excessive force.              Wash hands with soap and water before and after dressing changes.  Prior to applying a clean dressing, cleanse wound with normal saline, wound cleanser, or mild soap and water.              Ask the physician or nurse before getting the wound(s) wet in a shower.     Wound Care Notes:   Applied for Kerecis grafts   Donated Kerecis MariGen only, applied to Right Plantar Foot 12/28/23  Kerecis MariGen #1 applied to right plantar foot 10/30/23  Kerecis MariGen #2 applied to right plantar foot 11/6/23  Kerecis [employer] #3 applied to right plantar foot 11/13/23  Kerecis [employer] #4 applied to right plantar foot 11/20/23  Kerecis [employer] #5 applied to right plantar foot 11/27/23 Kerecis MariGen #6 applied to right plantar foot 12/5/23  Kerecis MariGen #7 applied to right plantar foot 12/21/23                             Orders for this week: 02/02/24      [facility] COMPLET[facility]: Fax # [phone]     Right Plantar Foot Wound - Wash with soap and water, pat dry.   Paint with betadine  Apply Sorbact to wound bed.  Cover with two medium sorbex   Wrap with conform and ACE to ankle (Enclose toes).  Leave in place for 1 week.    Right 2nd toe -   Paint with betadine daily and PRN  Keep clean and dry!       Set up apt with Dr brewster for arterial intervention   Follow up with Dr Li Wednesday.  Call  for any questions or concerns.

## 2024-02-05 ENCOUNTER — HOSPITAL ENCOUNTER (OUTPATIENT)
Dept: WOUND CARE | Age: 64
Discharge: HOME | End: 2024-02-05
Attending: NURSE PRACTITIONER
Payer: OTHER GOVERNMENT

## 2024-02-05 VITALS
SYSTOLIC BLOOD PRESSURE: 150 MMHG | RESPIRATION RATE: 20 BRPM | TEMPERATURE: 96.9 F | HEART RATE: 95 BPM | DIASTOLIC BLOOD PRESSURE: 66 MMHG

## 2024-02-05 PROCEDURE — 99213 OFFICE O/P EST LOW 20 MIN: CPT

## 2024-02-05 ASSESSMENT — PAIN DESCRIPTION - PAIN TYPE: TYPE: CHRONIC PAIN

## 2024-02-05 ASSESSMENT — PAIN DESCRIPTION - FREQUENCY: FREQUENCY: CONTINUOUS

## 2024-02-05 ASSESSMENT — PAIN DESCRIPTION - DESCRIPTORS: DESCRIPTORS: THROBBING

## 2024-02-05 ASSESSMENT — PAIN DESCRIPTION - LOCATION: LOCATION: FOOT

## 2024-02-05 ASSESSMENT — PAIN SCALES - GENERAL: PAINLEVEL_OUTOF10: 9

## 2024-02-05 ASSESSMENT — PAIN - FUNCTIONAL ASSESSMENT: PAIN_FUNCTIONAL_ASSESSMENT: PREVENTS OR INTERFERES SOME ACTIVE ACTIVITIES AND ADLS

## 2024-02-05 ASSESSMENT — PAIN DESCRIPTION - ONSET: ONSET: ON-GOING

## 2024-02-05 ASSESSMENT — PAIN DESCRIPTION - ORIENTATION: ORIENTATION: RIGHT

## 2024-02-07 ENCOUNTER — HOSPITAL ENCOUNTER (OUTPATIENT)
Dept: WOUND CARE | Age: 64
Discharge: HOME | End: 2024-02-07
Attending: NURSE PRACTITIONER
Payer: OTHER GOVERNMENT

## 2024-02-07 VITALS
SYSTOLIC BLOOD PRESSURE: 135 MMHG | DIASTOLIC BLOOD PRESSURE: 61 MMHG | RESPIRATION RATE: 18 BRPM | HEART RATE: 92 BPM | TEMPERATURE: 97.3 F

## 2024-02-07 DIAGNOSIS — L97.509 DIABETIC FOOT ULCER WITH OSTEOMYELITIS (HCC): Primary | ICD-10-CM

## 2024-02-07 DIAGNOSIS — E11.621 DIABETIC FOOT ULCER WITH OSTEOMYELITIS (HCC): Primary | ICD-10-CM

## 2024-02-07 DIAGNOSIS — I73.9 PAD (PERIPHERAL ARTERY DISEASE) (HCC): ICD-10-CM

## 2024-02-07 DIAGNOSIS — E11.9 DIABETES MELLITUS TREATED WITH INSULIN (HCC): ICD-10-CM

## 2024-02-07 DIAGNOSIS — M86.9 DIABETIC FOOT ULCER WITH OSTEOMYELITIS (HCC): Primary | ICD-10-CM

## 2024-02-07 DIAGNOSIS — Z79.4 DIABETES MELLITUS TREATED WITH INSULIN (HCC): ICD-10-CM

## 2024-02-07 DIAGNOSIS — E11.621 DIABETIC ULCER OF RIGHT MIDFOOT ASSOCIATED WITH TYPE 2 DIABETES MELLITUS, WITH NECROSIS OF MUSCLE (HCC): ICD-10-CM

## 2024-02-07 DIAGNOSIS — L97.413 DIABETIC ULCER OF RIGHT MIDFOOT ASSOCIATED WITH TYPE 2 DIABETES MELLITUS, WITH NECROSIS OF MUSCLE (HCC): ICD-10-CM

## 2024-02-07 DIAGNOSIS — E11.69 DIABETIC FOOT ULCER WITH OSTEOMYELITIS (HCC): Primary | ICD-10-CM

## 2024-02-07 PROCEDURE — 11044 DBRDMT BONE 1ST 20 SQ CM/<: CPT

## 2024-02-07 PROCEDURE — 11047 DBRDMT BONE EACH ADDL: CPT

## 2024-02-07 RX ORDER — SODIUM CHLOR/HYPOCHLOROUS ACID 0.033 %
SOLUTION, IRRIGATION IRRIGATION ONCE
OUTPATIENT
Start: 2024-02-07 | End: 2024-02-07

## 2024-02-07 RX ORDER — IBUPROFEN 200 MG
TABLET ORAL ONCE
OUTPATIENT
Start: 2024-02-07 | End: 2024-02-07

## 2024-02-07 RX ORDER — BETAMETHASONE DIPROPIONATE 0.05 %
OINTMENT (GRAM) TOPICAL ONCE
OUTPATIENT
Start: 2024-02-07 | End: 2024-02-07

## 2024-02-07 RX ORDER — CLOBETASOL PROPIONATE 0.5 MG/G
OINTMENT TOPICAL ONCE
OUTPATIENT
Start: 2024-02-07 | End: 2024-02-07

## 2024-02-07 RX ORDER — BACITRACIN ZINC 500 [USP'U]/G
OINTMENT TOPICAL ONCE
OUTPATIENT
Start: 2024-02-07 | End: 2024-02-07

## 2024-02-07 RX ORDER — GENTAMICIN SULFATE 1 MG/G
OINTMENT TOPICAL ONCE
OUTPATIENT
Start: 2024-02-07 | End: 2024-02-07

## 2024-02-07 RX ORDER — LIDOCAINE HYDROCHLORIDE 40 MG/ML
SOLUTION TOPICAL ONCE
OUTPATIENT
Start: 2024-02-07 | End: 2024-02-07

## 2024-02-07 RX ORDER — BACITRACIN ZINC AND POLYMYXIN B SULFATE 500; 1000 [USP'U]/G; [USP'U]/G
OINTMENT TOPICAL ONCE
OUTPATIENT
Start: 2024-02-07 | End: 2024-02-07

## 2024-02-07 RX ORDER — LIDOCAINE 50 MG/G
OINTMENT TOPICAL ONCE
OUTPATIENT
Start: 2024-02-07 | End: 2024-02-07

## 2024-02-07 RX ORDER — LIDOCAINE 40 MG/G
CREAM TOPICAL ONCE
OUTPATIENT
Start: 2024-02-07 | End: 2024-02-07

## 2024-02-07 RX ORDER — TRIAMCINOLONE ACETONIDE 1 MG/G
OINTMENT TOPICAL ONCE
OUTPATIENT
Start: 2024-02-07 | End: 2024-02-07

## 2024-02-07 ASSESSMENT — PAIN DESCRIPTION - DESCRIPTORS: DESCRIPTORS: THROBBING;STABBING

## 2024-02-07 ASSESSMENT — PAIN DESCRIPTION - LOCATION: LOCATION: FOOT

## 2024-02-07 ASSESSMENT — PAIN DESCRIPTION - ORIENTATION: ORIENTATION: RIGHT

## 2024-02-07 ASSESSMENT — PAIN DESCRIPTION - FREQUENCY: FREQUENCY: CONTINUOUS

## 2024-02-07 ASSESSMENT — PAIN - FUNCTIONAL ASSESSMENT: PAIN_FUNCTIONAL_ASSESSMENT: PREVENTS OR INTERFERES SOME ACTIVE ACTIVITIES AND ADLS

## 2024-02-07 ASSESSMENT — PAIN SCALES - GENERAL: PAINLEVEL_OUTOF10: 8

## 2024-02-07 ASSESSMENT — PAIN DESCRIPTION - ONSET: ONSET: ON-GOING

## 2024-02-07 ASSESSMENT — PAIN DESCRIPTION - PAIN TYPE: TYPE: CHRONIC PAIN

## 2024-02-07 NOTE — PROGRESS NOTES
Wound Care Center Progress Note With Procedure    Devon Morgan  AGE: 63 y.o.   GENDER: male  : 1960  EPISODE DATE:  2024     Subjective:     Chief Complaint   Patient presents with    Wound Check     Right foot         HISTORY of PRESENT ILLNESS      Devon Morgan is a 63 y.o. male who presents today for wound evaluation of Chronic arterial, diabetic, and pressure ulcer(s) of the right foot.  The ulcer is of marked severity.  The underlying cause of the wound is diabetes, PAD and pressure.  Wound Pain Timing/Severity: none  Quality of pain: N/A  Severity of pain:  0 / 10   Modifying Factors: diabetes, chronic pressure, and arterial insufficiency  Associated Signs/Symptoms: edema, drainage, and odor        PAST MEDICAL HISTORY        Diagnosis Date    Cerebral artery occlusion with cerebral infarction (HCC)     Diabetes mellitus (HCC)     Hypertension        PAST SURGICAL HISTORY    Past Surgical History:   Procedure Laterality Date    ABDOMEN SURGERY      BACK SURGERY      CARDIAC SURGERY      COLONOSCOPY      EYE SURGERY      VASCULAR SURGERY         FAMILY HISTORY    Family History   Problem Relation Age of Onset    Clotting Disorder Mother     Early Death Father     Diabetes Father     Coronary Art Dis Father     Arrhythmia Father     Alcohol Abuse Maternal Uncle     Diabetes Paternal Aunt        SOCIAL HISTORY    Social History     Tobacco Use    Smoking status: Never    Smokeless tobacco: Never    Tobacco comments:     Formally tried TCH Caramel once years ago   Vaping Use    Vaping Use: Never used   Substance Use Topics    Alcohol use: Yes     Comment: Occassional    Drug use: Never       ALLERGIES    Allergies   Allergen Reactions    Bee Venom Swelling    Tylenol With Codeine #3 [Acetaminophen-Codeine] Hives and Swelling       MEDICATIONS    Current Outpatient Medications on File Prior to Encounter   Medication Sig Dispense Refill    empagliflozin (JARDIANCE) 10 MG tablet Take 1 tablet

## 2024-02-07 NOTE — PATIENT INSTRUCTIONS
PHYSICIAN ORDERS AND DISCHARGE INSTRUCTIONS    Wound cleansing:               Do not scrub or use excessive force.              Wash hands with soap and water before and after dressing changes.  Prior to applying a clean dressing, cleanse wound with normal saline, wound cleanser, or mild soap and water.              Ask the physician or nurse before getting the wound(s) wet in a shower.     Wound Care Notes:   Applied for Kerecis grafts   Donated Kerecis MariGen only, applied to Right Plantar Foot 12/28/23  Kerecis MariGen #1 applied to right plantar foot 10/30/23  Kerecis MariGen #2 applied to right plantar foot 11/6/23  Kerecis Mari[employer] #3 applied to right plantar foot 11/13/23  Kerecis MariGen #4 applied to right plantar foot 11/20/23  Kerecis MariGen #5 applied to right plantar foot 11/27/23 Kerecis MariGen #6 applied to right plantar foot 12/5/23  Kerecis MariGen #7 applied to right plantar foot 12/21/23                             Orders for this week: 02/07/24      [facility] COMPLET[facility]: Fax # [phone]    Planning Vascular intervention at [facility]     Right Plantar Foot Wound - Wash with soap and water, pat dry.   Paint with betadine  Apply Sorbact to wound bed.  Cover with two medium sorbex   Wrap with conform and ACE to ankle (Enclose toes).  Leave in place for 1 week.     Right 2nd toe -   Paint with betadine daily and PRN  Keep clean and dry!       Follow up with Dr Li Wednesday.  Call  for any questions or concerns.

## 2024-02-09 ENCOUNTER — HOSPITAL ENCOUNTER (OUTPATIENT)
Dept: WOUND CARE | Age: 64
Discharge: HOME | End: 2024-02-09
Attending: NURSE PRACTITIONER
Payer: OTHER GOVERNMENT

## 2024-02-09 VITALS
TEMPERATURE: 97.8 F | DIASTOLIC BLOOD PRESSURE: 71 MMHG | HEART RATE: 90 BPM | RESPIRATION RATE: 17 BRPM | SYSTOLIC BLOOD PRESSURE: 134 MMHG

## 2024-02-09 DIAGNOSIS — M86.9 DIABETIC FOOT ULCER WITH OSTEOMYELITIS (HCC): Primary | ICD-10-CM

## 2024-02-09 DIAGNOSIS — E11.69 DIABETIC FOOT ULCER WITH OSTEOMYELITIS (HCC): Primary | ICD-10-CM

## 2024-02-09 DIAGNOSIS — E11.621 DIABETIC ULCER OF RIGHT MIDFOOT ASSOCIATED WITH TYPE 2 DIABETES MELLITUS, WITH NECROSIS OF MUSCLE (HCC): ICD-10-CM

## 2024-02-09 DIAGNOSIS — I73.9 PAD (PERIPHERAL ARTERY DISEASE) (HCC): ICD-10-CM

## 2024-02-09 DIAGNOSIS — E11.9 DIABETES MELLITUS TREATED WITH INSULIN (HCC): ICD-10-CM

## 2024-02-09 DIAGNOSIS — E11.621 DIABETIC FOOT ULCER WITH OSTEOMYELITIS (HCC): Primary | ICD-10-CM

## 2024-02-09 DIAGNOSIS — L97.509 DIABETIC FOOT ULCER WITH OSTEOMYELITIS (HCC): Primary | ICD-10-CM

## 2024-02-09 DIAGNOSIS — L97.413 DIABETIC ULCER OF RIGHT MIDFOOT ASSOCIATED WITH TYPE 2 DIABETES MELLITUS, WITH NECROSIS OF MUSCLE (HCC): ICD-10-CM

## 2024-02-09 DIAGNOSIS — Z79.4 DIABETES MELLITUS TREATED WITH INSULIN (HCC): ICD-10-CM

## 2024-02-09 PROCEDURE — 99213 OFFICE O/P EST LOW 20 MIN: CPT

## 2024-02-09 ASSESSMENT — PAIN DESCRIPTION - ONSET: ONSET: ON-GOING

## 2024-02-09 ASSESSMENT — PAIN SCALES - GENERAL: PAINLEVEL_OUTOF10: 9

## 2024-02-09 ASSESSMENT — PAIN - FUNCTIONAL ASSESSMENT: PAIN_FUNCTIONAL_ASSESSMENT: PREVENTS OR INTERFERES SOME ACTIVE ACTIVITIES AND ADLS

## 2024-02-09 ASSESSMENT — PAIN DESCRIPTION - DESCRIPTORS: DESCRIPTORS: DISCOMFORT;THROBBING

## 2024-02-09 ASSESSMENT — PAIN DESCRIPTION - LOCATION: LOCATION: FOOT

## 2024-02-09 ASSESSMENT — PAIN DESCRIPTION - ORIENTATION: ORIENTATION: RIGHT

## 2024-02-09 ASSESSMENT — PAIN DESCRIPTION - DIRECTION: RADIATING_TOWARDS: UP LEG

## 2024-02-09 ASSESSMENT — PAIN DESCRIPTION - PAIN TYPE: TYPE: CHRONIC PAIN

## 2024-02-09 ASSESSMENT — PAIN DESCRIPTION - FREQUENCY: FREQUENCY: CONTINUOUS

## 2024-02-12 ENCOUNTER — TELEPHONE (OUTPATIENT)
Dept: WOUND CARE | Age: 64
End: 2024-02-12

## 2024-02-12 NOTE — TELEPHONE ENCOUNTER
Patient called asking to change his Doctors's appointment with Dr. Li scheduled Wednesday @ 9:00 to a Nurse Visit for a dressing change or can he be seen tomorrow for a Nurse Visit to get his dressing changed ? Stated \"Dr. Li isn't doing anything for me and I'm going to [facility] for a Consult on Friday and [facility].\" SN checked with Provider ROCIO Yeboah who agreed patient can have a nurse visit this week for a dressing change tomorrow or Wednesday. SN checked with [facility]ist. , there are no available appointments for Tuesday ;SN informed patient , who said, \"I'll be there Wednesday\", then  asked \"is there anyway I can get it changed today , this thing is seeping and smells , if not, I'm just going to [facility] to [facility] tomorrow \"; SN checked Adm.Assist who agreed patient could come for 3:00 appointment today due to a cancellation , patient stated \" it's 2:21 now , I can't get there that fast\"; informed patient he can have the dressing change @ the appointment he has scheduled at 0900 on Wednesday , Pt. Stated \" I'll [facility] to the [facility] tomorrow & I'm going to [facility] Friday, just cancel that appointment on Wednesday and take me out of the [facility]\".

## 2024-02-28 ENCOUNTER — TELEPHONE (OUTPATIENT)
Dept: WOUND CARE | Age: 64
End: 2024-02-28

## 2024-08-23 ENCOUNTER — HOSPITAL ENCOUNTER (OUTPATIENT)
Dept: WOUND CARE | Age: 64
Discharge: HOME | End: 2024-08-23
Attending: NURSE PRACTITIONER

## 2024-08-23 NOTE — PATIENT INSTRUCTIONS
PHYSICIAN ORDERS AND DISCHARGE INSTRUCTIONS     Wound cleansing:               Do not scrub or use excessive force.              Wash hands with soap and water before and after dressing changes.  Prior to applying a clean dressing, cleanse wound with normal saline, wound cleanser, or mild soap and water.              Ask the physician or nurse before getting the wound(s) wet in a shower.     Wound Care Notes:   Applied for Kerecis grafts   Donated Kerecis MariGen only, applied to Right Plantar Foot 12/28/23  Kerecis MariGen #1 applied to right plantar foot 10/30/23  Kerecis MariGen #2 applied to right plantar foot 11/6/23  Kerecis MariGen #3 applied to right plantar foot 11/13/23  Kerecis MariGen #4 applied to right plantar foot 11/20/23  Kerecis MariGen #5 applied to right plantar foot 11/27/23 Kerecis MariGen #6 applied to right plantar foot 12/5/23  Kerecis MariGen #7 applied to right plantar foot 12/21/23                              Orders for this week: 02/07/24      [facility] COMPLET[facility]: Fax # [phone]     Planning Vascular intervention at [facility]      Right Plantar Foot Wound - Wash with soap and water, pat dry.   Paint with betadine  Apply Sorbact to wound bed.  Cover with two medium sorbex   Wrap with conform and ACE to ankle (Enclose toes).  Leave in place for 1 week.      Right 2nd toe -   Paint with betadine daily and PRN  Keep clean and dry!         Follow up with ROBIN Ontiveros   Call  for any questions or concerns.

## 2024-09-11 ENCOUNTER — HOSPITAL ENCOUNTER (OUTPATIENT)
Dept: WOUND CARE | Age: 64
Discharge: HOME | End: 2024-09-11
Attending: NURSE PRACTITIONER
Payer: OTHER GOVERNMENT

## 2024-09-11 ENCOUNTER — HOSPITAL ENCOUNTER (OUTPATIENT)
Dept: HYPERBARIC MEDICINE | Age: 64
Discharge: HOME | End: 2024-09-11
Payer: OTHER GOVERNMENT

## 2024-09-11 VITALS
SYSTOLIC BLOOD PRESSURE: 139 MMHG | RESPIRATION RATE: 16 BRPM | DIASTOLIC BLOOD PRESSURE: 78 MMHG | TEMPERATURE: 97 F | HEART RATE: 83 BPM

## 2024-09-11 VITALS
HEART RATE: 79 BPM | DIASTOLIC BLOOD PRESSURE: 74 MMHG | TEMPERATURE: 97 F | RESPIRATION RATE: 16 BRPM | SYSTOLIC BLOOD PRESSURE: 159 MMHG

## 2024-09-11 DIAGNOSIS — M86.9 DIABETIC FOOT ULCER WITH OSTEOMYELITIS (HCC): Primary | ICD-10-CM

## 2024-09-11 DIAGNOSIS — E11.9 DIABETES MELLITUS TREATED WITH INSULIN (HCC): ICD-10-CM

## 2024-09-11 DIAGNOSIS — I73.9 PAD (PERIPHERAL ARTERY DISEASE) (HCC): ICD-10-CM

## 2024-09-11 DIAGNOSIS — E11.621 DIABETIC FOOT ULCER WITH OSTEOMYELITIS (HCC): Primary | ICD-10-CM

## 2024-09-11 DIAGNOSIS — L97.509 DIABETIC FOOT ULCER WITH OSTEOMYELITIS (HCC): Primary | ICD-10-CM

## 2024-09-11 DIAGNOSIS — E11.69 DIABETIC FOOT ULCER WITH OSTEOMYELITIS (HCC): Primary | ICD-10-CM

## 2024-09-11 DIAGNOSIS — E11.621 DIABETIC ULCER OF TOE OF RIGHT FOOT ASSOCIATED WITH TYPE 2 DIABETES MELLITUS, WITH FAT LAYER EXPOSED (HCC): ICD-10-CM

## 2024-09-11 DIAGNOSIS — L97.512 DIABETIC ULCER OF TOE OF RIGHT FOOT ASSOCIATED WITH TYPE 2 DIABETES MELLITUS, WITH FAT LAYER EXPOSED (HCC): ICD-10-CM

## 2024-09-11 DIAGNOSIS — L97.413 DIABETIC ULCER OF RIGHT MIDFOOT ASSOCIATED WITH TYPE 2 DIABETES MELLITUS, WITH NECROSIS OF MUSCLE (HCC): ICD-10-CM

## 2024-09-11 DIAGNOSIS — E11.621 DIABETIC ULCER OF RIGHT MIDFOOT ASSOCIATED WITH TYPE 2 DIABETES MELLITUS, WITH NECROSIS OF MUSCLE (HCC): ICD-10-CM

## 2024-09-11 DIAGNOSIS — Z79.4 DIABETES MELLITUS TREATED WITH INSULIN (HCC): ICD-10-CM

## 2024-09-11 PROCEDURE — G0277 HBOT, FULL BODY CHAMBER, 30M: HCPCS

## 2024-09-11 PROCEDURE — 99213 OFFICE O/P EST LOW 20 MIN: CPT | Performed by: NURSE PRACTITIONER

## 2024-09-11 PROCEDURE — 99212 OFFICE O/P EST SF 10 MIN: CPT

## 2024-09-12 ENCOUNTER — HOSPITAL ENCOUNTER (OUTPATIENT)
Dept: HYPERBARIC MEDICINE | Age: 64
Discharge: HOME | End: 2024-09-12
Payer: OTHER GOVERNMENT

## 2024-09-12 VITALS
SYSTOLIC BLOOD PRESSURE: 136 MMHG | RESPIRATION RATE: 16 BRPM | TEMPERATURE: 96.7 F | HEART RATE: 86 BPM | DIASTOLIC BLOOD PRESSURE: 69 MMHG

## 2024-09-12 DIAGNOSIS — E11.69 DIABETIC FOOT ULCER WITH OSTEOMYELITIS (HCC): Primary | ICD-10-CM

## 2024-09-12 DIAGNOSIS — M86.9 DIABETIC FOOT ULCER WITH OSTEOMYELITIS (HCC): Primary | ICD-10-CM

## 2024-09-12 DIAGNOSIS — L97.509 DIABETIC FOOT ULCER WITH OSTEOMYELITIS (HCC): Primary | ICD-10-CM

## 2024-09-12 DIAGNOSIS — E11.621 DIABETIC FOOT ULCER WITH OSTEOMYELITIS (HCC): Primary | ICD-10-CM

## 2024-09-12 PROCEDURE — G0277 HBOT, FULL BODY CHAMBER, 30M: HCPCS

## 2024-09-12 ASSESSMENT — PAIN SCALES - GENERAL
PAINLEVEL_OUTOF10: 8
PAINLEVEL_OUTOF10: 8

## 2024-09-12 ASSESSMENT — PAIN DESCRIPTION - ORIENTATION: ORIENTATION: RIGHT

## 2024-09-12 ASSESSMENT — PAIN DESCRIPTION - ONSET
ONSET: ON-GOING
ONSET: ON-GOING

## 2024-09-12 ASSESSMENT — PAIN DESCRIPTION - LOCATION
LOCATION: OTHER (COMMENT)
LOCATION: OTHER (COMMENT)

## 2024-09-12 ASSESSMENT — PAIN DESCRIPTION - PAIN TYPE
TYPE: CHRONIC PAIN
TYPE: CHRONIC PAIN

## 2024-09-12 ASSESSMENT — PAIN DESCRIPTION - FREQUENCY
FREQUENCY: INTERMITTENT
FREQUENCY: INTERMITTENT

## 2024-09-12 ASSESSMENT — PAIN DESCRIPTION - DESCRIPTORS
DESCRIPTORS: BURNING;OTHER (COMMENT)
DESCRIPTORS: BURNING;OTHER (COMMENT)

## 2024-09-16 ENCOUNTER — HOSPITAL ENCOUNTER (OUTPATIENT)
Dept: HYPERBARIC MEDICINE | Age: 64
Discharge: HOME | End: 2024-09-16
Payer: OTHER GOVERNMENT

## 2024-09-16 VITALS
RESPIRATION RATE: 16 BRPM | SYSTOLIC BLOOD PRESSURE: 171 MMHG | HEART RATE: 78 BPM | DIASTOLIC BLOOD PRESSURE: 81 MMHG | TEMPERATURE: 97.2 F

## 2024-09-16 DIAGNOSIS — M86.9 DIABETIC FOOT ULCER WITH OSTEOMYELITIS (HCC): Primary | ICD-10-CM

## 2024-09-16 DIAGNOSIS — L97.509 DIABETIC FOOT ULCER WITH OSTEOMYELITIS (HCC): Primary | ICD-10-CM

## 2024-09-16 DIAGNOSIS — E11.69 DIABETIC FOOT ULCER WITH OSTEOMYELITIS (HCC): Primary | ICD-10-CM

## 2024-09-16 DIAGNOSIS — E11.621 DIABETIC FOOT ULCER WITH OSTEOMYELITIS (HCC): Primary | ICD-10-CM

## 2024-09-16 PROCEDURE — G0277 HBOT, FULL BODY CHAMBER, 30M: HCPCS

## 2024-09-16 PROCEDURE — 99183 HYPERBARIC OXYGEN THERAPY: CPT | Performed by: NURSE PRACTITIONER

## 2024-09-16 ASSESSMENT — PAIN SCALES - GENERAL: PAINLEVEL_OUTOF10: 8

## 2024-09-16 ASSESSMENT — PAIN DESCRIPTION - PAIN TYPE: TYPE: CHRONIC PAIN

## 2024-09-16 ASSESSMENT — PAIN - FUNCTIONAL ASSESSMENT: PAIN_FUNCTIONAL_ASSESSMENT: PREVENTS OR INTERFERES SOME ACTIVE ACTIVITIES AND ADLS

## 2024-09-16 ASSESSMENT — PAIN DESCRIPTION - FREQUENCY: FREQUENCY: INTERMITTENT

## 2024-09-16 ASSESSMENT — PAIN DESCRIPTION - LOCATION: LOCATION: OTHER (COMMENT)

## 2024-09-16 ASSESSMENT — PAIN DESCRIPTION - ONSET: ONSET: ON-GOING

## 2024-09-16 ASSESSMENT — PAIN DESCRIPTION - DESCRIPTORS: DESCRIPTORS: ACHING

## 2024-09-18 ENCOUNTER — HOSPITAL ENCOUNTER (OUTPATIENT)
Dept: HYPERBARIC MEDICINE | Age: 64
Discharge: HOME | End: 2024-09-18

## 2024-09-18 VITALS
HEART RATE: 74 BPM | DIASTOLIC BLOOD PRESSURE: 92 MMHG | SYSTOLIC BLOOD PRESSURE: 174 MMHG | RESPIRATION RATE: 16 BRPM | TEMPERATURE: 97.4 F

## 2024-09-18 DIAGNOSIS — E11.69 DIABETIC FOOT ULCER WITH OSTEOMYELITIS (HCC): Primary | ICD-10-CM

## 2024-09-18 DIAGNOSIS — E11.621 DIABETIC FOOT ULCER WITH OSTEOMYELITIS (HCC): Primary | ICD-10-CM

## 2024-09-18 DIAGNOSIS — M86.9 DIABETIC FOOT ULCER WITH OSTEOMYELITIS (HCC): Primary | ICD-10-CM

## 2024-09-18 DIAGNOSIS — L97.509 DIABETIC FOOT ULCER WITH OSTEOMYELITIS (HCC): Primary | ICD-10-CM

## 2024-09-18 ASSESSMENT — PAIN DESCRIPTION - FREQUENCY: FREQUENCY: INTERMITTENT

## 2024-09-18 ASSESSMENT — PAIN DESCRIPTION - PAIN TYPE: TYPE: ACUTE PAIN

## 2024-09-18 ASSESSMENT — PAIN - FUNCTIONAL ASSESSMENT: PAIN_FUNCTIONAL_ASSESSMENT: PREVENTS OR INTERFERES SOME ACTIVE ACTIVITIES AND ADLS

## 2024-09-18 ASSESSMENT — PAIN DESCRIPTION - DESCRIPTORS: DESCRIPTORS: SORE

## 2024-09-18 ASSESSMENT — PAIN DESCRIPTION - ONSET: ONSET: ON-GOING

## 2024-09-18 ASSESSMENT — PAIN DESCRIPTION - LOCATION: LOCATION: BACK;BUTTOCKS

## 2024-09-18 ASSESSMENT — PAIN SCALES - GENERAL: PAINLEVEL_OUTOF10: 9

## 2024-09-18 ASSESSMENT — PAIN DESCRIPTION - ORIENTATION: ORIENTATION: LOWER

## 2024-09-19 ENCOUNTER — HOSPITAL ENCOUNTER (OUTPATIENT)
Dept: HYPERBARIC MEDICINE | Age: 64
Discharge: HOME | End: 2024-09-19
Payer: OTHER GOVERNMENT

## 2024-09-19 VITALS
DIASTOLIC BLOOD PRESSURE: 83 MMHG | HEART RATE: 86 BPM | SYSTOLIC BLOOD PRESSURE: 153 MMHG | RESPIRATION RATE: 16 BRPM | TEMPERATURE: 97.3 F

## 2024-09-19 DIAGNOSIS — E11.69 DIABETIC FOOT ULCER WITH OSTEOMYELITIS (HCC): Primary | ICD-10-CM

## 2024-09-19 DIAGNOSIS — E11.621 DIABETIC FOOT ULCER WITH OSTEOMYELITIS (HCC): Primary | ICD-10-CM

## 2024-09-19 DIAGNOSIS — M86.9 DIABETIC FOOT ULCER WITH OSTEOMYELITIS (HCC): Primary | ICD-10-CM

## 2024-09-19 DIAGNOSIS — L97.509 DIABETIC FOOT ULCER WITH OSTEOMYELITIS (HCC): Primary | ICD-10-CM

## 2024-09-19 PROCEDURE — 99183 HYPERBARIC OXYGEN THERAPY: CPT | Performed by: NURSE PRACTITIONER

## 2024-09-19 PROCEDURE — G0277 HBOT, FULL BODY CHAMBER, 30M: HCPCS

## 2024-09-19 ASSESSMENT — PAIN DESCRIPTION - PAIN TYPE
TYPE: CHRONIC PAIN
TYPE: CHRONIC PAIN

## 2024-09-19 ASSESSMENT — PAIN DESCRIPTION - ONSET
ONSET: ON-GOING
ONSET: ON-GOING

## 2024-09-19 ASSESSMENT — PAIN - FUNCTIONAL ASSESSMENT: PAIN_FUNCTIONAL_ASSESSMENT: PREVENTS OR INTERFERES SOME ACTIVE ACTIVITIES AND ADLS

## 2024-09-19 ASSESSMENT — PAIN DESCRIPTION - LOCATION
LOCATION: BACK;OTHER (COMMENT)
LOCATION: BACK

## 2024-09-19 ASSESSMENT — PAIN DESCRIPTION - ORIENTATION
ORIENTATION: LOWER
ORIENTATION: LOWER

## 2024-09-19 ASSESSMENT — PAIN SCALES - GENERAL
PAINLEVEL_OUTOF10: 5
PAINLEVEL_OUTOF10: 8

## 2024-09-19 ASSESSMENT — PAIN DESCRIPTION - DESCRIPTORS
DESCRIPTORS: ACHING
DESCRIPTORS: ACHING

## 2024-09-19 ASSESSMENT — PAIN DESCRIPTION - FREQUENCY
FREQUENCY: CONTINUOUS
FREQUENCY: CONTINUOUS

## 2024-09-20 ENCOUNTER — HOSPITAL ENCOUNTER (OUTPATIENT)
Dept: HYPERBARIC MEDICINE | Age: 64
Discharge: HOME | End: 2024-09-20

## 2024-09-23 ENCOUNTER — HOSPITAL ENCOUNTER (OUTPATIENT)
Dept: HYPERBARIC MEDICINE | Age: 64
Discharge: HOME | End: 2024-09-23
Payer: OTHER GOVERNMENT

## 2024-09-23 VITALS
HEART RATE: 84 BPM | TEMPERATURE: 96.8 F | SYSTOLIC BLOOD PRESSURE: 132 MMHG | RESPIRATION RATE: 20 BRPM | DIASTOLIC BLOOD PRESSURE: 78 MMHG

## 2024-09-23 DIAGNOSIS — M86.9 DIABETIC FOOT ULCER WITH OSTEOMYELITIS (HCC): Primary | ICD-10-CM

## 2024-09-23 DIAGNOSIS — E11.621 DIABETIC FOOT ULCER WITH OSTEOMYELITIS (HCC): Primary | ICD-10-CM

## 2024-09-23 DIAGNOSIS — L97.509 DIABETIC FOOT ULCER WITH OSTEOMYELITIS (HCC): Primary | ICD-10-CM

## 2024-09-23 DIAGNOSIS — E11.69 DIABETIC FOOT ULCER WITH OSTEOMYELITIS (HCC): Primary | ICD-10-CM

## 2024-09-23 PROCEDURE — 99183 HYPERBARIC OXYGEN THERAPY: CPT | Performed by: NURSE PRACTITIONER

## 2024-09-23 PROCEDURE — G0277 HBOT, FULL BODY CHAMBER, 30M: HCPCS

## 2024-09-23 ASSESSMENT — PAIN DESCRIPTION - LOCATION: LOCATION: HAND

## 2024-09-23 ASSESSMENT — PAIN - FUNCTIONAL ASSESSMENT: PAIN_FUNCTIONAL_ASSESSMENT: PREVENTS OR INTERFERES SOME ACTIVE ACTIVITIES AND ADLS

## 2024-09-23 ASSESSMENT — PAIN SCALES - GENERAL: PAINLEVEL_OUTOF10: 10

## 2024-09-23 ASSESSMENT — PAIN DESCRIPTION - DESCRIPTORS: DESCRIPTORS: OTHER (COMMENT)

## 2024-09-23 ASSESSMENT — PAIN DESCRIPTION - FREQUENCY: FREQUENCY: INTERMITTENT

## 2024-09-23 ASSESSMENT — PAIN DESCRIPTION - ORIENTATION: ORIENTATION: RIGHT;LEFT

## 2024-09-23 ASSESSMENT — PAIN DESCRIPTION - PAIN TYPE: TYPE: ACUTE PAIN

## 2024-09-23 ASSESSMENT — PAIN DESCRIPTION - ONSET: ONSET: ON-GOING

## 2024-09-26 ENCOUNTER — HOSPITAL ENCOUNTER (OUTPATIENT)
Dept: HYPERBARIC MEDICINE | Age: 64
Discharge: HOME | End: 2024-09-26
Payer: OTHER GOVERNMENT

## 2024-09-26 VITALS
DIASTOLIC BLOOD PRESSURE: 81 MMHG | SYSTOLIC BLOOD PRESSURE: 152 MMHG | TEMPERATURE: 97.3 F | HEART RATE: 82 BPM | RESPIRATION RATE: 16 BRPM

## 2024-09-26 DIAGNOSIS — M86.9 DIABETIC FOOT ULCER WITH OSTEOMYELITIS (HCC): Primary | ICD-10-CM

## 2024-09-26 DIAGNOSIS — E11.69 DIABETIC FOOT ULCER WITH OSTEOMYELITIS (HCC): Primary | ICD-10-CM

## 2024-09-26 DIAGNOSIS — L97.509 DIABETIC FOOT ULCER WITH OSTEOMYELITIS (HCC): Primary | ICD-10-CM

## 2024-09-26 DIAGNOSIS — E11.621 DIABETIC FOOT ULCER WITH OSTEOMYELITIS (HCC): Primary | ICD-10-CM

## 2024-09-26 PROCEDURE — G0277 HBOT, FULL BODY CHAMBER, 30M: HCPCS

## 2024-09-26 ASSESSMENT — PAIN DESCRIPTION - ONSET
ONSET: ON-GOING
ONSET: ON-GOING

## 2024-09-26 ASSESSMENT — PAIN DESCRIPTION - DESCRIPTORS
DESCRIPTORS: OTHER (COMMENT)
DESCRIPTORS: OTHER (COMMENT)

## 2024-09-26 ASSESSMENT — PAIN DESCRIPTION - PAIN TYPE
TYPE: CHRONIC PAIN
TYPE: CHRONIC PAIN

## 2024-09-26 ASSESSMENT — PAIN SCALES - GENERAL
PAINLEVEL_OUTOF10: 10
PAINLEVEL_OUTOF10: 10

## 2024-09-26 ASSESSMENT — PAIN - FUNCTIONAL ASSESSMENT: PAIN_FUNCTIONAL_ASSESSMENT: PREVENTS OR INTERFERES SOME ACTIVE ACTIVITIES AND ADLS

## 2024-09-26 ASSESSMENT — PAIN DESCRIPTION - FREQUENCY
FREQUENCY: CONTINUOUS
FREQUENCY: CONTINUOUS

## 2024-09-26 ASSESSMENT — PAIN DESCRIPTION - LOCATION
LOCATION: ARM;HAND
LOCATION: OTHER (COMMENT)

## 2024-09-26 ASSESSMENT — PAIN DESCRIPTION - ORIENTATION: ORIENTATION: RIGHT;LEFT

## 2024-09-30 ENCOUNTER — HOSPITAL ENCOUNTER (OUTPATIENT)
Dept: HYPERBARIC MEDICINE | Age: 64
Discharge: HOME | End: 2024-09-30

## 2024-10-02 ENCOUNTER — HOSPITAL ENCOUNTER (OUTPATIENT)
Dept: HYPERBARIC MEDICINE | Age: 64
Discharge: HOME | End: 2024-10-02
Payer: OTHER GOVERNMENT

## 2024-10-02 VITALS
DIASTOLIC BLOOD PRESSURE: 74 MMHG | HEART RATE: 81 BPM | RESPIRATION RATE: 16 BRPM | TEMPERATURE: 97 F | SYSTOLIC BLOOD PRESSURE: 178 MMHG

## 2024-10-02 DIAGNOSIS — E11.69 DIABETIC FOOT ULCER WITH OSTEOMYELITIS (HCC): Primary | ICD-10-CM

## 2024-10-02 DIAGNOSIS — L97.509 DIABETIC FOOT ULCER WITH OSTEOMYELITIS (HCC): Primary | ICD-10-CM

## 2024-10-02 DIAGNOSIS — E11.621 DIABETIC FOOT ULCER WITH OSTEOMYELITIS (HCC): Primary | ICD-10-CM

## 2024-10-02 DIAGNOSIS — M86.9 DIABETIC FOOT ULCER WITH OSTEOMYELITIS (HCC): Primary | ICD-10-CM

## 2024-10-02 PROCEDURE — G0277 HBOT, FULL BODY CHAMBER, 30M: HCPCS

## 2024-10-02 RX ORDER — CEFPODOXIME PROXETIL 200 MG/1
200 TABLET, FILM COATED ORAL 2 TIMES DAILY
COMMUNITY

## 2024-10-02 ASSESSMENT — PAIN DESCRIPTION - PAIN TYPE
TYPE: CHRONIC PAIN
TYPE: CHRONIC PAIN

## 2024-10-02 ASSESSMENT — PAIN DESCRIPTION - DESCRIPTORS
DESCRIPTORS: OTHER (COMMENT)
DESCRIPTORS: OTHER (COMMENT)

## 2024-10-02 ASSESSMENT — PAIN DESCRIPTION - FREQUENCY
FREQUENCY: CONTINUOUS
FREQUENCY: CONTINUOUS

## 2024-10-02 ASSESSMENT — PAIN SCALES - GENERAL
PAINLEVEL_OUTOF10: 8
PAINLEVEL_OUTOF10: 10

## 2024-10-02 ASSESSMENT — PAIN DESCRIPTION - LOCATION
LOCATION: ARM;HAND;BACK
LOCATION: ARM;HAND;BACK

## 2024-10-02 ASSESSMENT — PAIN DESCRIPTION - ONSET
ONSET: ON-GOING
ONSET: ON-GOING

## 2024-10-02 ASSESSMENT — PAIN DESCRIPTION - ORIENTATION
ORIENTATION: LOWER;MID
ORIENTATION: MID;LOWER

## 2024-10-03 ENCOUNTER — HOSPITAL ENCOUNTER (OUTPATIENT)
Dept: HYPERBARIC MEDICINE | Age: 64
Discharge: HOME | End: 2024-10-03

## 2024-10-03 NOTE — PROGRESS NOTES
[facility]  Hyperbaric Oxygen Therapy   Progress Note      NAME: Devon Morgan   MEDICAL RECORD NUMBER:  [id number]  AGE: 64 y.o.   GENDER: male  : 1960  EPISODE DATE:  10/2/2024     Subjective     HBO Treatment Number: 8 out of Total Treatments: 30    HBO Diagnosis:     Indications: Lower Extremity Diabetic Wound ___(site) (right foot)  Morejon: Morejon 3     Safety checks performed prior to treatment.  See doc flowsheets for documentation.    Objective           No results for input(s): \"POCGLU\" in the last 72 hours.    Pre treatment Vital Signs       Temp: 97.1 °F (36.2 °C)     Pulse: 88     Respirations: 12     BP: (!) 171/93 (Noted to provider)     Blood Sugar N/A    Post treatment Vital Signs  Temp: 97 °F (36.1 °C)  Pulse: 81  Respirations: 16  BP: (!) 178/74  Blood Sugar N/A    Assessment        Physical Exam:  General Appearance:  alert and oriented to person, place and time, well-developed and well-nourished, in no acute distress    Pre Tympanic Membrane Assessment:  tympanic membranes intact bilaterally, normal color    Post Tympanic Membrane Assessment:  Right: Normal (No complaints reported)  Left: Normal (No complaints reported)    Pulmonary/Chest:  clear to auscultation bilaterally- no wheezes, rales or rhonchi, normal air movement, no respiratory distress and no chest wall tenderness    Cardiovascular:  normal, regular rate and rhythm    Chamber #: [id number]       Treatment Start Time: 1319     Pressure Reached Time: 1327  EDOUARD : 2  Number of Air Breaks:  Treatment Status: No Air break      Decompression Time: 1457   Treatment End Time: 1506    Symptoms Noted During Treatment: None    Adverse Event: no      Total time in chamber:  Minutes at depth:     I was present on these premises and immediately available to furnish assistance & direction throughout the procedure.       Plan          Devon Morgan is a 64 y.o. male  did successfully complete today's hyperbaric

## 2024-10-04 ENCOUNTER — HOSPITAL ENCOUNTER (OUTPATIENT)
Dept: HYPERBARIC MEDICINE | Age: 64
Discharge: HOME | End: 2024-10-04
Payer: OTHER GOVERNMENT

## 2024-10-04 VITALS
TEMPERATURE: 97.1 F | DIASTOLIC BLOOD PRESSURE: 83 MMHG | SYSTOLIC BLOOD PRESSURE: 144 MMHG | HEART RATE: 75 BPM | RESPIRATION RATE: 16 BRPM

## 2024-10-04 DIAGNOSIS — L97.509 DIABETIC FOOT ULCER WITH OSTEOMYELITIS (HCC): Primary | ICD-10-CM

## 2024-10-04 DIAGNOSIS — M86.9 DIABETIC FOOT ULCER WITH OSTEOMYELITIS (HCC): Primary | ICD-10-CM

## 2024-10-04 DIAGNOSIS — E11.621 DIABETIC FOOT ULCER WITH OSTEOMYELITIS (HCC): Primary | ICD-10-CM

## 2024-10-04 DIAGNOSIS — E11.69 DIABETIC FOOT ULCER WITH OSTEOMYELITIS (HCC): Primary | ICD-10-CM

## 2024-10-04 PROCEDURE — G0277 HBOT, FULL BODY CHAMBER, 30M: HCPCS

## 2024-10-04 ASSESSMENT — PAIN DESCRIPTION - FREQUENCY
FREQUENCY: CONTINUOUS
FREQUENCY: CONTINUOUS

## 2024-10-04 ASSESSMENT — PAIN DESCRIPTION - DESCRIPTORS
DESCRIPTORS: OTHER (COMMENT)
DESCRIPTORS: BURNING

## 2024-10-04 ASSESSMENT — PAIN DESCRIPTION - LOCATION
LOCATION: HAND;ARM
LOCATION: HAND

## 2024-10-04 ASSESSMENT — PAIN DESCRIPTION - PAIN TYPE
TYPE: CHRONIC PAIN
TYPE: CHRONIC PAIN

## 2024-10-04 ASSESSMENT — PAIN DESCRIPTION - ONSET
ONSET: ON-GOING
ONSET: ON-GOING

## 2024-10-04 ASSESSMENT — PAIN SCALES - GENERAL: PAINLEVEL_OUTOF10: 8

## 2024-10-04 ASSESSMENT — PAIN DESCRIPTION - ORIENTATION
ORIENTATION: RIGHT;LEFT
ORIENTATION: LEFT;RIGHT

## 2024-10-04 NOTE — PROGRESS NOTES
treatment at [facility] [place].    In my clinical judgement, ongoing HBO therapy is  necessary at this time, given a threat to patient function, limb or life from the current condition.      Supervision and attendance of Hyperbaric Oxygen Therapy provided.  Continue HBO treatment as outlined in the treatment plan.    Hyperbaric Oxygen: Devon Morgan tolerated Treatment Number: 9 well today without complications.     Electronically signed by ROBIN Pickett CNP on 10/4/2024 at 3:34 PM

## 2024-10-16 ENCOUNTER — HOSPITAL ENCOUNTER (OUTPATIENT)
Dept: HYPERBARIC MEDICINE | Age: 64
Discharge: HOME | End: 2024-10-16

## 2024-10-16 NOTE — PROGRESS NOTES
Patient called on 10/10/24 @ 2001 , left message on voicemail stated \" I'm cancelling the rest of the treatments , my hands are burning so bad , I can't hold on to the steering wheel ; I'm trying to get clothes on & can't manipulate my hands ; until I have surgery on my hands to fix this, I'm done \". SN reported to ROCIO Yeboah on 10/15/24

## 2024-10-21 ENCOUNTER — HOSPITAL ENCOUNTER (OUTPATIENT)
Dept: HYPERBARIC MEDICINE | Age: 64
Discharge: HOME | End: 2024-10-21

## 2024-10-21 NOTE — PROGRESS NOTES
Patient called @ 1314 reports \" I had to go to [place] to get my car and my hands are already burning bad, I can hardly drive ; I'm in [place] now and I can't finish the drive to get there . There is No Way I can drive home when I come out of the chamber , I'm done with Hyperbarics; I know the doctors want me to finish it  , I don't know why I'm kidding myself and keep letting them talk me into this; I can't do it , I'm done.\" This SN requested patient to [facility], that [facility] treatment, patient reports , I'll deal with them tomorrow . Explained to patient the referring physicians need to be in the loop and be aware he is not going to continue HBO. Pt. Agreed to inform [facility] doctors.This SN informed ROCIO Yeboah.  This SN called spoke with [facility] in [facility]ious Disease at [place]; reported the above; will make note in chart to Dr. Kemp and care team members.